# Patient Record
Sex: FEMALE
[De-identification: names, ages, dates, MRNs, and addresses within clinical notes are randomized per-mention and may not be internally consistent; named-entity substitution may affect disease eponyms.]

---

## 2018-02-24 ENCOUNTER — HOSPITAL ENCOUNTER (EMERGENCY)
Dept: HOSPITAL 46 - ED | Age: 73
Discharge: HOME | End: 2018-02-24
Payer: MEDICARE

## 2018-02-24 VITALS — WEIGHT: 170 LBS | BODY MASS INDEX: 31.28 KG/M2 | HEIGHT: 62 IN

## 2018-02-24 DIAGNOSIS — E03.9: ICD-10-CM

## 2018-02-24 DIAGNOSIS — M16.11: Primary | ICD-10-CM

## 2018-02-24 DIAGNOSIS — F32.9: ICD-10-CM

## 2018-02-24 DIAGNOSIS — Z79.899: ICD-10-CM

## 2018-02-24 DIAGNOSIS — M51.36: ICD-10-CM

## 2018-02-24 DIAGNOSIS — Z79.82: ICD-10-CM

## 2018-02-24 DIAGNOSIS — Z88.5: ICD-10-CM

## 2018-02-24 DIAGNOSIS — E78.00: ICD-10-CM

## 2018-02-24 DIAGNOSIS — M19.011: ICD-10-CM

## 2020-07-23 NOTE — XMS
Encounter Summary
  Created on: 2020
 
 Parrish Weeksgabe Montero
 External Reference #: [id number]
 : [birth year]
 Sex: Female
 
 Demographics
 
 
+-----------------------+----------------------+
| Address               | [address]         |
|                       | [place]  [address] |
+-----------------------+----------------------+
| Home Phone            | [phone]      |
+-----------------------+----------------------+
| Preferred Language    | Unknown              |
+-----------------------+----------------------+
| Marital Status        |               |
+-----------------------+----------------------+
| Advent Affiliation | Unknown              |
+-----------------------+----------------------+
| Race                  | Unknown              |
+-----------------------+----------------------+
| Ethnic Group          | Unknown              |
+-----------------------+----------------------+
 
 
 Author
 
 
+--------------+--------------------------------------------+
| Author       | Located within [facility]  |
|              | and [facility]                                |
+--------------+--------------------------------------------+
| Organization | Located within [facility]  |
|              | and [facility]                                |
+--------------+--------------------------------------------+
| Address      | Unknown                                    |
+--------------+--------------------------------------------+
| Phone        | Unavailable                                |
+--------------+--------------------------------------------+
 
 
 
 Support
 
 
+-------------------+--------------+---------+-----------------+
| Name              | Relationship | Address | Phone           |
+-------------------+--------------+---------+-----------------+
| Melissa Lau | ECON         | Unknown | [phone] |
+-------------------+--------------+---------+-----------------+
| Mike Lau   | ECON         | Unknown | [phone] |
+-------------------+--------------+---------+-----------------+
| Damaris Coyle     | ECON         | Unknown | [phone] |
+-------------------+--------------+---------+-----------------+
 
 
 
 
 Care Team Providers
 
 
+-----------------------+------+-----------------+
| Care Team Member Name | Role | Phone           |
+-----------------------+------+-----------------+
| Chandra King MD | PCP  | [phone] |
+-----------------------+------+-----------------+
 
 
 
 Reason for Referral
 Diagnostic/Screening (Routine)
 
+--------+--------+-----------+--------------+--------------+---------------+
| Status | Reason | Specialty | Diagnoses /  | Referred By  | Referred To   |
|        |        |           | Procedures   | Contact      | Contact       |
+--------+--------+-----------+--------------+--------------+---------------+
| Closed |        | Radiology |   Diagnoses  |   S[place],  |   [facility]    |
|        |        |           |  Dilated     | Anastasia CASTILLO MD   | [address]  |
|        |        |           | aortic root  | [address] |  [place], |
|        |        |           | (HCC)  Heart |  [place]  [place]   |  [place]           |
|        |        |           |  [place]      | [place]    | [address]    |
|        |        |           | Procedures   | [address]        | Phone:        |
|        |        |           | ECHO         | Phone:       | [phone]  |
|        |        |           | Complete  [place] | [phone] |  Fax:         |
|        |        |           |  ECHO HEART  |   Fax:       | [phone]  |
|        |        |           | XTHORACIC,CO | [phone] |               |
|        |        |           | MPLETE W     |              |               |
|        |        |           | DOPPLER  [place]  |              |               |
|        |        |           | ECHO HEART   |              |               |
|        |        |           | XTHORACIC,CO |              |               |
|        |        |           | MPLETE, W/O  |              |               |
|        |        |           | DOPPLER      |              |               |
+--------+--------+-----------+--------------+--------------+---------------+
 
 
 
 
 Reason for Visit
 Diagnostic/Screening (Routine)
 
+--------+--------+-----------+--------------+--------------+---------------+
| Status | Reason | Specialty | Diagnoses /  | Referred By  | Referred To   |
|        |        |           | Procedures   | Contact      | Contact       |
+--------+--------+-----------+--------------+--------------+---------------+
| Closed |        | Radiology |   Diagnoses  |   Suki,  |   [facility]    |
|        |        |           |  Dilated     | Anastasia CASTILLO MD   | [address]  |
|        |        |           | aortic root  | [address] |  [place], |
|        |        |           | (HCC)  Heart |  [place]  [place]   |  [place]           |
|        |        |           |  murmur      | [place]    | [address]    |
|        |        |           | Procedures   | [address]        | Phone:        |
|        |        |           | ECHO         | Phone:       | [phone]  |
|        |        |           | Complete  [place] | [phone] |  Fax:         |
|        |        |           |  ECHO HEART  |   Fax:       | [phone]  |
|        |        |           | XTHORACIC,CO | [phone] |               |
|        |        |           | MPLETE W     |              |               |
 
|        |        |           | DOPPLER  [place]  |              |               |
|        |        |           | ECHO HEART   |              |               |
|        |        |           | XTHORACIC,CO |              |               |
|        |        |           | MPLETE, W/O  |              |               |
|        |        |           | DOPPLER      |              |               |
+--------+--------+-----------+--------------+--------------+---------------+
 
 
 
 
 Encounter Details
 
 
+--------+-----------+----------------------+----------------------+----------------------+
| Date   | Type      | Department           | Care Team            | Description          |
+--------+-----------+----------------------+----------------------+----------------------+
| 08/10/ | Hospital  |   [facility] |   Anastasia Cohn,  | Dilated aortic root  |
| 2017   | Encounter |  [facility] ECHO  [address] | MD  [address]  | ([facility]); Heart murmur  |
|        |           |  [address]  [place]       |  [place]     |                      |
|        |           | [place] [address] | [address]  [phone]  |                      |
|        |           |   [phone]       |  [phone] (Fax)  |                      |
|        |           |                      |  Sendy Campos |                      |
|        |           |                      |  ANNA, Technologist     |                      |
|        |           |                      | [place], [place]      |                      |
|        |           |                      | [address]                |                      |
+--------+-----------+----------------------+----------------------+----------------------+
 
 
 
 Social History
 
 
+---------------+-------+-----------+--------+---------------------+
| Tobacco Use   | Types | Packs/Day | Years  | Date                |
|               |       |           | Used   |                     |
+---------------+-------+-----------+--------+---------------------+
| Former Smoker |       |           |        | Started: 1992 |
+---------------+-------+-----------+--------+---------------------+
 
 
 
+-------------+-------------+---------+----------+
| Alcohol Use | Drinks/Week | oz/Week | Comments |
+-------------+-------------+---------+----------+
| No          |             |         |          |
+-------------+-------------+---------+----------+
 
 
 
+------------------+---------------+
| Sex Assigned at  | Date Recorded |
| Birth            |               |
+------------------+---------------+
| Not on file      |               |
+------------------+---------------+
 documented as of this encounter
 
 Medications at Time of Discharge
 
 
 
+----------------------+----------------------+-----------+---------+----------+----------+
| Medication           | Sig                  | Dispensed | Refills | Start    | End Date |
|                      |                      |           |         | Date     |          |
+----------------------+----------------------+-----------+---------+----------+----------+
|   amLODIPine         | Take 5 mg by mouth   |           | 0       |          |          |
| (NORVASC) 5 mg       | Daily.               |           |         |          |          |
| tablet               |                      |           |         |          |          |
+----------------------+----------------------+-----------+---------+----------+----------+
|   aspirin 81 mg EC   | Take 81 mg by mouth  |           | 0       |          |          |
| tablet               | Daily.               |           |         |          |          |
+----------------------+----------------------+-----------+---------+----------+----------+
|   cetirizine         | Take 1 tablet by     |   30      | 2       | 20 |          |
| (ZYRTEC) 10 mg       | mouth Daily. As      | tablet    |         | 17       |          |
| tablet               | needed for rash      |           |         |          |          |
+----------------------+----------------------+-----------+---------+----------+----------+
|   cholecalciferol    | Take 1,000 Units by  |           | 0       |          |          |
| (VITAMIN D-3) 1000   | mouth Daily.         |           |         |          |          |
| UNITS TABS           |                      |           |         |          |          |
+----------------------+----------------------+-----------+---------+----------+----------+
|   FLUoxetine         | Take 20 mg by mouth  |           | 0       |          |          |
| (PROZAC) 20 mg       | Daily.               |           |         |          |          |
| capsule              |                      |           |         |          |          |
+----------------------+----------------------+-----------+---------+----------+----------+
|   levothyroxine      | Take 100 mcg by      |           | 0       |          |          |
| (SYNTHROID,          | mouth every morning  |           |         |          |          |
| LEVOTHROID) 100 mcg  | (before breakfast).  |           |         |          |          |
| tablet               |                      |           |         |          |          |
+----------------------+----------------------+-----------+---------+----------+----------+
|   Multiple           | Take 1 tablet by     |           | 0       |          |          |
| Vitamins-Minerals    | mouth Daily.         |           |         |          |          |
| (ADULT MULTIVITAMIN  |                      |           |         |          |          |
| WITH MINERALS/IRON)  |                      |           |         |          |          |
| TABS                 |                      |           |         |          |          |
+----------------------+----------------------+-----------+---------+----------+----------+
|   nitroglycerin      | Place 1 tablet under |   25      | 1       | 20 |          |
| (NITROSTAT) 0.4 mg   |  the tongue every 5  | tablet    |         | 17       |          |
| SL tablet            | minutes as needed    |           |         |          |          |
|                      | for Chest pain.      |           |         |          |          |
+----------------------+----------------------+-----------+---------+----------+----------+
|   simvastatin        | Take 40 mg by mouth  |           | 0       |          |          |
| (ZOCOR) 40 mg tablet | nightly.             |           |         |          |          |
+----------------------+----------------------+-----------+---------+----------+----------+
|   tolterodine        | Take 4 mg by mouth   |           | 0       |          |          |
| (DETROL LA) 4 MG 24  | Daily.               |           |         |          |          |
| hr capsule           |                      |           |         |          |          |
+----------------------+----------------------+-----------+---------+----------+----------+
|   traMADol (ULTRAM)  | Take 50 mg by mouth  |           | 0       |          |          |
| 50 mg tablet         | every 6 hours as     |           |         |          |          |
|                      | needed for Pain.     |           |         |          |          |
+----------------------+----------------------+-----------+---------+----------+----------+
|   traZODone          | Take 50 mg by mouth  |           | 0       |          |          |
| (DESYREL) 50 mg      | nightly.             |           |         |          |          |
| tablet               |                      |           |         |          |          |
+----------------------+----------------------+-----------+---------+----------+----------+
|   triamcinolone      | Small amount to rash |   45 g    | 1       | 20 |          |
| (KENALOG) 0.1% cream |  twice a day         |           |         | 17       |          |
+----------------------+----------------------+-----------+---------+----------+----------+
 documented as of this encounter
 
 Plan of Treatment
 
 Not on filedocumented as of this encounter
 
 Procedures
 
 
+----------------+--------+-------------+----------------------+----------------------+
| Procedure Name | Priori | Date/Time   | Associated Diagnosis | Comments             |
|                | ty     |             |                      |                      |
+----------------+--------+-------------+----------------------+----------------------+
| ECHO COMPLETE  | Routin | 08/10/2017  |   Dilated aortic     |   Results for this   |
|                | e      | 10:42 AM    | root (HCC)  Heart    | procedure are in the |
|                |        | PDT         | murmur               |  results section.    |
+----------------+--------+-------------+----------------------+----------------------+
 documented in this encounter
 
 Results
 ECHO Complete (08/10/2017 10:42 AM PDT)
 
+-------------+-------+-----------+-------------+--------------+
| Component   | Value | Ref Range | Performed   | Pathologist  |
|             |       |           | At          | Signature    |
+-------------+-------+-----------+-------------+--------------+
| LVEF-TTE    | [facility]    |           | [facility]  |              |
| [facility] |       |           | [facility]    |              |
| [facility]     |       |           | [facility]     |              |
|             |       |           | [facility] -    |              |
|             |       |           | IMAGING     |              |
+-------------+-------+-----------+-------------+--------------+
 
 
 
+----------+
| Specimen |
+----------+
|          |
+----------+
 
 
 
+----------------------------------------------------------------------------------------+--
---------------+
| Narrative                                                                              | P
erformed At    |
+----------------------------------------------------------------------------------------+--
---------------+
|   This result has an attachment that is not available.  Transthoracic                  |  
 [facility]    |
| Echocardiography Report (TTE)  Demographics  Patient Name    YOU [facility]CD[facility]S        |
| [facility]      Room Number                        GISSEL  Patient Number                     | [facility]  |
| [id number]       Date of Study               08/10/2017  Visit Number                 | -
 IMAGING       |
|     [id number]  Accession         [id number]         Referring                        |  
               |
| Physician      ANASTASIA COHN MD Number  Date of Birth   1945                    |  
               |
|       Sonographer                  [place],                                  |  
               |
|                                                                   [place]                   |  
 
               |
| Age                  71 year(s)         Interpreting                                   |  
               |
| ILANA LOPEZ MD                                                                          |  
               |
|   Cardiologist  Gender             Female               Nurse                          |  
               |
|                                          Stress Technician Procedure                   |  
               |
| Type of Study  TTE procedure: ECHO Complete. Procedure dateDate:                       |  
               |
| 08/10/2017Start: 10:10 AM Technical Quality: Adequate                                  |  
               |
| visualizationStudy Location: Echo Lab Patient Status: RoutineHeight:                   |  
               |
| 63 inchesWeight: 171 poundsBSA: 1.81 m^2BMI: 30.29 kg/m^2Rhythm:                       |  
               |
| Normal Sinus Rhythm ConclusionsSummaryNormal left ventricular cavity                   |  
               |
| with overall normal systolic function.Mild left atrial                                 |  
               |
| enlargement.LVEF is estimated at 60 %.Mild aortic valve                                |  
               |
| regurgitation.Since prior echo of 2017, no significant                             |  
               |
| changesRecommendationNo further cardiac work-up or                                     |  
               |
| therapy.Signature-----------------------------------------------------                 |  
               |
| ----------------------- Electronically signed by ILANA LOPEZ                            |  
               |
| MD(Interpreting physician) on 08/10/2017 05:41                                         |  
               |
| PM--------------------------------------------------------------------                 |  
               |
| -------- FindingsMitral ValveMild thickening of the mitral valve                       |  
               |
| leaflets without significantregurgitation or stenosis.Aortic                           |  
               |
| ValveAortic valve appears trileaflet.Mild aortic regurgitation is                      |  
               |
| noted.Diffuse thickening (sclerosis) of the aortic valve cusps without                 |  
               |
|  reducedexcursion.Tricuspid ValveStructurally normal tricuspid valve                   |  
               |
| without significant stenosis orregurgitation.Pulmonic                                  |  
               |
| ValveStructurally normal pulmonic valve without significant stenosis                   |  
               |
| orregurgitation.Left AtriumThe left atrium is mildly dilated.Left                      |  
               |
| VentricleLeft Ventricular size appears to be normal with an ejection                   |  
               |
| fractionestimated at 60 %.Right AtriumNormal right atrial size.Right                   |  
               |
| VentricleNormal right ventricular size and function.Pericardial                        |  
               |
| EffusionNo evidence of any pericardial effusion.Pleural EffusionNo                     |  
               |
| evident pleural effusion identified.MiscellaneousAortic root dimension                 |  
 
               |
|  within normal limits.No significant change in ascending aortic                        |  
               |
| diameter since prior echo of  Valves  Mitral Valve  Peak                        |  
               |
| E-Wave: 0.83 m/s Peak A-Wave: 0.58 m/s  Tissue Doppler  Septal e'                      |  
               |
| Velocity: 0.09 m/s  Aortic Valve        Area (continuity): 1.21 cm^2                   |  
               |
|       Mean Gradient: 3.32 mmHg  LVOT  Peak Velocity: 1.07 m/s LVOT                     |  
               |
| Diameter: 1.35 cm Structures  Left Atrium  LA A/P Dimension: 4.24 cm                   |  
               |
|                                 LA Area: 19.89 cm^2 LA Vol/BSA Index:                  |  
               |
| 26 mL/m^2                              LA Volume: 47.06 ml                             |  
               |
|                                                             EF                         |  
               |
| Sseklazug94%  Left Ventricle  Diastolic Dimension: 5.37 cm                             |  
               |
|  Systolic Dimension: 3.67 cm Septum Diastolic: 0.9 cm PW Diastolic:                    |  
               |
| 0.9 cm EF Calculated: 55%  Miscellaneous  Aorta  Aortic Root: 3.41 cm                  |  
               |
| Ascending Aorta: 3.58 cm                                                               |  
               |
| 08/10/2017 05:41 PM                                                                    |  
               |
|----------------------------------------------------------------------------             | 
                |
|                                                                                        |  
               |
|Findings                                                                               |   
              |
|Mitral Valve                                                                            |  
               |
|Mild thickening of the mitral valve leaflets without significant                        |  
               |
|regurgitation or stenosis.                                                              |  
               |
|Aortic Valve                                                                            |  
               |
|Aortic valve appears trileaflet.                                                        |  
               |
|Mild aortic regurgitation is noted.                                                     |  
               |
|Diffuse thickening (sclerosis) of the aortic valve cusps without reduced                |  
               |
|excursion.                                                                             |   
              |
|Tricuspid Valve                                                                         |  
               |
|Structurally normal tricuspid valve without significant stenosis or                     |  
               |
|regurgitation.                                                                         |   
              |
|Pulmonic Valve                                                                          |  
               |
|Structurally normal pulmonic valve without significant stenosis or                      |  
 
               |
|regurgitation.                                                                         |   
              |
|Left Atrium                                                                             |  
               |
|The left atrium is mildly dilated.                                                      |  
               |
|Left Ventricle                                                                          |  
               |
|Left Ventricular size appears to be normal with an ejection fraction                    |  
               |
|estimated at 60 %.                                                                      |  
               |
|Right Atrium                                                                            |  
               |
|Normal right atrial size.                                                               |  
               |
|Right Ventricle                                                                         |  
               |
|Normal right ventricular size and function.                                             |  
               |
|Pericardial Effusion                                                                    |  
               |
|No evidence of any pericardial effusion.                                                |  
               |
|Pleural Effusion                                                                        |  
               |
|No evident pleural effusion identified.                                                 |  
               |
|Miscellaneous                                                                          |   
              |
|Aortic root dimension within normal limits.                                             |  
               |
|No significant change in ascending aortic diameter since prior echo of               |  
               |
|                                                                                    |  
               |
|                                                                                        |  
               |
|Valves                                                                                 |   
              |
|                                                                                        |  
               |
| Mitral Valve                                                                           |  
               |
|                                                                                        |  
               |
| Peak E-Wave: 0.83 m/s                                                                  |  
               |
| Peak A-Wave: 0.58 m/s                                                                  |  
               |
|                                                                                        |  
               |
| Tissue Doppler                                                                         |  
               |
|                                                                                        |  
               |
| Septal e' Velocity: 0.09 m/s                                                           |  
               |
|                                                                                        |  
 
               |
| Aortic Valve                                                                           |  
               |
|                                                                                        |  
               |
|       Area (continuity): 1.21 cm^2                                                     |  
               |
|       Mean Gradient: 3.32 mmHg                                                         |  
               |
|                                                                                        |  
               |
| LVOT                                                                                   |  
               |
|                                                                                        |  
               |
| Peak Velocity: 1.07 m/s                                                                |  
               |
| LVOT Diameter: 1.35 cm                                                                 |  
               |
|                                                                                        |  
               |
|Structures                                                                             |   
              |
|                                                                                        |  
               |
| Left Atrium                                                                            |  
               |
|                                                                                        |  
               |
| LA A/P Dimension: 4.24 cm                                 LA Area: 19.89 cm^2          |  
               |
| LA Vol/BSA Index: 26 mL/m^2                              LA Volume: 47.06 ml           |  
               |
|                                                                        EF [id number]% |  
               |
|                                                                                        |  
               |
| Left Ventricle                                                                         |  
               |
|                                                                                        |  
               |
| Diastolic Dimension: 5.37 cm             Systolic Dimension: 3.67 cm                   |  
               |
| Septum Diastolic: 0.9 cm                                                               |  
               |
| PW Diastolic: 0.9 cm                                                                   |  
               |
| EF Calculated: 55%                                                                     |  
               |
|                                                                                        |  
               |
| Miscellaneous                                                                          |  
               |
|                                                                                        |  
               |
| Aorta                                                                                  |  
               |
|                                                                                        |  
               |
| Aortic Root: 3.41 cm                                                                   |  
 
               |
| Ascending Aorta: 3.58 cm                                                               |  
               |
|                                                                                        |  
               |
+----------------------------------------------------------------------------------------+--
---------------+
 
 
 
+------------------------------------------------------------------------------------------+
| Procedure Note                                                                           |
+------------------------------------------------------------------------------------------+
|   Ace, Rad Results In - 2017  5:08 PM PDT  Transthoracic Echocardiography Report   |
| (TTE) Demographics Patient Name   YOU LOERA    Room Number                GISSEL Patient |
|  Number [id number]     Date of Study          08/10/2017 Visit Number   [id number]     |
| Accession      [id number]      Referring Physician    ANASTASIA COHN MD Number Date of   |
| Birth  1945      Sonographer            CAROL ANGELO,                         |
|                                US Age            71 year(s)      Interpreting            |
| ILANA LPOEZ MD                                Cardiologist Gender         Female          |
|  Nurse                                Stress TechnicianProcedureType of Study TTE        |
| procedure: ECHO Complete.Procedure dateDate: 08/10/2017Start: 10:10 AMTechnical Quality: |
|  Adequate visualizationStudy Location: Echo LabPatient Status: RoutineHeight: 63         |
| inchesWeight: 171 poundsBSA: 1.81 m^2BMI: 30.29 kg/m^2Rhythm: Normal Sinus               |
| RhythmConclusionsSummaryNormal left ventricular cavity with overall normal systolic      |
| function.Mild left atrial enlargement.LVEF is estimated at 60 %.Mild aortic valve        |
| regurgitation.Since prior echo of 2017, no significant changesRecommendationNo       |
| further cardiac work-up or                                                               |
| therapy.Signature----------------------------------------------------------------------- |
| ----- Electronically signed by ILANA LOPEZ MD(Interpreting physician) on 08/10/2017 05:41 |
|                                                                                          |
| PM----------------------------------------------------------------------------FindingsMi |
| tral ValveMild thickening of the mitral valve leaflets without significantregurgitation  |
| or stenosis.Aortic ValveAortic valve appears trileaflet.Mild aortic regurgitation is     |
| noted.Diffuse thickening (sclerosis) of the aortic valve cusps without                   |
| reducedexcursion.Tricuspid ValveStructurally normal tricuspid valve without significant  |
| stenosis orregurgitation.Pulmonic ValveStructurally normal pulmonic valve without        |
| significant stenosis orregurgitation.Left AtriumThe left atrium is mildly dilated.Left   |
| VentricleLeft Ventricular size appears to be normal with an ejection fractionestimated   |
| at 60 %.Right AtriumNormal right atrial size.Right VentricleNormal right ventricular     |
| size and function.Pericardial EffusionNo evidence of any pericardial effusion.Pleural    |
| EffusionNo evident pleural effusion identified.MiscellaneousAortic root dimension within |
|  normal limits.No significant change in ascending aortic diameter since prior echo of    |
| [id number] Mitral Valve Peak E-Wave: 0.83 m/s Peak A-Wave: 0.58 m/s Tissue Doppler    |
| Septal e' Velocity: 0.09 m/s Aortic Valve     Area (continuity): 1.21 cm^2     Mean      |
| Gradient: 3.32 mmHg LVOT Peak Velocity: 1.07 m/s LVOT Diameter: 1.35 cmStructures Left   |
| Atrium LA A/P Dimension: 4.24 cm                      LA Area: 19.89 cm^2 LA Vol/BSA     |
| Index: 26 mL/m^2                    LA Volume: 47.06 ml                                  |
|                EF Latrntpih43% Left Ventricle Diastolic Dimension: 5.37 cm               |
| Systolic Dimension: 3.67 cm Septum Diastolic: 0.9 cm PW Diastolic: 0.9 cm EF Calculated: |
|  55% Miscellaneous Aorta Aortic Root: 3.41 cm Ascending Aorta: 3.58 cm                   |
|                                                                                          |
|Conclusions                                                                              |
|Summary                                                                                  |
|Normal left ventricular cavity with overall normal systolic function.                     |
|Mild left atrial enlargement.                                                             |
|LVEF is estimated at 60 %.                                                                |
|Mild aortic valve regurgitation.                                                          |
|Since prior echo of 2017, no significant changes                                      |
|Recommendation                                                                           |
 
|No further cardiac work-up or therapy.                                                    |
|Signature                                                                                |
|----------------------------------------------------------------------------               
|
| Electronically signed by ILANA LOPEZ MD(Interpreting physician) on                        |
| 08/10/2017 05:41 PM                                                                      |
|----------------------------------------------------------------------------               
|
|                                                                                          |
|Findings                                                                                 |
|Mitral Valve                                                                              |
|Mild thickening of the mitral valve leaflets without significant                          |
|regurgitation or stenosis.                                                                |
|Aortic Valve                                                                              |
|Aortic valve appears trileaflet.                                                          |
|Mild aortic regurgitation is noted.                                                       |
|Diffuse thickening (sclerosis) of the aortic valve cusps without reduced                  |
|excursion.                                                                               |
|Tricuspid Valve                                                                           |
|Structurally normal tricuspid valve without significant stenosis or                       |
|regurgitation.                                                                           |
|Pulmonic Valve                                                                            |
|Structurally normal pulmonic valve without significant stenosis or                        |
|regurgitation.                                                                           |
|Left Atrium                                                                               |
|The left atrium is mildly dilated.                                                        |
|Left Ventricle                                                                            |
|Left Ventricular size appears to be normal with an ejection fraction                      |
|estimated at 60 %.                                                                        |
|Right Atrium                                                                              |
|Normal right atrial size.                                                                 |
|Right Ventricle                                                                           |
|Normal right ventricular size and function.                                               |
|Pericardial Effusion                                                                      |
|No evidence of any pericardial effusion.                                                  |
|Pleural Effusion                                                                          |
|No evident pleural effusion identified.                                                   |
|Miscellaneous                                                                            |
|Aortic root dimension within normal limits.                                               |
|No significant change in ascending aortic diameter since prior echo of                                                                                       |
|                                                                                          |
|Valves                                                                                   |
|                                                                                          |
| Mitral Valve                                                                             |
|                                                                                          |
| Peak E-Wave: 0.83 m/s                                                                    |
| Peak A-Wave: 0.58 m/s                                                                    |
|                                                                                          |
| Tissue Doppler                                                                           |
|                                                                                          |
| Septal e' Velocity: 0.09 m/s                                                             |
|                                                                                          |
| Aortic Valve                                                                             |
|                                                                                          |
|     Area (continuity): 1.21 cm^2                                                         |
|     Mean Gradient: 3.32 mmHg                                                             |
|                                                                                          |
| LVOT                                                                                     |
|                                                                                          |
 
| Peak Velocity: 1.07 m/s                                                                  |
| LVOT Diameter: 1.35 cm                                                                   |
|                                                                                          |
|Structures                                                                               |
|                                                                                          |
| Left Atrium                                                                              |
|                                                                                          |
| LA A/P Dimension: 4.24 cm                      LA Area: 19.89 cm^2                       |
| LA Vol/BSA Index: 26 mL/m^2                    LA Volume: 47.06 ml                       |
|                                                EF Zllzziptj68%                           |
|                                                                                          |
| Left Ventricle                                                                           |
|                                                                                          |
| Diastolic Dimension: 5.37 cm         Systolic Dimension: 3.67 cm                         |
| Septum Diastolic: 0.9 cm                                                                 |
| PW Diastolic: 0.9 cm                                                                     |
| EF Calculated: 55%                                                                       |
|                                                                                          |
| Miscellaneous                                                                            |
|                                                                                          |
| Aorta                                                                                    |
|                                                                                          |
| Aortic Root: 3.41 cm                                                                     |
| Ascending Aorta: 3.58 cm                                                                 |
+------------------------------------------------------------------------------------------+
 
 
 
+----------------------+---------------------+--------------------+----------------+
| Performing           | Address             | City/State/Zipcode | Phone Number   |
| Organization         |                     |                    |                |
+----------------------+---------------------+--------------------+----------------+
|   [facility]     |   [address] |   [place]  |   [phone] |
| [facility]  |                     | [id number]              |                |
| - IMAGING            |                     |                    |                |
+----------------------+---------------------+--------------------+----------------+
 documented in this encounter
 
 Visit Diagnoses
 
 
+---------------------------------------------------------------+
| Diagnosis                                                     |
+---------------------------------------------------------------+
|   Dilated aortic root (HCC)  Aortic ectasia, unspecified site |
+---------------------------------------------------------------+
|   Heart murmur  Undiagnosed cardiac murmurs                   |
+---------------------------------------------------------------+
 documented in this encounter

## 2020-07-23 NOTE — NUR
2305 PATIENT ARRIVED TO THE UNIT VIA STRETCHER. PATIENT TRANSFERED
INDEPENDENTLY TO THE BATHROOM TO VOID AND THEN INTO BED. PATIENT DENIES
FEELING SOB AND APPEARS STEADY ON HER FEET. REPORTS PRESSURE TYPE PAIN IN HER
CHEST, 5/10. LUNGS ARE CLEAR. VS STABLE. TOLERATING ROOM AIR.
 
2335
PATIENT PROVIDED WITH SCHEDULED MEDS. DISCUSSED PLAN OF CARE. ALL QUESTIONS
ANSWERED. SPOKE TO  ABOUT PATIENT'S PAIN. PRN ORDERS FOR PAIN MEDS
RECEIVED. VERIFIED VIA REPEAT BACK.

## 2020-07-23 NOTE — XMS
PreManage Notification: EVARISTO ORTA MRN:[id number]
 
Security Information
 
Security Events
No recent Security Events currently on file
 
 
 
[facility]IA MET
------------
[facility]
 
 
CARE PROVIDERS
There are no care providers on record at this time.
 
Juan C has no Care Guidelines for this patient.
 
BREANA VISIT COUNT (12 MO.)
-------------------------------------------------------------------------------------
1 ABDIFATAH Yo
-------------------------------------------------------------------------------------
TOTAL 1
-------------------------------------------------------------------------------------
NOTE: Visits indicate total known visits.
 
ED/C VISIT TRACKING (12 MO.)
-------------------------------------------------------------------------------------
07/23/2020 20:26
[facility] [place]
 
TYPE: Emergency
 
COMPLAINT:
- CHEST PAIN
-------------------------------------------------------------------------------------
 
 
INPATIENT VISIT TRACKING (12 MO.)
No inpatient visits to display in this time frame
 
[email or web address]

## 2020-07-23 NOTE — XMS
Clinical Summary
  Created on: 2020
 
 Sera Weeks Kylah
 External Reference #: [id number]
 : [birth year]
 Sex: Female
 
 Demographics
 
 
+-----------------------+----------------------+
| Address               | [address]         |
|                       | [place]  [address] |
+-----------------------+----------------------+
| Home Phone            | [phone]      |
+-----------------------+----------------------+
| Preferred Language    | Unknown              |
+-----------------------+----------------------+
| Marital Status        |               |
+-----------------------+----------------------+
| Pentecostalism Affiliation | Unknown              |
+-----------------------+----------------------+
| Race                  | Unknown              |
+-----------------------+----------------------+
| Ethnic Group          | Unknown              |
+-----------------------+----------------------+
 
 
 Author
 
 
+--------------+--------------------------------------------+
| Author       | [facility]  |
|              | and [facility]                                |
+--------------+--------------------------------------------+
| Organization | [facility]  |
|              | and [facility]                                |
+--------------+--------------------------------------------+
| Address      | Unknown                                    |
+--------------+--------------------------------------------+
| Phone        | Unavailable                                |
+--------------+--------------------------------------------+
 
 
 
 Support
 
 
+-------------------+--------------+---------+-----------------+
| Name              | Relationship | Address | Phone           |
+-------------------+--------------+---------+-----------------+
| Melissa Lau | ECON         | Unknown | [phone] |
+-------------------+--------------+---------+-----------------+
| Mike Lau   | ECON         | Unknown | [phone] |
+-------------------+--------------+---------+-----------------+
| Damaris Jonna     | ECON         | Unknown | [phone] |
+-------------------+--------------+---------+-----------------+
 
 
 
 
 Care Team Providers
 
 
+-----------------------+------+-----------------+
| Care Team Member Name | Role | Phone           |
+-----------------------+------+-----------------+
| Chandra King MD | PCP  | [phone] |
+-----------------------+------+-----------------+
 
 
 
 Allergies
 
 
+----------------+-----------+----------+----------+----------+
| Active Allergy | Reactions | Severity | Noted    | Comments |
|                |           |          | Date     |          |
+----------------+-----------+----------+----------+----------+
| Codeine        | Itching   | Medium   | 20 |          |
|                |           |          | 17       |          |
+----------------+-----------+----------+----------+----------+
 
 
 
 Medications
 
 
+----------------------+----------------------+-----------+---------+------+------+-------+
| Medication           | Sig                  | Dispensed | Refills | Star | End  | Statu |
|                      |                      |           |         | t    | Date | s     |
|                      |                      |           |         | Date |      |       |
+----------------------+----------------------+-----------+---------+------+------+-------+
|   aspirin 81 mg EC   | Take 81 mg by mouth  |           | 0       |      |      | Activ |
| tablet               | Daily.               |           |         |      |      | e     |
+----------------------+----------------------+-----------+---------+------+------+-------+
|   Multiple           | Take 1 tablet by     |           | 0       |      |      | Activ |
| Vitamins-Minerals    | mouth Daily.         |           |         |      |      | e     |
| (ADULT MULTIVITAMIN  |                      |           |         |      |      |       |
| WITH MINERALS/IRON)  |                      |           |         |      |      |       |
| TABS                 |                      |           |         |      |      |       |
+----------------------+----------------------+-----------+---------+------+------+-------+
|   levothyroxine      | Take 100 mcg by      |           | 0       |      |      | Activ |
| (SYNTHROID,          | mouth every morning  |           |         |      |      | e     |
| LEVOTHROID) 100 mcg  | (before breakfast).  |           |         |      |      |       |
| tablet               |                      |           |         |      |      |       |
+----------------------+----------------------+-----------+---------+------+------+-------+
|   simvastatin        | Take 40 mg by mouth  |           | 0       |      |      | Activ |
| (ZOCOR) 40 mg tablet | nightly.             |           |         |      |      | e     |
+----------------------+----------------------+-----------+---------+------+------+-------+
|   FLUoxetine         | Take 20 mg by mouth  |           | 0       |      |      | Activ |
| (PROZAC) 20 mg       | Daily.               |           |         |      |      | e     |
| capsule              |                      |           |         |      |      |       |
+----------------------+----------------------+-----------+---------+------+------+-------+
|   tolterodine        | Take 4 mg by mouth   |           | 0       |      |      | Activ |
| (DETROL LA) 4 MG 24  | Daily.               |           |         |      |      | e     |
| hr capsule           |                      |           |         |      |      |       |
+----------------------+----------------------+-----------+---------+------+------+-------+
 
|   cholecalciferol    | Take 1,000 Units by  |           | 0       |      |      | Activ |
| (VITAMIN D-3) 1000   | mouth Daily.         |           |         |      |      | e     |
| UNITS TABS           |                      |           |         |      |      |       |
+----------------------+----------------------+-----------+---------+------+------+-------+
|   nitroglycerin      | Place 1 tablet under |   25      | 1       | 01/0 |      | Activ |
| (NITROSTAT) 0.4 mg   |  the tongue every 5  | tablet    |         | 3/20 |      | e     |
| SL tablet            | minutes as needed    |           |         | 17   |      |       |
|                      | for Chest pain.      |           |         |      |      |       |
+----------------------+----------------------+-----------+---------+------+------+-------+
|   amLODIPine         | Take 5 mg by mouth   |           | 0       |      |      | Activ |
| (NORVASC) 5 mg       | Daily.               |           |         |      |      | e     |
| tablet               |                      |           |         |      |      |       |
+----------------------+----------------------+-----------+---------+------+------+-------+
|   traMADol (ULTRAM)  | Take 50 mg by mouth  |           | 0       |      |      | Activ |
| 50 mg tablet         | every 6 hours as     |           |         |      |      | e     |
|                      | needed for Pain.     |           |         |      |      |       |
+----------------------+----------------------+-----------+---------+------+------+-------+
|   traZODone          | Take 50 mg by mouth  |           | 0       |      |      | Activ |
| (DESYREL) 50 mg      | nightly.             |           |         |      |      | e     |
| tablet               |                      |           |         |      |      |       |
+----------------------+----------------------+-----------+---------+------+------+-------+
|   triamcinolone      | Small amount to rash |   45 g    | 1       | /2 |      | Activ |
| (KENALOG) 0.1% cream |  twice a day         |           |         |  |      | e     |
|                      |                      |           |         | 17   |      |       |
+----------------------+----------------------+-----------+---------+------+------+-------+
|   cetirizine         | Take 1 tablet by     |   30      | 2       |  |      | Activ |
| (ZYRTEC) 10 mg       | mouth Daily. As      | tablet    |         |  |      | e     |
| tablet               | needed for rash      |           |         | 17   |      |       |
+----------------------+----------------------+-----------+---------+------+------+-------+
 
 
 
 Active Problems
 
 
+---------------------------------+------------+
| Problem                         | Noted Date |
+---------------------------------+------------+
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
+---------------------------------+------------+
 
 
 
+-------------------------------------------------------------------+
|   Overview:   Cath on 17Enlarged No AI No obvious dissection  |
| Possible small aortic ulceration CONCLUSIONS:1. Dilated Aortic    |
| Root2. No AI3. Normal Left Main4. Normal LAD5. Dominant LCX.      |
|   10% luminal irregularity in AV groove.   Gives off the PDA6.    |
| Non dominant RCA.   Mild plaque.   Arises anomalously from the    |
| left cusp                                                         |
|1. Dilated Aortic Root                                             |
|2. No AI                                                           |
|3. Normal Left Main                                                |
|4. Normal LAD                                                      |
|5. Dominant LCX.   10% luminal irregularity in AV groove.   Gives  |
|off the PDA                                                        |
|6. Non dominant RCA.   Mild plaque.   Arises anomalously from the  |
|left cusp                                                          |
+-------------------------------------------------------------------+
 
 
 
 
+------------------------------------------------+------------+
| Pure hypercholesterolemia                      | 2017 |
+------------------------------------------------+------------+
| NSTEMI (non-ST elevated myocardial infarction) | 2017 |
+------------------------------------------------+------------+
| Acquired hypothyroidism                        | 2017 |
+------------------------------------------------+------------+
| Depression                                     | 2017 |
+------------------------------------------------+------------+
 
 
 
 Family History
 
 
+------------------+----------+------+----------+
| Medical History  | Relation | Name | Comments |
+------------------+----------+------+----------+
| Coronary artery  | Brother  |      |          |
| disease          |          |      |          |
+------------------+----------+------+----------+
| Coronary artery  | Father   |      |          |
| disease          |          |      |          |
+------------------+----------+------+----------+
| Coronary artery  | Mother   |      |          |
| disease          |          |      |          |
+------------------+----------+------+----------+
 
 
 
+----------+------+--------+----------+
| Relation | Name | Status | Comments |
+----------+------+--------+----------+
| Brother  |      |        |          |
+----------+------+--------+----------+
| Father   |      |        |          |
+----------+------+--------+----------+
| Mother   |      |        |          |
+----------+------+--------+----------+
 
 
 
 Social History
 
 
+---------------+-------+-----------+--------+---------------------+
| Tobacco Use   | Types | Packs/Day | Years  | Date                |
|               |       |           | Used   |                     |
+---------------+-------+-----------+--------+---------------------+
| Former Smoker |       |           |        | Started: 1992 |
+---------------+-------+-----------+--------+---------------------+
 
 
 
+-------------+-------------+---------+----------+
| Alcohol Use | Drinks/Week | oz/Week | Comments |
+-------------+-------------+---------+----------+
| No          |             |         |          |
 
+-------------+-------------+---------+----------+
 
 
 
+------------------+---------------+
| Sex Assigned at  | Date Recorded |
| Birth            |               |
+------------------+---------------+
| Not on file      |               |
+------------------+---------------+
 
 
 
 Last Filed Vital Signs
 
 
+-------------------+-------------------+----------------------+----------+
| Vital Sign        | Reading           | Time Taken           | Comments |
+-------------------+-------------------+----------------------+----------+
| Blood Pressure    | 122/72            | 08/10/2017 11:29 AM  |          |
|                   |                   | PDT                  |          |
+-------------------+-------------------+----------------------+----------+
| Pulse             | 68                | 08/10/2017 11:29 AM  |          |
|                   |                   | PDT                  |          |
+-------------------+-------------------+----------------------+----------+
| Temperature       | 37   C (98.6   F) | 2017 11:37 AM  |          |
|                   |                   | PST                  |          |
+-------------------+-------------------+----------------------+----------+
| Respiratory Rate  | 14                | 08/10/2017 11:29 AM  |          |
|                   |                   | PDT                  |          |
+-------------------+-------------------+----------------------+----------+
| Oxygen Saturation | 94%               | 2017 11:37 AM  |          |
|                   |                   | PST                  |          |
+-------------------+-------------------+----------------------+----------+
| Inhaled Oxygen    | -                 | -                    |          |
| Concentration     |                   |                      |          |
+-------------------+-------------------+----------------------+----------+
| Weight            | 77.6 kg (171 lb)  | 08/10/2017 11:29 AM  |          |
|                   |                   | PDT                  |          |
+-------------------+-------------------+----------------------+----------+
| Height            | 160 cm (5' 3")    | 08/10/2017 11:29 AM  |          |
|                   |                   | PDT                  |          |
+-------------------+-------------------+----------------------+----------+
| Body Mass Index   | 30.29             | 08/10/2017 11:29 AM  |          |
|                   |                   | PDT                  |          |
+-------------------+-------------------+----------------------+----------+
 
 
 
 Plan of Treatment
 
 
+----------------------+-----------+-------+----------+
| Health Maintenance   | Due Date  | Last  | Comments |
|                      |           | Done  |          |
+----------------------+-----------+-------+----------+
| Vaccine:             | 10/25/196 |       |          |
| Dtap/Tdap/Td (1 -    | 6         |       |          |
| Tdap)                |           |       |          |
+----------------------+-----------+-------+----------+
 
| Vaccine: Zoster (1   | 10/25/199 |       |          |
| of 2)                | 7         |       |          |
+----------------------+-----------+-------+----------+
| Breast Cancer        | 10/25/200 |       |          |
| Screening            | 2         |       |          |
+----------------------+-----------+-------+----------+
| Vaccine:             | 10/25/201 |       |          |
| Pneumococcal 65+ (1  | 2         |       |          |
| of 1 - PPSV23)       |           |       |          |
+----------------------+-----------+-------+----------+
| Vaccine: Influenza   |  |       |          |
| (#1)                 | 0         |       |          |
+----------------------+-----------+-------+----------+
 
 
 
 Results
 Not on filefrom Last 3 Months
 
 Insurance
 
 
+----------+--------+-------------+--------+-------------+---------+--------+
| Payer    | Benefi | Subscriber  | Effect | Phone       | Address | Type   |
|          | t Plan | ID          | sukh    |             |         |        |
|          |  /     |             | Dates  |             |         |        |
|          | Group  |             |        |             |         |        |
+----------+--------+-------------+--------+-------------+---------+--------+
| MEDICARE | MEDICA | [id number]  | 10/1/2 | [phone] |         | Medica |
|          | RE     |             | 012-Pr | 5           |         | re     |
|          | PART A |             | esent  |             |         |        |
|          |  AND B |             |        |             |         |        |
+----------+--------+-------------+--------+-------------+---------+--------+
|   | CHAMPV | [id number]   | / | [phone] |         | Indemn |
|          | A      |             | -P | 7           |         | ity    |
|          |        |             | resent |             |         |        |
+----------+--------+-------------+--------+-------------+---------+--------+
 
 
 
+------------------+--------+-------------+--------+-------------+---------------------+
| Guarantor Name   | Accoun | Relation to | Date   | Phone       | Billing Address     |
|                  | t Type |  Patient    | of     |             |                     |
|                  |        |             | Birth  |             |                     |
+------------------+--------+-------------+--------+-------------+---------------------+
| Sera Weeks | Person | Self        | [birth year]             |   [address]      |
|                  | al/Fam |             | 1945   | [phone] | [place] [address] |
|                  | magalys    |             |        | 2 (Home)    |                     |
+------------------+--------+-------------+--------+-------------+---------------------+
 
 
 
 Advance Directives
 
 
+-----------+-----------------+----------------+-------------+
| Type      | Date Recorded   | Patient        | Explanation |
|           |                 | Representative |             |
+-----------+-----------------+----------------+-------------+
| Power of  |                 |                |             |
 
|   |                 |                |             |
+-----------+-----------------+----------------+-------------+
| Advance   | 2017  6:11  |                |             |
| Directive | PM              |                |             |
+-----------+-----------------+----------------+-------------+
 
 
 
+-------------+------------+-------------+----------+
| Code Status | Date       | Date        | Comments |
|             | Activated  | Inactivated |          |
+-------------+------------+-------------+----------+
| Full Code   | 2017   | 1/3/2017    |          |
|             | 7:59 AM    | 4:12 PM     |          |
+-------------+------------+-------------+----------+

## 2020-07-23 NOTE — XMS
Encounter Summary
  Created on: 2020
 
 Parrish Weeksgabe Montero
 External Reference #: [id number]
 : [birth year]
 Sex: Female
 
 Demographics
 
 
+-----------------------+----------------------+
| Address               | [address]         |
|                       | [place]  [address] |
+-----------------------+----------------------+
| Home Phone            | [phone]      |
+-----------------------+----------------------+
| Preferred Language    | Unknown              |
+-----------------------+----------------------+
| Marital Status        |               |
+-----------------------+----------------------+
| Mandaeism Affiliation | Unknown              |
+-----------------------+----------------------+
| Race                  | Unknown              |
+-----------------------+----------------------+
| Ethnic Group          | Unknown              |
+-----------------------+----------------------+
 
 
 Author
 
 
+--------------+--------------------------------------------+
| Author       | [facility] [place]  |
|              | and [facility]                                |
+--------------+--------------------------------------------+
| Organization | [facility] [place]  |
|              | and [facility]                                |
+--------------+--------------------------------------------+
| Address      | Unknown                                    |
+--------------+--------------------------------------------+
| Phone        | Unavailable                                |
+--------------+--------------------------------------------+
 
 
 
 Support
 
 
+-------------------+--------------+---------+-----------------+
| Name              | Relationship | Address | Phone           |
+-------------------+--------------+---------+-----------------+
| Melissa Lau | ECON         | Unknown | [phone] |
+-------------------+--------------+---------+-----------------+
| Mike Lau   | ECON         | Unknown | [phone] |
+-------------------+--------------+---------+-----------------+
| Damaris Coyle     | ECON         | Unknown | [phone] |
+-------------------+--------------+---------+-----------------+
 
 
 
 
 Care Team Providers
 
 
+-----------------------+------+-----------------+
| Care Team Member Name | Role | Phone           |
+-----------------------+------+-----------------+
| Chandra King MD | PCP  | [phone] |
+-----------------------+------+-----------------+
 
 
 
 Encounter Details
 
 
+--------+----------+----------------------+----------------------+-------------+
| Date   | Type     | Department           | Care Team            | Description |
+--------+----------+----------------------+----------------------+-------------+
| / | Abstract |   [facility]          |   Anastasia Cohn,  |             |
|    |          | [address]  [address]    | MD  [address]  |             |
|        |          | [place]  [place], |  [place], [place]     |             |
|        |          |  [place] [address]       | [address]  [phone]  |             |
|        |          | [phone]         |  [phone] (Fax)  |             |
+--------+----------+----------------------+----------------------+-------------+
 
 
 
 Social History
 
 
+---------------+-------+-----------+--------+---------------------+
| Tobacco Use   | Types | Packs/Day | Years  | Date                |
|               |       |           | Used   |                     |
+---------------+-------+-----------+--------+---------------------+
| Former Smoker |       |           |        | Started: 1992 |
+---------------+-------+-----------+--------+---------------------+
 
 
 
+-------------+-------------+---------+----------+
| Alcohol Use | Drinks/Week | oz/Week | Comments |
+-------------+-------------+---------+----------+
| No          |             |         |          |
+-------------+-------------+---------+----------+
 
 
 
+------------------+---------------+
| Sex Assigned at  | Date Recorded |
| Birth            |               |
+------------------+---------------+
| Not on file      |               |
+------------------+---------------+
 documented as of this encounter
 
 Plan of Treatment
 Not on filedocumented as of this encounter
 
 
 Visit Diagnoses
 Not on filedocumented in this encounter

## 2020-07-23 NOTE — XMS
Encounter Summary
  Created on: 2020
 
 Parrish Weeksgabe Montero
 External Reference #: [id number]
 : [birth year]
 Sex: Female
 
 Demographics
 
 
+-----------------------+----------------------+
| Address               | [address]         |
|                       | [place]  [address] |
+-----------------------+----------------------+
| Home Phone            | [phone]      |
+-----------------------+----------------------+
| Preferred Language    | Unknown              |
+-----------------------+----------------------+
| Marital Status        |               |
+-----------------------+----------------------+
| Scientologist Affiliation | Unknown              |
+-----------------------+----------------------+
| Race                  | Unknown              |
+-----------------------+----------------------+
| Ethnic Group          | Unknown              |
+-----------------------+----------------------+
 
 
 Author
 
 
+--------------+--------------------------------------------+
| Author       | [facility]  |
|              | and [facility]                                |
+--------------+--------------------------------------------+
| Organization | [facility]  |
|              | and [facility]                                |
+--------------+--------------------------------------------+
| Address      | Unknown                                    |
+--------------+--------------------------------------------+
| Phone        | Unavailable                                |
+--------------+--------------------------------------------+
 
 
 
 Support
 
 
+-------------------+--------------+---------+-----------------+
| Name              | Relationship | Address | Phone           |
+-------------------+--------------+---------+-----------------+
| Melissa Lau | ECON         | Unknown | [phone] |
+-------------------+--------------+---------+-----------------+
| Mike Lau   | ECON         | Unknown | [phone] |
+-------------------+--------------+---------+-----------------+
| Damaris Jonna     | ECON         | Unknown | [phone] |
+-------------------+--------------+---------+-----------------+
 
 
 
 
 Care Team Providers
 
 
+-----------------------+------+-----------------+
| Care Team Member Name | Role | Phone           |
+-----------------------+------+-----------------+
| Chandra King MD | PCP  | [phone] |
+-----------------------+------+-----------------+
 
 
 
 Reason for Visit
 
 
+-----------+----------+
| Reason    | Comments |
+-----------+----------+
| Follow-up |          |
+-----------+----------+
 
 
 
 Encounter Details
 
 
+--------+---------+----------------------+----------------------+-------------------+
| Date   | Type    | Department           | Care Team            | Description       |
+--------+---------+----------------------+----------------------+-------------------+
| 08/10/ | Office  |   [facility]          |   Toño Wilcox MD | Thoracic aortic   |
| 2017   | Visit   | CARDIOLOGY  [address]    |   [address]    | aneurysm without  |
|        |         | [place]  [place], | [place], [place]      | rupture (HCC)     |
|        |         |  [place] [address]       | [address]  [phone]  | (Primary Dx)      |
|        |         | [phone]         |  [phone] (Fax)  |                   |
+--------+---------+----------------------+----------------------+-------------------+
 
 
 
 Social History
 
 
+---------------+-------+-----------+--------+---------------------+
| Tobacco Use   | Types | Packs/Day | Years  | Date                |
|               |       |           | Used   |                     |
+---------------+-------+-----------+--------+---------------------+
| Former Smoker |       |           |        | Started: 1992 |
+---------------+-------+-----------+--------+---------------------+
 
 
 
+-------------+-------------+---------+----------+
| Alcohol Use | Drinks/Week | oz/Week | Comments |
+-------------+-------------+---------+----------+
| No          |             |         |          |
+-------------+-------------+---------+----------+
 
 
 
 
+------------------+---------------+
| Sex Assigned at  | Date Recorded |
| Birth            |               |
+------------------+---------------+
| Not on file      |               |
+------------------+---------------+
 documented as of this encounter
 
 Last Filed Vital Signs
 
 
+-------------------+------------------+----------------------+----------+
| Vital Sign        | Reading          | Time Taken           | Comments |
+-------------------+------------------+----------------------+----------+
| Blood Pressure    | 122/72           | 08/10/2017 11:29 AM  |          |
|                   |                  | PDT                  |          |
+-------------------+------------------+----------------------+----------+
| Pulse             | 68               | 08/10/2017 11:29 AM  |          |
|                   |                  | PDT                  |          |
+-------------------+------------------+----------------------+----------+
| Temperature       | -                | -                    |          |
+-------------------+------------------+----------------------+----------+
| Respiratory Rate  | 14               | 08/10/2017 11:29 AM  |          |
|                   |                  | PDT                  |          |
+-------------------+------------------+----------------------+----------+
| Oxygen Saturation | -                | -                    |          |
+-------------------+------------------+----------------------+----------+
| Inhaled Oxygen    | -                | -                    |          |
| Concentration     |                  |                      |          |
+-------------------+------------------+----------------------+----------+
| Weight            | 77.6 kg (171 lb) | 08/10/2017 11:29 AM  |          |
|                   |                  | PDT                  |          |
+-------------------+------------------+----------------------+----------+
| Height            | 160 cm (5' 3")   | 08/10/2017 11:29 AM  |          |
|                   |                  | PDT                  |          |
+-------------------+------------------+----------------------+----------+
| Body Mass Index   | 30.29            | 08/10/2017 11:29 AM  |          |
|                   |                  | PDT                  |          |
+-------------------+------------------+----------------------+----------+
 documented in this encounter
 
 Progress Notes
 Toño Wilcox MD - 08/10/2017 11:45 AM MickiLuis Carlos Zamora Micky presents for outpatient foll
ow-up and repeat echocardiography.  The patient was last seen in our clinic by Dr. Anastasia Russo ms in 2017 at which time she underwent echocardiography to evaluate a mildly enlarge
D or upper normal ascending aortic.  The patient had developed a skin rash after initiation 
of amlodipine in January however her rash resolved and she continues on amlodipine without i
ntolerance.  She has no complaints of chest pain nor dyspnea.  She reports her blood pressur
e control has been good.  She has no complaints of palpitation.
 
 Repeat transthoracic echocardiography today shows normal left ventricular chamber size and 
systolic function.  The maximal transverse diameter of the ascending aorta is 3.96 which is 
upper normal.  There is no evidence of dissection.  There is no interval change in her aorti
c diameter measurement since her prior echocardiogram in 2017.
 
 PHYSICAL EXAMINATION
 Neck: Supple, no bruit
 Chest: Clear to auscultation
 Cardiac: Regular rhythm, no S3, jugular venous pressure normal
 Extremities: Trace edema
 
 
 ASSESSMENT/PLAN
 
 1.  Ascending aortic dilatation: No interval change in thoracic aortic diameter from prior 
echocardiogram in January.
 2.  Coronary artery disease: Patient with nonobstructive coronary artery disease on previou
s angiogram
 
 RECOMMENDATIONS
 Continue home blood pressure monitoring
 Recommend whole food plant-based diet, avoid processed foods and dietary sodium
 No further cardiac testing at this time
 Follow-up as neededElectronically signed by Toño CHE MD at 08/15/2017  5:48 PM PDTd
ocumented in this encounter
 
 Plan of Treatment
 Not on filedocumented as of this encounter
 
 Visit Diagnoses
 
 
+----------------------------------------------------------------------------------------+
| Diagnosis                                                                              |
+----------------------------------------------------------------------------------------+
|   Thoracic aortic aneurysm without rupture (HCC) - Primary  Thoracic aneurysm without  |
| mention of rupture                                                                     |
+----------------------------------------------------------------------------------------+
 documented in this encounter

## 2020-07-23 NOTE — XMS
Encounter Summary
  Created on: 2020
 
 Parrish Weeksgabe Montero
 External Reference #: [id number]
 : [birth year]
 Sex: Female
 
 Demographics
 
 
+-----------------------+----------------------+
| Address               | [address]         |
|                       | [place]  [address] |
+-----------------------+----------------------+
| Home Phone            | [phone]      |
+-----------------------+----------------------+
| Preferred Language    | Unknown              |
+-----------------------+----------------------+
| Marital Status        |               |
+-----------------------+----------------------+
| Moravian Affiliation | Unknown              |
+-----------------------+----------------------+
| Race                  | Unknown              |
+-----------------------+----------------------+
| Ethnic Group          | Unknown              |
+-----------------------+----------------------+
 
 
 Author
 
 
+--------------+--------------------------------------------+
| Author       | [facility]  |
|              | and [place]                                |
+--------------+--------------------------------------------+
| Organization | [facility]  |
|              | and [facility]                                |
+--------------+--------------------------------------------+
| Address      | Unknown                                    |
+--------------+--------------------------------------------+
| Phone        | Unavailable                                |
+--------------+--------------------------------------------+
 
 
 
 Support
 
 
+-------------------+--------------+---------+-----------------+
| Name              | Relationship | Address | Phone           |
+-------------------+--------------+---------+-----------------+
| Melissa Lau | ECON         | Unknown | [phone] |
+-------------------+--------------+---------+-----------------+
| Mike Lau   | ECON         | Unknown | [phone] |
+-------------------+--------------+---------+-----------------+
| Damaris Coyle     | ECON         | Unknown | [phone] |
+-------------------+--------------+---------+-----------------+
 
 
 
 
 Care Team Providers
 
 
+-----------------------+------+-----------------+
| Care Team Member Name | Role | Phone           |
+-----------------------+------+-----------------+
| Chandra King MD | PCP  | [phone] |
+-----------------------+------+-----------------+
 
 
 
 Encounter Details
 
 
+--------+----------+----------------------+----------------------+-------------+
| Date   | Type     | Department           | Care Team            | Description |
+--------+----------+----------------------+----------------------+-------------+
| / | Abstract |   [facility] [place]          |   Anastasia Cohn,  |             |
|    |          | [address]  [address]    | MD  [address]  |             |
|        |          | [place]  [place], |  [place], [place]     |             |
|        |          |  [place] [address]       | [address]  [phone]  |             |
|        |          | [phone]         |  [phone] (Fax)  |             |
+--------+----------+----------------------+----------------------+-------------+
 
 
 
 Social History
 
 
+---------------+-------+-----------+--------+---------------------+
| Tobacco Use   | Types | Packs/Day | Years  | Date                |
|               |       |           | Used   |                     |
+---------------+-------+-----------+--------+---------------------+
| Former Smoker |       |           |        | Started: 1992 |
+---------------+-------+-----------+--------+---------------------+
 
 
 
+-------------+-------------+---------+----------+
| Alcohol Use | Drinks/Week | oz/Week | Comments |
+-------------+-------------+---------+----------+
| No          |             |         |          |
+-------------+-------------+---------+----------+
 
 
 
+------------------+---------------+
| Sex Assigned at  | Date Recorded |
| Birth            |               |
+------------------+---------------+
| Not on file      |               |
+------------------+---------------+
 documented as of this encounter
 
 Plan of Treatment
 Not on filedocumented as of this encounter
 
 
 Visit Diagnoses
 Not on filedocumented in this encounter

## 2020-07-23 NOTE — XMS
Encounter Summary
  Created on: 2020
 
 Parrish Weeksgabe Montero
 External Reference #: [id number]
 : [birth year]
 Sex: Female
 
 Demographics
 
 
+-----------------------+----------------------+
| Address               | [address]         |
|                       | [place]  [address] |
+-----------------------+----------------------+
| Home Phone            | [phone]      |
+-----------------------+----------------------+
| Preferred Language    | Unknown              |
+-----------------------+----------------------+
| Marital Status        |               |
+-----------------------+----------------------+
| Anabaptism Affiliation | Unknown              |
+-----------------------+----------------------+
| Race                  | Unknown              |
+-----------------------+----------------------+
| Ethnic Group          | Unknown              |
+-----------------------+----------------------+
 
 
 Author
 
 
+--------------+--------------------------------------------+
| Author       | [facility]  |
|              | and [place]                                |
+--------------+--------------------------------------------+
| Organization | [facility]  |
|              | and [place]                                |
+--------------+--------------------------------------------+
| Address      | Unknown                                    |
+--------------+--------------------------------------------+
| Phone        | Unavailable                                |
+--------------+--------------------------------------------+
 
 
 
 Support
 
 
+-------------------+--------------+---------+-----------------+
| Name              | Relationship | Address | Phone           |
+-------------------+--------------+---------+-----------------+
| Melissa Lau | ECON         | Unknown | [phone] |
+-------------------+--------------+---------+-----------------+
| Mike Lau   | ECON         | Unknown | [phone] |
+-------------------+--------------+---------+-----------------+
| Damarisarmando Coyle     | ECON         | Unknown | [phone] |
+-------------------+--------------+---------+-----------------+
 
 
 
 
 Care Team Providers
 
 
+-----------------------+------+-----------------+
| Care Team Member Name | Role | Phone           |
+-----------------------+------+-----------------+
| Chandra King MD | PCP  | [phone] |
+-----------------------+------+-----------------+
 
 
 
 Reason for Visit
 
 
+--------------------+--------+----------+
| Reason             | Onset  | Comments |
|                    | Date   |          |
+--------------------+--------+----------+
| Hospital Follow-up | / |          |
|                    |    |          |
+--------------------+--------+----------+
 
 
 
 Encounter Details
 
 
+--------+-----------+----------------------+---------------------+--------------------+
| Date   | Type      | Department           | Care Team           | Description        |
+--------+-----------+----------------------+---------------------+--------------------+
| / | Telephone |   [facility] |   Karla Hinojosa,  | Hospital Follow-up |
|    |           |  [facility]    | PharmD  [address]      |                    |
|        |           | [address]  | [facility]   |                    |
|        |           | [place] [address] | [place] [address]     |                    |
|        |           |   [phone]       | [phone]  |                    |
+--------+-----------+----------------------+---------------------+--------------------+
 
 
 
 Social History
 
 
+---------------+-------+-----------+--------+---------------------+
| Tobacco Use   | Types | Packs/Day | Years  | Date                |
|               |       |           | Used   |                     |
+---------------+-------+-----------+--------+---------------------+
| Former Smoker |       |           |        | Started: 1992 |
+---------------+-------+-----------+--------+---------------------+
 
 
 
+-------------+-------------+---------+----------+
| Alcohol Use | Drinks/Week | oz/Week | Comments |
+-------------+-------------+---------+----------+
| No          |             |         |          |
+-------------+-------------+---------+----------+
 
 
 
 
+------------------+---------------+
| Sex Assigned at  | Date Recorded |
| Birth            |               |
+------------------+---------------+
| Not on file      |               |
+------------------+---------------+
 documented as of this encounter
 
 Miscellaneous Notes
 Telephone Encounter - Karla Hinojosa PharmD - 2017  2:24 PM PSTHospital Follow-Up P
janey Call
 
 Date discharged: 1/3/17
 Primary Diagnosis: NSTEMI
 
 No answer--third attempt at Transitions of Care follow-up phone call.  No further attempts 
will be made.
 
 Date of follow-up appointment with cardiologist:  @1315 with Dr. Cohn
 Follow-up with PCP: Dr. King in 1 week
 
 Electronically signed by: Karla Hinojosa PHARMD 2017 14:26
 Electronically signed by Theodore GrahamD at 2017  2:26 PM [facility] - Karla Hinojosa PharmD - 2017  3:31 PM PSTHospital Follow-Up Phone Call
 
 Date discharged: 1/3/17
 Primary Diagnosis: NSTEMI
 
 No answer; left message.  Will try again on 17.
 
 Date of follow-up appointment with cardiologist:  @ 1315 with Dr. Cohn 
 Follow-up with PCP: Dr. King in 1 week
 
 Electronically signed by: Karla Hinojosa PHARMD 2017 15:33
 Electronically signed by Karla Hinojosa PharmD at 2017  3:33 PM PSTTelephone [facility]
er - Karla Hinojosa PharmD - 2017  3:58 PM PSTHospital Follow-Up Phone Call
 
 Date discharged: 1/3/17
 Primary Diagnosis: NSTEMI
 
 No answer; left message.  Will try again tomorrow (17).
 
 Date of follow-up appointment with cardiologist:  @ 1315 with Dr. Cohn
 Follow-up with PCP: Dr. King in 1 week
 
 Electronically signed by Karla Hinojosa PharmROGERIO at 2017  4:05 PM PSTdocumented in thi
s encounter
 
 Plan of Treatment
 Not on filedocumented as of this encounter
 
 Visit Diagnoses
 Not on filedocumented in this encounter

## 2020-07-23 NOTE — XMS
Encounter Summary
  Created on: 2020
 
 Parrish Weeksgabe Montero
 External Reference #: [id number]
 : [birth year]
 Sex: Female
 
 Demographics
 
 
+-----------------------+----------------------+
| Address               | [address]         |
|                       | [place]  [address] |
+-----------------------+----------------------+
| Home Phone            | [phone]      |
+-----------------------+----------------------+
| Preferred Language    | Unknown              |
+-----------------------+----------------------+
| Marital Status        |               |
+-----------------------+----------------------+
| Rastafarian Affiliation | Unknown              |
+-----------------------+----------------------+
| Race                  | Unknown              |
+-----------------------+----------------------+
| Ethnic Group          | Unknown              |
+-----------------------+----------------------+
 
 
 Author
 
 
+--------------+--------------------------------------------+
| Author       | [facility]  |
|              | and [facility]                                |
+--------------+--------------------------------------------+
| Organization | [facility]  |
|              | and [facility]                                |
+--------------+--------------------------------------------+
| Address      | Unknown                                    |
+--------------+--------------------------------------------+
| Phone        | Unavailable                                |
+--------------+--------------------------------------------+
 
 
 
 Support
 
 
+-------------------+--------------+---------+-----------------+
| Name              | Relationship | Address | Phone           |
+-------------------+--------------+---------+-----------------+
| Melissa Lau | ECON         | Unknown | [phone] |
+-------------------+--------------+---------+-----------------+
| Mike Lau   | ECON         | Unknown | [phone] |
+-------------------+--------------+---------+-----------------+
| Damaris Coyle     | ECON         | Unknown | [phone] |
+-------------------+--------------+---------+-----------------+
 
 
 
 
 Care Team Providers
 
 
+-----------------------+------+-----------------+
| Care Team Member Name | Role | Phone           |
+-----------------------+------+-----------------+
| Chandra King MD | PCP  | [phone] |
+-----------------------+------+-----------------+
 
 
 
 Reason for Visit
 Auth/Cert
 
+--------+--------+-----------+--------------+--------------+--------------+
| Status | Reason | Specialty | Diagnoses /  | Referred By  | Referred To  |
|        |        |           | Procedures   | Contact      | Contact      |
+--------+--------+-----------+--------------+--------------+--------------+
|        |        |           |   Diagnoses  |              |              |
|        |        |           |  Chest pain  |              |              |
|        |        |           |  chest pain  |              |              |
|        |        |           |  NSTEMI      |              |              |
|        |        |           | Procedures   |              |              |
|        |        |           | CV COR ANGIO |              |              |
+--------+--------+-----------+--------------+--------------+--------------+
 
 
 
 
 Encounter Details
 
 
+--------+-----------+----------------------+----------------------+----------------------+
| Date   | Type      | Department           | Care Team            | Description          |
+--------+-----------+----------------------+----------------------+----------------------+
| / | [facility]  |   [facility] |   Anastasia Brasher,  | Acquired             |
| 2017 - | Encounter |  MED CTR ICU  [address]  | MD  [address]  | hypothyroidism       |
|        |           | [place]  [place], |  [place]     | (Primary Dx); NSTEMI |
| / |           |  [place] [address]       | [address]  [phone]  |  (non-ST elevated    |
|    |           | [phone]         |  [phone] (Fax)  | myocardial           |
|        |           |                      |                      | infarction) (HCC);   |
|        |           |                      |                      | Pure                 |
|        |           |                      |                      | hypercholesterolemia |
|        |           |                      |                      | ; Anomalous right    |
|        |           |                      |                      | coronary artery      |
+--------+-----------+----------------------+----------------------+----------------------+
 
 
 
 Social History
 
 
+---------------+-------+-----------+--------+---------------------+
| Tobacco Use   | Types | Packs/Day | Years  | Date                |
|               |       |           | Used   |                     |
+---------------+-------+-----------+--------+---------------------+
| Former Smoker |       |           |        | Started: 1992 |
 
+---------------+-------+-----------+--------+---------------------+
 
 
 
+-------------+-------------+---------+----------+
| Alcohol Use | Drinks/Week | oz/Week | Comments |
+-------------+-------------+---------+----------+
| No          |             |         |          |
+-------------+-------------+---------+----------+
 
 
 
+------------------+---------------+
| Sex Assigned at  | Date Recorded |
| Birth            |               |
+------------------+---------------+
| Not on file      |               |
+------------------+---------------+
 documented as of this encounter
 
 Last Filed Vital Signs
 
 
+-------------------+----------------------+----------------------+----------+
| Vital Sign        | Reading              | Time Taken           | Comments |
+-------------------+----------------------+----------------------+----------+
| Blood Pressure    | 92/52                | 2017 11:42 AM  |          |
|                   |                      | PST                  |          |
+-------------------+----------------------+----------------------+----------+
| Pulse             | 59                   | 2017 11:37 AM  |          |
|                   |                      | PST                  |          |
+-------------------+----------------------+----------------------+----------+
| Temperature       | 37   C (98.6   F)    | 2017 11:37 AM  |          |
|                   |                      | PST                  |          |
+-------------------+----------------------+----------------------+----------+
| Respiratory Rate  | 18                   | 2017 11:37 AM  |          |
|                   |                      | PST                  |          |
+-------------------+----------------------+----------------------+----------+
| Oxygen Saturation | 94%                  | 2017 11:37 AM  |          |
|                   |                      | PST                  |          |
+-------------------+----------------------+----------------------+----------+
| Inhaled Oxygen    | -                    | -                    |          |
| Concentration     |                      |                      |          |
+-------------------+----------------------+----------------------+----------+
| Weight            | 74.6 kg (164 lb 7.4  | 2017  3:58 AM  |          |
|                   | oz)                  | PST                  |          |
+-------------------+----------------------+----------------------+----------+
| Height            | 160 cm (5' 3")       | 2017 12:25 PM  |          |
|                   |                      | PST                  |          |
+-------------------+----------------------+----------------------+----------+
| Body Mass Index   | 29.13                | 2017 12:25 PM  |          |
|                   |                      | PST                  |          |
+-------------------+----------------------+----------------------+----------+
 documented in this encounter
 
 Discharge Summaries
 Anastasia Brasher MD - 2017  8:54 AM PSTFormatting of this note might be different fr
om the original.
 
 
 
 DISCHARGE SUMMARY
 
 PATIENT NAME/:  Sera Weeks, (1945)
 MEDICAL RECORD NUMBER: [id number]
 DATE OF ADMISSION:  2017
 DATE OF DISCHARGE:  1/3/2017
 DISCHARGING PHYSICIAN: Anastasia Brasher MD
   
 
 ADMITTING DIAGNOSIS:  NSTEMI (non-ST elevated myocardial infarction) 
 PRIMARY CARE PROVIDER: Chandra King MD
 
 DISPOSITION:   Discharge to home
 
 BRIEF HOSPITAL COURSE:
    Please see the history and physical at the time of admission.  Briefly, Ms. Weeks is a [age] y.o. female who was admitted on 2017 with NSTEMI (non-ST elevated myocardial infarctio
n) .
 She was asymptomatic after admission.  
 She had recently experienced a headache behind her right eye that was unusual for her.
 We first obtained a Head CT.  That was unremarkable.
 
 She was then taken to cath:
 FINDINGS:
 Hemodynamics:
 Left ventricular end-diastolic pressure (LVEDP) was 14 mm Hg.  
 There was no gradient across the aortic valve.
 
 Left Ventriculogram: Not performed 
 Left main coronary artery: Normal 
 Left anterior descending coronary artery: Large vessel. No 
 significant disease.
 Circumflex coronary artery: Dominant. Gives off 2 marginal and 
 the PDA. No significant disease. 10% in the AV groove.
 Right coronary artery: Small non dominant vessel anomalous 
 origin from the left coronary cusp. Minimal luminal 
 irregularity.
 
 Aortic Root: 
 Enlarged 
 No AI 
 No obvious dissection 
 Possible small aortic ulceration 
 CONCLUSIONS:
 1. Dilated Aortic Root
 2. No AI
 3. Normal Left Main
 4. Normal LAD
 5. Dominant LCX. 10% luminal irregularity in AV groove. Gives 
 off the PDA
 6. Non dominant RCA. Mild plaque. Arises anomalously from the 
 left cusp
 
 A CT angio of the chest was done and there was no evidence of dissection.
 Her aorta was minimally enlarged.
 An echo was done and her LV function was normal.  Her aortic root was at the upper limit of
 normal.
 
 In the first 24 hours she had some PVC and run of VT 3 beats.  She then had no arrhythmia.
 
 
 She felt well and denied pain.
 
 She was ambulated without problem.  Her blood pressure was normal and she could not tolerat
e a lot of medication.  
 Condition at discharge:
 Ambulating normally
 Pain free
 No arrhythmia
 
 HOSPITAL PROBLEMS: 
 Principal Problem:
   NSTEMI (non-ST elevated myocardial infarction) 
 Active Problems:
   Pure hypercholesterolemia
   Acquired hypothyroidism
   Depression
   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
 
 MASTER PROBLEM LIST: 
 Patient Active Problem List 
  Diagnosis Date Noted POA 
   Pure hypercholesterolemia 2017 Unknown 
   NSTEMI (non-ST elevated myocardial infarction)  2017 Unknown 
   Acquired hypothyroidism 2017 Unknown 
   Depression 2017 Unknown 
 
 VITALS:
 Temp: 36.2 C (97.2 F)   BP: 104/58 mmHg       Pulse: 56      Resp: 16     SpO2: 95 %
 
 WEIGHT:
 Today's Weight: 74.6 kg (164 lb 7.4 oz)
   Admit  Weight: 74.3 kg (163 lb 12.8 oz)
 
 BRIEF EXAM TODAY:
  General Appearance - alert, in no distress
  Heart -  regular rate and rhythm, S1 and S2 normal, no murmur, rub, or gallop.
  Lungs - clear to auscultation bilaterally
  Musc/Skel -  moves all extremities
  Extremities - no cyanosis.  no significant edema
  Neurologic - no focal deficits
  no hematoma
 
 CONSULTATION:
 Dietitian
 
 DIAGNOSTIC STUDIES: 
 Imaging: 
 Cath:
 Informed consent was obtained from the patient, and a time-out was performed to verify the 
patient's identification and planned procedure. Please refer to the computer log entry for
m for precise details. The patient's right radial artery was sterilely prepped. Arterial
 access was achieved with micropuncture kit. Patient was then given intravenous heparin 
as well as intra-arterial nitroglycerin and Nicardipine through the sheath. A 5 French JR4
 and a 3 DRC did not fit the coronary. A pullback across the valve was used to assess if t
here was a gradient across the aortic valve A JL 3.5 did not fit the left system nor did a
 LBU 3 did not fit. A JL3 sat close to the anomalous RCA and also opacified the Left. A 
pig tail was used to perform an aortic root. . 
 The TR band occluder device was utilized to achieve successful hemostasis of the radial art
mik. There were no immediate complications. 
 
 
  Estimated blood loss was: 10 cc.
  Fluro Time: 8.9 Min 
  Total Reference Air Kerma: 443.55 mGy
  Contrast: 105 mls of Omnipaque 350
 
 FINDINGS:
 Hemodynamics:
 Left ventricular end-diastolic pressure (LVEDP) was 14 mm Hg. There was no gradient a
cross the aortic valve.
 
 Left Ventriculogram: Not performed 
 Left main coronary artery: Normal 
 Left anterior descending coronary artery: Large vessel. No significant disease.
 Circumflex coronary artery: Dominant. Gives off 2 marginal and the PDA. No significan
t disease. 10% in the AV groove.
 Right coronary artery: Small non dominant vessel anomalous origin from the left coronary 
cusp. Minimal luminal irregularity.
 
 Aortic Root: 
 Enlarged 
 No AI 
 No obvious dissection 
 Possible small aortic ulceration 
 CONCLUSIONS:
 1. Dilated Aortic Root
 2. No AI
 3. Normal Left Main
 4. Normal LAD
 5. Dominant LCX. 10% luminal irregularity in AV groove. Gives off the PDA
 6. Non dominant RCA. Mild plaque. Arises anomalously from the left cusp
 Echo 17
 Procedure date
 Date: 2017Start: 10:22 AM
 
 Technical Quality: Adequate visualizationStudy Location: ICU
 
 Patient Status: Routine
 Height: 62.99 inchesWeight: 163 poundsBSA: 1.77 m^2BMI: 28.88 kg/m^2
 HR: 54 bpm
 
 Conclusions
 Summary
 Normal Left Ventricular contractility was noted.
 Left ventricle is normal in size and function. Ejection fraction is
 estimated at 62%.
 Trace AI
 Dilated left atrium.
 aortic root at the upper limits of normal in size
 normal IVC
 
 ECG: 
 
 Head CT:
  UNENHANCED AND ENHANCED HEAD CT 2017 2:23 PM
 
 CLINICAL HISTORY: pain behind right eye  
 
 COMPARISON: None available
 
 TECHNIQUE: Axial images are performed through the head both before and after the
 
 uneventful intravenous administration of 80 cc Omnipaque 350 contrast. Coronal
 and sagittal reformations are also performed. 
 
 FINDINGS: There is mild cerebral atrophy and mild, patchy hypoattenuation
 involving the deep and periventricular cerebral white matter. Gray-white
 differentiation is maintained. The ventricles, brainstem and cerebellum are
 unremarkable. There is no mass effect, abnormal enhancement, evidence of
 intracranial hemorrhage or extra-axial fluid collection/mass. Bilateral
 prosthetic ocular lenses are present. The orbital contents are otherwise
 symmetric and unremarkable, without visible mass lesion or proptosis. There is
 mild mucous membrane thickening within bilateral ethmoid air cells. Imaged
 paranasal sinuses are otherwise well aerated, along with the middle ear cavities
 and imaged after air cells. Asymmetric degeneration of the left greater than
 right temporomandibular joints is noted.
 
 There is calcified plaque within the cavernous segments of the internal carotid
 arteries. Major intracranial vessels and dural sinuses otherwise appear patent
 and grossly unremarkable. No conclusive aneurysm is visible.
 
 IMPRESSION - 
 1. NO VISIBLE MASS, ANEURYSM OR OTHER POTENTIAL ETIOLOGY FOR RETRO-ORBITAL
 PAIN.
 
 2. MILD CEREBRAL ATROPHY AND PROBABLE CHRONIC, MICROVASCULAR ISCHEMIC CHANGE OF
 THE CEREBRAL WHITE MATTER.
 
 3. MILD ETHMOID SINUS DISEASE.
 
 4. ASYMMETRIC DEGENERATION OF THE TEMPOROMANDIBULAR JOINTS.
 
 Dictated and Signed by: Андрей Payton MD 
 Electronically signed: 2017 2:36 PM
  
  
 
 CT angio of chest and abdomen:
 HISTORY: Rule out dissection.
 
 COMPARISON: None.
 
 PROTOCOL: Axial images of the chest and abdomen were obtained before and after
 uneventful administration of 85 mL Omnipaque 350. Coronal and sagittal
 reformations were acquired.
 
 FINDINGS:
 There is moderate atherosclerosis of the aorta extending into the branches. The
 ascending thoracic aorta is mildly ectatic and measures 3.8 cm. There is no
 evidence for dissection. The celiac artery, SMA, and TWYLA are patent. The
 bilateral renal arteries are normal. There is mild to moderate atherosclerosis
 of the iliac arteries.
 
 Neck base is normal. The heart is of normal size. The pulmonary arteries are
 unremarkable. SVC is normal. No enlarged lymph nodes are seen in the
 mediastinum, kourtney, or axilla. Trachea and esophagus are normal. There is mild
 dependent atelectasis of the bilateral lungs.
 
 The liver demonstrates normal parenchyma. The gallbladder is normal. Biliary
 ducts are unremarkable. The spleen is unremarkable. The pancreas demonstrates
 normal parenchyma and a normal pancreatic duct. Adrenal glands are normal. The
 right kidney and visualized ureter are normal. The left kidney and visualized
 
 ureter are normal. Stomach is normal. Imaged small bowel and colon show no acute
 findings. There is no significant abnormality in the portal veins, mesenteric
 veins, or systemic veins. No enlarged lymph nodes are visualized within the
 omentum or retroperitoneum. There is no evidence for ascites or free air.
 
 Body wall soft tissue structures are normal. There is mild S-shaped curvature of
 the thoracolumbar spine. Mild to moderate spondylosis is present.
 
 IMPRESSION -
 No evidence for aortic dissection.
 
 A preliminary report was sent by [facility] with no significant
 discrepancy.
 
 Selected Labs: 
 
 Recent Labs
 Lab 17
 0349 17
 0447 17
 1244 
 WBC 4.4  --  4.2 
 HGB 12.5  --  13.9 
 HCT 36.4  --  41.2 
 *  --  129* 
  144 141 
 K 3.8 3.4* 3.7 
 BUN 10 8 13 
 CREA 0.72 0.62 0.54* 
 GLU 95 82 85 
 CALCIUM 8.8 8.4 9.1 
 
 Recent Labs
 Lab 17
 0447 
 HDL 44 
 LDL 81 
 TRIG 122 
 
 Recent Results (from the past 24 hour(s)) 
 ECHO Complete 
  Collection Time: 17 11:00 
 Result Value Ref Range 
  LVEF-TTE TRANSTHORACIC ECHO 62  
 D-Dimer 
  Collection Time: 17 12:33 
 Result Value Ref Range 
  D-DIMER, QUANTITATIVE 0.61 (H) <=0.50 ug/ml 
 CBC no Differential 
  Collection Time: 17  3:49 
 Result Value Ref Range 
  WBC 4.4 4.0-11.0 K/uL 
  RBC 3.85 3.70-5.20 M/uL 
  Hgb 12.5 11.5-16.0 g/dL 
  Hct 36.4 34.0-47.0 % 
  MCV 94.6 83.0-101.0 fL 
  MCH 32.5 28.0-35.0 pg 
  MCHC 34.4 32.0-36.0 g/dL 
  RDW-CV 13.1 <15.0 % 
  Platelet Count 125 (L) 140-440 K/uL 
 
  MPV 9.6 fL 
 Basic Metabolic Panel 
  Collection Time: 17  3:49 
 Result Value Ref Range 
   136-149 mmol/L 
  K 3.8 3.5-5.1 mmol/L 
    mmol/L 
  CO2 29 24-31 mmol/L 
  ANION GAP 7 3-16 mmol/L 
  GLUCOSE 95  mg/dL 
  BUN 10 7-18 mg/dL 
  Creatinine, Serum/Plasma 0.72 0.60-1.30 mg/dL 
  eGFR if not AFRICAN AMERICAN >60 >=60 mL/min/1.73m2 
  CALCIUM 8.8 8.3-10.5 mg/dL 
  BUN/CREA 13.9  
 D-Dimer 
  Collection Time: 17  3:49 
 Result Value Ref Range 
  D-DIMER, QUANTITATIVE 0.49 <=0.50 ug/ml 
 Troponin I 
  Collection Time: 17  3:50 
 Result Value Ref Range 
  Troponin I 1.28 (HH) <0.06 ng/mL 
 
 MEDS:
  
 Discharge Medications 
  
 New Medications  
    Details 
  metoprolol tartrate 25 mg tablet 
  Take 1 tablet by mouth 2 times daily. 
 aka:  LOPRESSOR 
  
  nitroglycerin 0.4 mg SL tablet 
  Place 1 tablet under the tongue every 5 minutes as needed for Chest pain. 
 aka:  NITROSTAT 
  
  
 Unchanged Medications  
    Details 
  adult multivitamin with minerals/iron Tabs 
  Take 1 tablet by mouth Daily. 
  
  aspirin 81 mg EC tablet 
  Take 81 mg by mouth Daily. 
  
  cholecalciferol 1000 UNITS Tabs 
  Take 1,000 Units by mouth Daily. 
 aka:  VITAMIN D-3 
  
  FLUoxetine 20 mg capsule 
  Take 20 mg by mouth Daily. 
 aka:  PROzac 
  
  levothyroxine 100 mcg tablet 
  Take 100 mcg by mouth every morning (before breakfast). 
 aka:  SYNTHROID, LEVOTHROID 
  
  simvastatin 40 mg tablet 
 
  Take 40 mg by mouth nightly. 
 aka:  ZOCOR 
  
  tolterodine 4 MG 24 hr capsule 
  Take 4 mg by mouth Daily. 
 aka:  DETROL LA 
  
  
 Discontinued Medications  
    
  tocopherol 400 units capsule 
 aka:  VITAMIN E 
  
  
 
 PATIENT INSTRUCTIONS:
 
 ACTIVITY:
 For 24 Hours:  Do NOT drive
 
 For 2 days:  Do NOT lift more than 1 pound with the affected arm.
           Keep the wrist dry, clean.  No dishwashing, hot tub.
                      Avoid wrist movement such as bike riding, golf.
 
 For 5 days:  Avoid vigorous exercise that uses the affected arm.
 Do not lift greater than 10 pounds until cleared 
 
 DIET:
 cardiac diet
 
 FOLLOW-UP:
 Follow up with Dr Brasher week  follow up with Dr King in a week.
 
 Time spent on discharge planning: less than 30 minutes
 
 Electronically signed by: 
 Anastasia Brasher MD on 1/3/2017 at 8:54Electronically signed by Anastasia Brasher MD at 01/0
3/2017  9:49 AM PSTdocumented in this encounter
 
 Discharge Instructions
 Instructions Anastasia Brasher MD - 2017For 24 Hours:  Do NOT drive
 
 For 2 days:  Do NOT lift more than 1 pound with the affected arm.
           Keep the wrist dry, clean.  No dishwashing, hot tub.
                      Avoid wrist movement such as bike riding, golf.
 
 For 5 days:  Avoid vigorous exercise that uses the affected arm.
 Do not lift greater than 10 pounds until cleared 
 
 Pain
  It is common to be sore for 1 to 2 days at the catheter insertion site.  You may take ac
etaminophen (Tylenol) for pain relief.  Follow the dosing instructions on the package.
 
  Take your regular aspirin as prescribed.
 
  Do NOT take other products that contain aspirin.  Do NOT take other anti-inflammatory pr
oducts such as ibuprofen (Advil, Motrin) or naproxen (Aleve, Naprosyn).  They may cause incr
eased bleeding.
 
  If you have severe pain at the catheter site, call your provider.
 
 
 Site Care
  You may remove the dressing or bandage the day after your procedure.
 
  Keep site clean and dry.  Clean the site gently with mild soap and water.  Re-apply a cl
[place] Band-Aid, if needed.
 
  It is normal to have a small bruise or lump at the insertion site.
 
  You may shower the day after your procedure, but avoid tub baths, hot tubs or swimming f
or the next 2 days.
 
  Inspect the site everyday for infection.  (see " When to Call for Help" on the next page
)
 
 Fluids
  Drink an extra 2 quarts of water, in addition to your normal fluid intake in the next 24
 hours.
  This helps the body eliminate the contrast dye given to you during your procedure.
  If you are on a fluid restriction follow orders from your physician.
 
 When to Call for Help
 
 If you have bleeding at the site, apply pressure to the site with clean fingers for 10 chioma
bryson.  
 Phone [phone]
 
  If the bleeding does not stop in 10 minutes, or there is a large amount of bleeding, heri
l 9-1-1.  Continue to apply pressure over the site until help arrives.
 
  If the bleeding stops, sit quietly for 2 hours while you keep the affected wrist straigh
t.  Call the cardiologist who did your procedure as soon as possible.
 
 Other Concerns
 Call the cardiologist who did your procedure if you have:
 
 Any of these Signs of Infection:
  Redness
  Fever higher than 101.5 degrees F or 38.6 degrees C
 
  Change in the bruise or lump.
  Numbness in your arm or wrist.
  Severe pain that is not relieved by Tylenol.
 
 Follow-up Care
  Continue your prescribed medications unless instructed otherwise.
 
  Follow-up with your primary health care provider and cardiologist after your procedure, 
as instructed.
 
  If you have questions or concerns about your cardiac catheterization procedure, call the
 number below.  
 
 
 documented in this encounter
 
 Medications at Time of Discharge
 
 
+----------------------+----------------------+-----------+---------+----------+-----------+
 
| Medication           | Sig                  | Dispensed | Refills | Start    | End Date  |
|                      |                      |           |         | Date     |           |
+----------------------+----------------------+-----------+---------+----------+-----------+
|   aspirin 81 mg EC   | Take 81 mg by mouth  |           | 0       |          |           |
| tablet               | Daily.               |           |         |          |           |
+----------------------+----------------------+-----------+---------+----------+-----------+
|   cholecalciferol    | Take 1,000 Units by  |           | 0       |          |           |
| (VITAMIN D-3) 1000   | mouth Daily.         |           |         |          |           |
| UNITS TABS           |                      |           |         |          |           |
+----------------------+----------------------+-----------+---------+----------+-----------+
|   FLUoxetine         | Take 20 mg by mouth  |           | 0       |          |           |
| (PROZAC) 20 mg       | Daily.               |           |         |          |           |
| capsule              |                      |           |         |          |           |
+----------------------+----------------------+-----------+---------+----------+-----------+
|   levothyroxine      | Take 100 mcg by      |           | 0       |          |           |
| (SYNTHROID,          | mouth every morning  |           |         |          |           |
| LEVOTHROID) 100 mcg  | (before breakfast).  |           |         |          |           |
| tablet               |                      |           |         |          |           |
+----------------------+----------------------+-----------+---------+----------+-----------+
|   Multiple           | Take 1 tablet by     |           | 0       |          |           |
| Vitamins-Minerals    | mouth Daily.         |           |         |          |           |
| (ADULT MULTIVITAMIN  |                      |           |         |          |           |
| WITH MINERALS/IRON)  |                      |           |         |          |           |
| TABS                 |                      |           |         |          |           |
+----------------------+----------------------+-----------+---------+----------+-----------+
|   nitroglycerin      | Place 1 tablet under |   25      | 1       | 20 |           |
| (NITROSTAT) 0.4 mg   |  the tongue every 5  | tablet    |         | 17       |           |
| SL tablet            | minutes as needed    |           |         |          |           |
|                      | for Chest pain.      |           |         |          |           |
+----------------------+----------------------+-----------+---------+----------+-----------+
|   simvastatin        | Take 40 mg by mouth  |           | 0       |          |           |
| (ZOCOR) 40 mg tablet | nightly.             |           |         |          |           |
+----------------------+----------------------+-----------+---------+----------+-----------+
|   tolterodine        | Take 4 mg by mouth   |           | 0       |          |           |
| (DETROL LA) 4 MG 24  | Daily.               |           |         |          |           |
| hr capsule           |                      |           |         |          |           |
+----------------------+----------------------+-----------+---------+----------+-----------+
|   metoprolol         | Take 1 tablet by     |   180     | 3       | 20 |  |
| tartrate (LOPRESSOR) | mouth 2 times daily. | tablet    |         | 17       | 7         |
|  25 mg tablet        |                      |           |         |          |           |
+----------------------+----------------------+-----------+---------+----------+-----------+
 documented as of this encounter
 
 Progress Notes
 Karla Hinojosa PharmD - 2017 10:31 AM Lorna Kylah Weeks was admitted for NSTEMI a
nd discharged home today (1/3/2017)
 
 Taught AVS education to patient. Education was focused on new medications and/or changed me
dications. I explained indication, how to take, possible side effects, when to contact physi
yehuda, and monitor parameters.
 
 The patient was encouraged to make a follow-up appointment with PCP and cardiologist. 
 
 The patient verbalized understanding of the above and all questions were answered. Pharmaci
st will follow-up with patient in one to two business days. Patient was provided with a izzy
nciled discharge medication list as part of their AVS instructions. Encouraged patient to sh
are medication list with healthcare providers and keep list current.
 
 Karla Hinojosa PHARMD 1/3/2017 10:31
 Electronically signed by Karla Hinojosa PharmD at 2017 10:31 AM Sharifa Martell P harmD - 2017  5:25 PM PSTFormatting of this note might be different from the original.
 PHARMACY SERVICES: ADMISSION MEDICATION REVIEW
 
 Sera Weeks is a 71 y.o. female admitted on 17.
 
 Patient is a reliable historian. 
 
 Location of Patient when reviewed:
 [] ED   [x] Medical Floor
 
 Patient  s prior to admit medication and over the counter (OTC) medications/herbal supplem
ents list obtained from:
 
 [x] Verbal interview (patient is able to recall drug name, strength, frequency, etc.) 
 [] Patient/family member provided a complete current medication list or bottles
 [] MAR from [facility] facility: 
 [] Doctor's office:
 [] Pharmacy    list names: 
 [x] [employer] insurance reported information
 [] Care Everywhere 
 [] Other sources: 
 
 Vaccines up to date?  
  Yes No Unsure 
 Influenza [x] [] [] 
 Pneumococcal [x] [] [] 
 Tdap [x] [] [] 
 Shingles [x] [] [] 
 
 Noted medications discrepancies or medication-related issues: 
 
  Medication added: 
 Medication: Prior to Admission Sig: 
 Cholecalciferol 1000 u 1 tab daily 
 Tocopherol 400 u 1 cap daily 
 
 Medication review performed and electronically signed by Joe Gong Pharm Tech 
017 17:15
 Sharifa Gibbons 2017 17:24
 Electronically signed by Sharifa Posada PharmD at 2017  5:25 PM Anastasia Jameson MD - 2017  9:40 AM PSTFormatting of this note might be different from the original.
   
 
 PATIENT NAME:  Sera Weeks  
 : 1945:         AGE: 71 y.o.
  PRIMARY CARE:
 Chandra King MD 
 
 INPATIENT FOLLOW UP VISIT
 
 Date of Service: 17
 
 HISTORY OF PRESENT ILLNESS:
 Sera Weeks is a 71 y.o. female with a history of NSTEMI yesterday.  She is being seen tod
 for follow up.
 
 She feels well.  No chest pain or shortness of breath.  She had some asymptomatic NSVT over
 night.
 
 MEDICAL, SURGICAL, AND PERSONAL HISTORY
 
 Past Medical, Surgical, Family, and Social History are reviewed in EPIC.
 
 CURRENT PROBLEMS
 Patient Active Problem List 
 Diagnosis 
   Pure hypercholesterolemia 
   NSTEMI (non-ST elevated myocardial infarction)  
   Acquired hypothyroidism 
   Depression 
 
 CURRENT MEDICATIONS
 Current Facility-Administered Medications 
 Medication Dose Route Frequency Provider Last Rate Last Dose 
   acetaminophen (TYLENOL) tablet 650 mg  650 mg Oral Q6H PRN Anastasia Brasher MD   650 mg
 at 17 2357 
   aluminum & magnesium hydroxide-simethicone (MAALOX PLUS REGULAR STRENGTH) 200-200-20 mg
/5 mL suspension 30 mL  30 mL Oral Q4H PRN Anastasia Brasher MD     
   aspirin EC tablet 81 mg  81 mg Oral Daily Anastasia Brasher MD   81 mg at 17 0842 
   atorvaSTATin (LIPITOR) tablet 40 mg  40 mg Oral Nightly Anastasia Brasher MD   40 mg at 
17 205 
   FLUoxetine (PROzac) capsule 20 mg  20 mg Oral Daily Anastasia Brasher MD   20 mg at  1727 
   levothyroxine (SYNTHROID, LEVOTHROID) tablet 100 mcg  100 mcg Oral QAM AC Anastasia Anguiano ms, MD   100 mcg at 17 0632 
   lidocaine 1%-EPINEPHrine 1:100,000 injection 5 mL  5 mL Infiltration Once PRN Anastasia clay MD     
   magnesium sulfate 2 g/50 mL IVPB 2 g  2 g Intravenous Once Anastasia Brasher MD 25 mL/hr
 at 17 0842 2 g at 17 0842 
   metoprolol tartrate (LOPRESSOR) tablet 50 mg  50 mg Oral BID Anastasia Brasher MD   50 m
g at 17 0842 
   nitroglycerin (NITROSTAT) SL tablet 0.4 mg  0.4 mg Sublingual Q5 Min PRN Anastasia santos MD     
   ondansetron (ZOFRAN) injection 4-8 mg  4-8 mg Intravenous Q6H PRN Anastasia Brasher MD  
   
  
 
 ALLERGIES
 Allergies 
 Allergen Reactions 
   Codeine Itching 
 
 ROS
 Otherwise negative
 OBJECTIVE:  
 
 PHYSICAL EXAM
 BP 87/54 mmHg | Pulse 60 | Temp(Src) 36.4 C (97.5 F) (Oral) | Resp 18 | Ht 1.6 m (5' 3"
) | Wt 75.3 kg (166 lb 0.1 oz) | BMI 29.41 kg/m2 | SpO2 93%
   General appearance: no acute distress.
   Eyes: no icterus. Lids normal
   Mouth: no cyanosis.
   Neck: No lymphadenopathy in the neck or supraclavicular area. Good carotid upstroke.   No
 bruits. No JVD
   Lungs: CTA    
   Heart: normal sounds  no murmur
   Abdomen: soft.
   Ext: No CCE in upper or lower extremities
   Neuro: Awake and oriented x3
 
 ECG:  
 
 
 LAB RESULTS: 
 LIPID
 Will add on 
 
 CHEMISTRY
 Lab Results 
 Component Value Date 
  GLU 82 2017 
   2017 
  K 3.4* 2017 
  * 2017 
  CO2 25 2017 
  CALCIUM 8.4 2017 
  ALKPHOS 41 2017 
  AST 30 2017 
  ALT 16 2017 
  BILITOT 0.6 2017 
  CREA 0.62 2017 
  BUN 8 2017 
 
 HEMATOLOGY
 Lab Results 
 Component Value Date 
  WBC 4.2 2017 
  HGB 13.9 2017 
  HCT 41.2 2017 
  * 2017 
 
 ASSESSMENT:  
 NSTEMI
 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
 Small ascending aortic aneurysm without dissection
 Non sustained VT in the first 24 hours of MI
 
 PLAN:  
 Echo for LV function and to assess infarct location
 Replace K
 Add magnesium
 Beta blocker
 
 Electronically signed by: Anastasia Brasher MD [facility] 2017 
 
 Portions of this chart may have been created with Dragon voice recognition software. Occasi
onal wrong-word or   sound-alike  substitutions may have occurred due to the inherent martell
itations of voice recognition software. Please read the chart carefully and recognize, using
 context, where these substitutions have occurred.
 Electronically signed by Anastasia Brasher MD at 2017  9:49 AM [facility]
 - 2017  2:47 PM PSTHead CT is negative.
 The risk and benefits of appropriate heart  catheterization with possible balloons, stents,
 or other appropriate techniques as indicated were discussed.  It was explained that the pos
sible complications include but are not limited to death, stroke, heart arrest,heart attack,
 emergency surgery, blood vessel injury possibly requiring surgical repair, site infection, 
acute kidney failure, and/or reactions to iodine contrast agent or sedatives. Long term risk
s include re-blockage (restenosis) and stent thrombosis (clotting).    The patient's questio
ns were answered, and the patient wishes to proceed. Patient is appropriate for conscious se
dation.
 Types of stents were discussed and DAPHNEY would be most appropriate for this patient.Jong bravo signed by Anastasia Brasher MD at 2017  2:48 PM PSTdocumented in this encounter
 
 
 H&P Notes
 Anastasia Brasher MD - 2017 12:59 PM PSTFormatting of this note might be different fr
om the original.
                           Admission H&P
 
 Referring Provider: ER [facility] 
 Primary Care: Dr King
 
 Reason for Admission: Unstable Angina 
 
 2017
 Sera Weeks is a 71 y.o. female
 
 History of Present Illness:
 Very pleasant 70 y/o female who awoke with jaw pain at 3:30 in the morning.  She has never 
had anything like this before.  It started on the right and then spread to both sides of her
 jaw and her tongue.
 She was seen in the ER at [facility].  She was given ASA and NTG  First set of enzymes were
 negative and second set Trop was 0.11.  She was going to get Heparin.  This was not started
.
 She is asymptomatic.  It was present for about 10 hours.
 She was a little dizzy this am.  She did not have dyspnea or diaphoresis.
 
 Past Medical History:
 Dyslipidemia
 Hypothyroidism
 Depression
 Urinary incontinence
 
 Current Medications:
 Home Medications 
 Medication Sig 
   aspirin 81 mg EC tablet Take 81 mg by mouth Daily. 
   FLUoxetine (PROZAC) 20 mg capsule Take 20 mg by mouth Daily. 
   levothyroxine (SYNTHROID, LEVOTHROID) 100 mcg tablet Take 100 mcg by mouth every mornin
g (before breakfast). 
   Multiple Vitamins-Minerals (ADULT MULTIVITAMIN WITH MINERALS/IRON) TABS Take 1 tablet b
y mouth Daily. 
   simvastatin (ZOCOR) 40 mg tablet Take 40 mg by mouth nightly. 
   tolterodine (DETROL LA) 4 MG 24 hr capsule Take 4 mg by mouth Daily. 
 
 Allergies:
 
 Allergies 
 Allergen Reactions 
   Codeine Itching 
 
 Family History:
 
 No family history on file.
 
 Social History:
 
 Social History 
 
 Social History 
   Marital Status: N/A 
   Spouse Name: N/A 
   Number of Children: N/A 
   Years of Education: N/A 
 
 
 Occupational History 
   Not on file. 
 
 Social History Main Topics 
   Smoking status: Former Smoker 
   Start date: 1992 
   Smokeless tobacco: Not on file 
   Alcohol Use: No 
   Drug Use: Not on file 
   Sexual Activity: Not on file 
 
 Other Topics Concern 
   Not on file 
 
 Social History Narrative 
   No narrative on file 
 
 Review of Systems:
 For the last weeks she has had pain behind her right eye and in her right eye brow.
 Last episode was today
 Some tingling in right hand
 Hands are cool - no changes of red, white or blue
 Review of Systems 
 Constitutional: Negative for fever, chills, weight loss, malaise/fatigue and diaphoresis. 
 HENT: Negative for congestion, ear discharge, ear pain, hearing loss, nosebleeds and tinnit
us.  
 Eyes: Positive for pain. Negative for blurred vision, double vision, photophobia, discharge
 and redness. 
 Respiratory: Negative for cough, hemoptysis, sputum production, shortness of breath and whe
ezing.  
 Cardiovascular: Positive for palpitations and leg swelling. Negative for orthopnea, claudic
ation and PND. 
 Gastrointestinal: Negative for heartburn, nausea, vomiting, blood in stool and melena. 
 Genitourinary: Negative for dysuria, urgency, frequency, hematuria and flank pain. 
 Musculoskeletal: Negative for myalgias, back pain and neck pain. 
 Skin: Negative for itching and rash. 
 Neurological: Positive for dizziness and headaches. Negative for tingling, tremors, sensory
 change, speech change, focal weakness, seizures, loss of consciousness and weakness. 
 Endo/Heme/Allergies: Negative for environmental allergies and polydipsia. Does not bruise/b
leed easily. 
 Psychiatric/Behavioral: Positive for depression. Negative for suicidal ideas, hallucination
s, memory loss and substance abuse. The patient is not nervous/anxious and does not have ins
omnia.  
 
 Reviewed 10 systems, all were negative except as listed in HPI
 
 Physical Exam:
 /64 mmHg | Pulse 67 | Temp(Src) 36.2 C (97.2 F) | Resp 15 | Ht 1.6 m (5' 3") | Wt
 74.3 kg (163 lb 12.8 oz) | BMI 29.02 kg/m2 | SpO2 96%
 Physical Exam 
 Constitutional: She is oriented to person, place, and time. She appears well-developed and 
well-nourished. No distress. 
 HENT: 
 Head: Normocephalic and atraumatic. 
 Eyes: Conjunctivae and EOM are normal. Pupils are equal, round, and reactive to light. No s
cleral icterus. 
 Neck: Normal range of motion. No JVD present. No tracheal deviation present. No thyromegaly
 present. 
 Cardiovascular: Normal rate, regular rhythm, normal heart sounds and intact distal pulses. 
 
 Exam reveals no gallop and no friction rub.  
 No murmur heard.
 Pulmonary/Chest: Effort normal and breath sounds normal. No stridor. No respiratory distres
s. She has no wheezes. She has no rales. She exhibits no tenderness. 
 Abdominal: Soft. Bowel sounds are normal. She exhibits no distension and no mass. There is 
no tenderness. There is no rebound and no guarding. 
 Musculoskeletal: She exhibits no edema or tenderness. 
 Lymphadenopathy: 
   She has no cervical adenopathy. 
 Neurological: She is alert and oriented to person, place, and time. No cranial nerve defici
t. 
 Skin: Skin is warm and dry. She is not diaphoretic. 
 Psychiatric: She has a normal mood and affect. Her behavior is normal. 
 
 Test Results:
 ECG reviewed by me from ER and here Incomplete RBBB otherwise normal
 
 Recent Results (from the past 24 hour(s)) 
 Troponin I 
 Result Value Ref Range 
  Troponin I 2.01 (HH) <0.06 ng/mL 
 CBC no Differential 
 Result Value Ref Range 
  WBC 4.2 4.0-11.0 K/uL 
  RBC 4.33 3.70-5.20 M/uL 
  Hgb 13.9 11.5-16.0 g/dL 
  Hct 41.2 34.0-47.0 % 
  MCV 95.0 83.0-101.0 fL 
  MCH 32.2 28.0-35.0 pg 
  MCHC 33.9 32.0-36.0 g/dL 
  RDW-CV 12.8 <15.0 % 
  Platelet Count 129 (L) 140-440 K/uL 
  MPV 10.1 fL 
 Basic Metabolic Panel 
 Result Value Ref Range 
   136-149 mmol/L 
  K 3.7 3.5-5.1 mmol/L 
    mmol/L 
  CO2 30 24-31 mmol/L 
  ANION GAP 6 3-16 mmol/L 
  GLUCOSE 85  mg/dL 
  BUN 13 7-18 mg/dL 
  Creatinine, Serum/Plasma 0.54 (L) 0.60-1.30 mg/dL 
  eGFR if not AFR[place] AMERICAN >60 >=60 mL/min/1.73m2 
  CALCIUM 9.1 8.3-10.5 mg/dL 
  BUN/CREA 24.1  
 PTT 
 Result Value Ref Range 
  PTT 32 22-36 seconds 
 ECG 12 lead 
 Result Value Ref Range 
  VENTRICULAR RATE EKG 57 BPM 
  ATRIAL RATE 57 BPM 
  P-R INTERVAL 146 ms 
  QRS DURATION 78 ms 
  Q-T INTERVAL 450 ms 
  Q-T INTERVAL (CORRECTED) 438 ms 
  P WAVE AXIS 34 degrees 
  QRS AXIS -2 degrees 
  T AXIS 56 degrees 
 
  INTERPRETATION TEXT   
   Sinus bradycardia
 Low voltage QRS
 Nonspecific ST abnormality
 Borderline ECG
 No previous ECGs available
 Confirmed by ANASTASIA BRASHER MD ([id number]) on 2017 1:32:01 PM
  
  
 Impression/Plan:
 
 1. NSTEMI
 2. Pain behind right eye - etiology unknown
 3. Dyslipidemia
 4. Strong + family history of CAD
 Plan:
 CT of head with contrast r/o intracranial pathology
 Then proceed with cath
 As heparin was never started at [facility] I will hold off pending the head CT
 
 Electronically signed by Anastasia Brasher MD at 2017  1:40 PM PSTdocumented in this e
ncounter
 
 Procedure Notes
 Anastasia Brasher MD - 2017  5:10 PM PSTAssociated Order(s): CV CARDIAC PROCEDUREForm
atting of this note might be different from the original.
 Sera Weeks is a 71 y.o. female patient.
 1. Acquired hypothyroidism  
 2. NSTEMI (non-ST elevated myocardial infarction) (HCC)  
 3. Pure hypercholesterolemia  
 
 Past Medical History 
 Diagnosis Date 
   Hyperlipidemia  
   Hypothyroidism  
   Incontinence  
   Depression  
 
 Blood pressure 124/78, pulse 63, temperature 36.2 C (97.2 F), resp. rate 19, height 1.6
 m (5' 3"), weight 74.3 kg (163 lb 12.8 oz), SpO2 90 %.
 
 CV Cardiac Procedure
 Date/Time: 2017 17:10
 Performed by: ANASTASIA BRASHER
 Authorized by: ANASTASIA BRASHER
 Consent: Verbal consent obtained. Written consent obtained.
 Risks and benefits: risks, benefits and alternatives were discussed
 Consent given by: patient
 Patient understanding: patient states understanding of the procedure being performed
 Patient consent: the patient's understanding of the procedure matches consent given
 Procedure consent: procedure consent matches procedure scheduled
 Relevant documents: relevant documents present and verified
 Test results: test results available and properly labeled
 Site marked: the operative site was marked
 Imaging studies: imaging studies available
 Required items: required blood products, implants, devices, and special equipment available
 Patient identity confirmed: verbally with patient and arm band
 Time out: Immediately prior to procedure a "time out" was called to verify the correct maki
ent, procedure, equipment, support staff and site/side marked as required.
 Preparation: Patient was prepped and draped in the usual sterile fashion.
 
 Local anesthesia used: yes
 Local anesthetic: lidocaine 1% without epinephrine
 Patient sedated: yes
 Sedation type: moderate (conscious) sedation
 Sedatives: fentanyl and midazolam
 Vitals: Vital signs were monitored during sedation.
 Patient tolerance: Patient tolerated the procedure well with no immediate complications
 
 [facility]
 
 LEFT CARDIAC CATHETERIZATION REPORT
 
 PATIENT NAME:  Sera Weeks
 YOB: 1945
 MEDICAL RECORD NUMBER:  [id number]
 DATE OF PROCEDURE:   2017
                                                                             
 PRIMARY CARE PROVIDER:  Chandra King MD
 :   Anastasia Brasher MD, [facility], [facility]
 
 PRE-PROCEDURE DIAGNOSIS:   NSTEMI
 POST-PROCEDURE DIAGNOSIS:  same
 
 PROCEDURES PERFORMED:      
 1.  Left Heart Catheterization for pressures
 2.  Coronary Angiography
 3.  Aortic Root 
 
 DESCRIPTION OF PROCEDURE:
 
 Informed consent was obtained from the patient, and a time-out was performed to verify the 
patient's identification and planned procedure.  Please refer to the computer log entry form
 for precise details.  The patient's right radial artery was sterilely prepped.  Arterial ac
cess was achieved with  micropuncture kit.  Patient was then given intravenous heparin as we
ll as intra-arterial nitroglycerin and Nicardipine through the sheath.  A 5 French JR4 and a
 3 DRC did not fit the coronary.  A pullback across the valve was used to assess if there wa
s a gradient across the aortic valve A  JL 3.5 did not fit the left system nor did a LBU 3 d
id not fit.  A JL3 sat close to the anomalous RCA and also opacified the Left.  A pig tail w
as used to perform an aortic root. .  
 The TR band occluder device was utilized to achieve successful hemostasis of the radial art
mik.  There were no immediate complications.  
 
   Estimated blood loss was: 10 cc.
   Fluro Time: 8.9 Min 
   Total Reference Air Kerma: 443.55 mGy
   Contrast: 105 mls of Omnipaque 350
 
 FINDINGS:
 Hemodynamics:
 Left ventricular end-diastolic pressure (LVEDP) was 14 mm Hg.    There was no gradient acro
ss the aortic valve.
 
 Left Ventriculogram:  Not performed 
 Left main coronary artery:  Normal 
 Left anterior descending coronary artery:  Large vessel.  No significant disease.
 Circumflex coronary artery:  Dominant.  Gives off 2 marginal and the PDA.  No significant d
isease.  10% in the AV groove.
 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
usp.  Minimal luminal irregularity.
 
 
 Aortic Root:  
 Enlarged 
 No AI 
 No obvious dissection 
 Possible small aortic ulceration 
 CONCLUSIONS:
 1. Dilated Aortic Root
 2. No AI
 3. Normal Left Main
 4. Normal LAD
 5. Dominant LCX.  10% luminal irregularity in AV groove.  Gives off the PDA
 6. Non dominant RCA.  Mild plaque.  Arises anomalously from the left cusp
 
 CLINICAL IMPRESSION AND RECOMMENDATIONS 
 Will need CT of aorta to rule out dissection
 Echo for LV function
 
 Anastasia Brasher MD, FACC, [facility]
 DATE/TIME:  2017 17:11
 2017 17:11
 
 Portions of this chart were created with Dragon voice recognition software. Occasional wron
g-word or   sound-alike  substitutions may have occurred due to the inherent limitations 
of voice recognition software. Please read the chart carefully and recognize, using context,
 where these substitutions have occurred
 
 Anastasia Brasher
 2017
 Electronically signed by Anastasia Brasher MD at 2017  7:10 PM PSTdocumented in this e
ncounter
 
 Miscellaneous Notes
 Plan of Care - Dayan Daniel RN - 2017  1:59 PM PSTProblem: Patient Care Ove
rview (Adult)
 Goal: Care Team Goals & Evaluation
 PROBLEM-RELATED GOALS:
 **
 
 STRATEGY TO ACHIEVE GOALS:
 **
 
 RESTRAINT-RELATED GOALS:
 
 STRATEGIES TO ACHIEVE RESTRAINT GOALS: 
 Outcome: Improving
 Goal Evaluation:
 Pt denied any chest pain. Ambulated in the hallway independently x 2. No complaint while wa
lking. 
 
 All questions answered. No further questions or concerns raised. Pt ambulated out of the ro
om in a stable condition. Electronically signed by Dayan Daniel RN at 2017  2
:11 PM PSTPlan of Care - Sivan Shin RN - 2017  6:12 AM PSTProblem: Patient 
Care Overview (Adult)
 Goal: Personalization
 Needs & Preferences 
 Outcome: Improving
 Slept well.  Denies chest pain or sob.  VSS.  Independent in room.
 
   Electronically signed by Sivan Shin RN at 2017  6:12 AM PSTPlan of [facility] -
 
 Carmelita Cox RN - 2017  4:10 PM PSTProblem: Discharge Planning
 Goal: Patient will be discharged in a safe manner
 Discharge planning:
 This CM met with patient, Sera Weeks. 
 She lives alone in her home in [place]. 
 She is very independent. She does have a cane, but rately needs to use it. She does not use
 any hoe oxygen, nor CPAP. 
 She does still drives, does her own cooking, Cleaning and is able to care for herself. 
 Her son lives across the street from her and would help with any of her needs if she had an
y. 
 Her PCP is Dr King and she use [facility] in [place]. 
 Her son will be her transportation home when she is medically stable for discharge. 
 Electronically signed by: Carmelita Cox RN 2017 16:10
   
 
   Electronically signed by Carmelita Cox RN at 2017  4:10 PM PSTPlan of [facility] - 2017  8:23 AM PSTProblem: Patient Care Overview (Adult)
 Goal: Care Team Goals & Evaluation
 PROBLEM-RELATED GOALS:
 **
 
 STRATEGY TO ACHIEVE GOALS:
 **
 
 RESTRAINT-RELATED GOALS:
 
 STRATEGIES TO ACHIEVE RESTRAINT GOALS: 
 Spiritual Care
 
 Sera Weeks is a 71 y.o. female who is admitted for Chest pain [R07.9] 
 
 This is my first visit with the patient.   Sera Weeks  was asleep at the time of the visi
t. 
 Her granddaughter was present supporting at her bedside and had been there all night.  
 
 Spiritual Evaluation:
 Patient was asleep so no assessment made.   
 Granddaughter is from [place], showed very positive compassion toward her grandmother.
 
 Spiritual Interventions:
 Engaged in social conversation; active listening and pastoral support was provided.  
 Granddaughter stated no needs, offered Chaplaincy support if any needs arose. 
 
 Spiritual Outcomes:
 Expressed appreciation for the visit. 
 
 Spiritual Goals/Followup:
 Will see the patient as requested. If there are any other spiritual care issues that arise,
 please contact .
 
   
 
   Electronically signed by Chaplain Sharan at 2017  8:23 AM PSTPlan of Sivan David RN - 2017  7:33 AM PSTProblem: Patient Care Overview (Adul
t)
 Goal: Personalization
 Needs & Preferences 
 Outcome: Improving
 Patient slept well overnight.  No c/o chest pain or SOB reported.  R. Radial dressing c/d/i
.  Good pulses.
 
 
   Electronically signed by Sivan Shin RN at 2017  7:33 AM PSTdocumented in 
this encounter
 
 Plan of Treatment
 Not on filedocumented as of this encounter
 
 Procedures
 
 
+---------------------+--------+-------------+----------------------+----------------------+
| Procedure Name      | Priori | Date/Time   | Associated Diagnosis | Comments             |
|                     | ty     |             |                      |                      |
+---------------------+--------+-------------+----------------------+----------------------+
| TROPONIN I          | Routin | 2017  |                      |   Results for this   |
|                     | e      |  3:50 AM    |                      | procedure are in the |
|                     |        | PST         |                      |  results section.    |
+---------------------+--------+-------------+----------------------+----------------------+
| D-DIMER             | Routin | 2017  |                      |   Results for this   |
|                     | e      |  3:49 AM    |                      | procedure are in the |
|                     |        | PST         |                      |  results section.    |
+---------------------+--------+-------------+----------------------+----------------------+
| CBC NO DIFFERENTIAL | Routin | 2017  |                      |   Results for this   |
|                     | e      |  3:49 AM    |                      | procedure are in the |
|                     |        | PST         |                      |  results section.    |
+---------------------+--------+-------------+----------------------+----------------------+
| BASIC METABOLIC     | Routin | 2017  |                      |   Results for this   |
| PANEL               | e      |  3:49 AM    |                      | procedure are in the |
|                     |        | PST         |                      |  results section.    |
+---------------------+--------+-------------+----------------------+----------------------+
| D-DIMER             | Routin | 2017  |                      |   Results for this   |
|                     | e      | 12:33 PM    |                      | procedure are in the |
|                     |        | PST         |                      |  results section.    |
+---------------------+--------+-------------+----------------------+----------------------+
| ECHO COMPLETE       | Routin | 2017  |                      |   Results for this   |
|                     | e      | 11:00 AM    |                      | procedure are in the |
|                     |        | PST         |                      |  results section.    |
+---------------------+--------+-------------+----------------------+----------------------+
| ECG 12 LEAD         | Routin | 2017  |                      |   Results for this   |
|                     | e      |  6:03 AM    |                      | procedure are in the |
|                     |        | PST         |                      |  results section.    |
+---------------------+--------+-------------+----------------------+----------------------+
| LIPID PANEL         | Add-On | 2017  |                      |   Results for this   |
|                     |        |  4:47 AM    |                      | procedure are in the |
|                     |        | PST         |                      |  results section.    |
+---------------------+--------+-------------+----------------------+----------------------+
| EXTRA LAVENDER TOP  | Routin | 2017  |                      |   Results for this   |
| TUBE                | e      |  4:47 AM    |                      | procedure are in the |
|                     |        | PST         |                      |  results section.    |
+---------------------+--------+-------------+----------------------+----------------------+
| COMPREHENSIVE       | Routin | 2017  |                      |   Results for this   |
| METABOLIC PANEL     | e      |  4:47 AM    |                      | procedure are in the |
|                     |        | PST         |                      |  results section.    |
+---------------------+--------+-------------+----------------------+----------------------+
| TROPONIN I          | Routin | 2017  |                      |   Results for this   |
|                     | e      |  8:33 PM    |                      | procedure are in the |
|                     |        | PST         |                      |  results section.    |
+---------------------+--------+-------------+----------------------+----------------------+
| CT ANGIOGRAM CHEST  | Routin | 2017  |                      |   Results for this   |
| ABDOMEN W CONTRAST  | e      |  5:05 PM    |                      | procedure are in the |
 
|                     |        | PST         |                      |  results section.    |
+---------------------+--------+-------------+----------------------+----------------------+
| CV COR ANGIO        | Routin | 2017  |                      |   Results for this   |
|                     | e      |  4:26 PM    |                      | procedure are in the |
|                     |        | PST         |                      |  results section.    |
+---------------------+--------+-------------+----------------------+----------------------+
| CT HEAD W WO        | STAT   | 2017  |                      |   Results for this   |
| CONTRAST            |        |  2:23 PM    |                      | procedure are in the |
|                     |        | PST         |                      |  results section.    |
+---------------------+--------+-------------+----------------------+----------------------+
| ECG 12 LEAD         | STAT   | 2017  |                      |   Results for this   |
|                     |        | 12:50 PM    |                      | procedure are in the |
|                     |        | PST         |                      |  results section.    |
+---------------------+--------+-------------+----------------------+----------------------+
| TROPONIN I          | Routin | 2017  |                      |   Results for this   |
|                     | e      | 12:44 PM    |                      | procedure are in the |
|                     |        | PST         |                      |  results section.    |
+---------------------+--------+-------------+----------------------+----------------------+
| PTT                 | Routin | 2017  |                      |   Results for this   |
|                     | e      | 12:44 PM    |                      | procedure are in the |
|                     |        | PST         |                      |  results section.    |
+---------------------+--------+-------------+----------------------+----------------------+
| CBC NO DIFFERENTIAL | Routin | 2017  |                      |   Results for this   |
|                     | e      | 12:44 PM    |                      | procedure are in the |
|                     |        | PST         |                      |  results section.    |
+---------------------+--------+-------------+----------------------+----------------------+
| BASIC METABOLIC     | STAT   | 2017  |                      |   Results for this   |
| PANEL               |        | 12:44 PM    |                      | procedure are in the |
|                     |        | PST         |                      |  results section.    |
+---------------------+--------+-------------+----------------------+----------------------+
| XR CHEST PA OR AP   | Routin | 2017  |                      |   Results for this   |
|                     | e      |  4:55 AM    |                      | procedure are in the |
|                     |        | PST         |                      |  results section.    |
+---------------------+--------+-------------+----------------------+----------------------+
| ECG - EXTERNAL SCAN |        | 2017  |                      |   Results for this   |
|                     |        | 12:00 AM    |                      | procedure are in the |
|                     |        | PST         |                      |  results section.    |
+---------------------+--------+-------------+----------------------+----------------------+
 documented in this encounter
 
 Results
 Troponin I (2017  3:50 AM PST)
 
+------------+--------------------------+-------------+-------------+--------------+
| Component  | Value                    | Ref Range   | Performed   | Pathologist  |
|            |                          |             | At          | Signature    |
+------------+--------------------------+-------------+-------------+--------------+
| Troponin I | 1.28 ()Comment:        | <0.06 ng/mL | [facility]  |              |
|            | Consistent with previous |             | [facility]    |              |
|            |  results. Reference      |             | MEDICAL     |              |
|            | Ranges:0.00-0.06 =       |             | CENTER -    |              |
|            | NORMAL>0.06       =      |             | LABORATORY  |              |
|            | SUSPICIOUS FOR           |             |             |              |
|            | MYOCARDIAL DAMAGE NOTE:  |             |             |              |
|            | Values greater than 0.50 |             |             |              |
|            |  ng/mL have been shown   |             |             |              |
|            | to be strongly           |             |             |              |
|            | associated with acute    |             |             |              |
|            | myocardial infarction.   |             |             |              |
|            | The American College of  |             |             |              |
 
|            | Cardiology (ACC)         |             |             |              |
|            | recommends a decision    |             |             |              |
|            | limit of 0.06 ng/mL for  |             |             |              |
|            | this assay.   Results    |             |             |              |
|            | greater than 0.06 can    |             |             |              |
|            | reflect a pre-infarct    |             |             |              |
|            | acute coronary syndrome, |             |             |              |
|            |  but can also reflect    |             |             |              |
|            | myocardial necrosis or   |             |             |              |
|            | injury that is not due   |             |             |              |
|            | to coronary artery       |             |             |              |
|            | disease.   Some of these |             |             |              |
|            |  causes are sepsis,      |             |             |              |
|            | hypocolemia, atrial      |             |             |              |
|            | fibrillation, heart      |             |             |              |
|            | failure, pulmonary       |             |             |              |
|            | embolism, myocarditis,   |             |             |              |
|            | myocardial contusion,    |             |             |              |
|            | and renal failure.   The |             |             |              |
|            |  diagnosis of myocardial |             |             |              |
|            |  infarction should be    |             |             |              |
|            | based on a combination   |             |             |              |
|            | of the patient's         |             |             |              |
|            | clinical presentation    |             |             |              |
|            | and the clinical         |             |             |              |
|            | laboratory test results  |             |             |              |
|            | (especially serial       |             |             |              |
|            | troponin levels).        |             |             |              |
+------------+--------------------------+-------------+-------------+--------------+
 
 
 
+----------+
| Specimen |
+----------+
| Blood    |
+----------+
 
 
 
+----------------------+--------------------+--------------------+----------------+
| Performing           | Address            | City/State/Zipcode | Phone Number   |
| Organization         |                    |                    |                |
+----------------------+--------------------+--------------------+----------------+
|   [facility]     |   [address] |   [place]  |   [phone] |
| [facility]  |                    | [id number]              |                |
| - LABORATORY         |                    |                    |                |
+----------------------+--------------------+--------------------+----------------+
 CBC no Differential (2017  3:49 AM PST)
 
+-------------+---------+-----------------+-------------+--------------+
| Component   | Value   | Ref Range       | Performed   | Pathologist  |
|             |         |                 | At          | Signature    |
+-------------+---------+-----------------+-------------+--------------+
| White Blood | 4.4     | 4.0 - 11.0 K/uL | [facility]  |              |
|  Cells      |         |                 | [facility]    |              |
|             |         |                 | MEDICAL     |              |
|             |         |                 | CENTER -    |              |
|             |         |                 | LABORATORY  |              |
+-------------+---------+-----------------+-------------+--------------+
 
| Red Blood   | 3.85    | 3.70 - 5.20     | [facility]  |              |
| Cells       |         | M/uL            | [facility]    |              |
|             |         |                 | MEDICAL     |              |
|             |         |                 | CENTER -    |              |
|             |         |                 | LABORATORY  |              |
+-------------+---------+-----------------+-------------+--------------+
| Hemoglobin  | 12.5    | 11.5 - 16.0     | [facility]  |              |
|             |         | g/dL            | [facility]    |              |
|             |         |                 | MEDICAL     |              |
|             |         |                 | CENTER -    |              |
|             |         |                 | LABORATORY  |              |
+-------------+---------+-----------------+-------------+--------------+
| Hematocrit  | 36.4    | 34.0 - 47.0 %   | [facility]  |              |
|             |         |                 | [facility]    |              |
|             |         |                 | MEDICAL     |              |
|             |         |                 | CENTER -    |              |
|             |         |                 | LABORATORY  |              |
+-------------+---------+-----------------+-------------+--------------+
| MCV         | 94.6    | 83.0 - 101.0 fL | [facility]  |              |
|             |         |                 | [facility]    |              |
|             |         |                 | MEDICAL     |              |
|             |         |                 | CENTER -    |              |
|             |         |                 | LABORATORY  |              |
+-------------+---------+-----------------+-------------+--------------+
| MCH         | 32.5    | 28.0 - 35.0 pg  | [facility]  |              |
|             |         |                 | [facility]    |              |
|             |         |                 | MEDICAL     |              |
|             |         |                 | CENTER -    |              |
|             |         |                 | LABORATORY  |              |
+-------------+---------+-----------------+-------------+--------------+
| MCHC        | 34.4    | 32.0 - 36.0     | [facility]  |              |
|             |         | g/dL            | [facility]    |              |
|             |         |                 | MEDICAL     |              |
|             |         |                 | CENTER -    |              |
|             |         |                 | LABORATORY  |              |
+-------------+---------+-----------------+-------------+--------------+
| RDW-CV      | 13.1    | <15.0 %         | [facility]  |              |
|             |         |                 | [facility]    |              |
|             |         |                 | MEDICAL     |              |
|             |         |                 | CENTER -    |              |
|             |         |                 | LABORATORY  |              |
+-------------+---------+-----------------+-------------+--------------+
| Platelet    | 125 (L) | 140 - 440 K/uL  | [facility]  |              |
| Count       |         |                 | [facility]    |              |
|             |         |                 | MEDICAL     |              |
|             |         |                 | CENTER -    |              |
|             |         |                 | LABORATORY  |              |
+-------------+---------+-----------------+-------------+--------------+
| MPV         | 9.6     | fL              | [facility]  |              |
|             |         |                 | [facility]    |              |
|             |         |                 | MEDICAL     |              |
|             |         |                 | CENTER -    |              |
|             |         |                 | LABORATORY  |              |
+-------------+---------+-----------------+-------------+--------------+
 
 
 
+----------+
| Specimen |
+----------+
 
| Blood    |
+----------+
 
 
 
+----------------------+--------------------+--------------------+----------------+
| Performing           | Address            | City/State/Zipcode | Phone Number   |
| Organization         |                    |                    |                |
+----------------------+--------------------+--------------------+----------------+
|   [facility]     |   [address] |   [place]  |   [phone] |
| [facility]  |                    | [address]              |                |
| - LABORATORY         |                    |                    |                |
+----------------------+--------------------+--------------------+----------------+
 D-Dimer (2017  3:49 AM PST)
 
+-------------+--------------------------+--------------+-------------+--------------+
| Component   | Value                    | Ref Range    | Performed   | Pathologist  |
|             |                          |              | At          | Signature    |
+-------------+--------------------------+--------------+-------------+--------------+
| D-Dimer     | 0.49Comment: This        | <=0.50 ug/ml | [facility]  |              |
| Quantitativ | quantitative D-Dimer     |              | [facility]    |              |
| e           | assay has been evaluated |              | MEDICAL     |              |
|             |  for screening for       |              | CENTER -    |              |
|             | venous thrombotic        |              | LABORATORY  |              |
|             | disease, and may be      |              |             |              |
|             | useful in ruling out,    |              |             |              |
|             | but not ruling in        |              |             |              |
|             | disease. Values less     |              |             |              |
|             | than 0.50 ug/mL FEU      |              |             |              |
|             | (Fibrinogen Equivalent   |              |             |              |
|             | Units) have a negative   |              |             |              |
|             | predictive value of      |              |             |              |
|             | approximately 95% for    |              |             |              |
|             | ruling out large         |              |             |              |
|             | pulmonary emboli or      |              |             |              |
|             | proximal deep vein       |              |             |              |
|             | thrombosis. Distal DVT   |              |             |              |
|             | are not excluded. An     |              |             |              |
|             | elevated D-dimer can be  |              |             |              |
|             | present in patients with |              |             |              |
|             |  liver disease,          |              |             |              |
|             | pregnancy, eclampsia,    |              |             |              |
|             | heart disease and some   |              |             |              |
|             | cancers among other      |              |             |              |
|             | conditions. The presence |              |             |              |
|             |  of rheumatoid factor at |              |             |              |
|             |  a level >50 IU/mL may   |              |             |              |
|             | falsely elevate the      |              |             |              |
|             | determined D-dimer       |              |             |              |
|             | levels.                  |              |             |              |
+-------------+--------------------------+--------------+-------------+--------------+
 
 
 
+----------+
| Specimen |
+----------+
| Blood    |
+----------+
 
 
 
 
+----------------------+--------------------+--------------------+----------------+
| Performing           | Address            | City/State/Zipcode | Phone Number   |
| Organization         |                    |                    |                |
+----------------------+--------------------+--------------------+----------------+
|   [facility]     |   [address] |   [place]  |   [phone] |
| [facility]  |                    | [address]              |                |
| - LABORATORY         |                    |                    |                |
+----------------------+--------------------+--------------------+----------------+
 Basic Metabolic Panel (2017  3:49 AM PST)
 
+-------------+--------------------------+-----------------+-------------+--------------+
| Component   | Value                    | Ref Range       | Performed   | Pathologist  |
|             |                          |                 | At          | Signature    |
+-------------+--------------------------+-----------------+-------------+--------------+
| Na          | 141                      | 136 - 149       | [facility]  |              |
|             |                          | mmol/L          | [facility]    |              |
|             |                          |                 | [facility]     |              |
|             |                          |                 | CENTER -    |              |
|             |                          |                 | LABORATORY  |              |
+-------------+--------------------------+-----------------+-------------+--------------+
| K           | 3.8                      | 3.5 - 5.1       | [facility]  |              |
|             |                          | mmol/L          | [facility]    |              |
|             |                          |                 | MEDICAL     |              |
|             |                          |                 | CENTER -    |              |
|             |                          |                 | LABORATORY  |              |
+-------------+--------------------------+-----------------+-------------+--------------+
| Cl          | 105                      | 98 - 109 mmol/L | [facility]  |              |
|             |                          |                 | [facility]    |              |
|             |                          |                 | MEDICAL     |              |
|             |                          |                 | CENTER -    |              |
|             |                          |                 | LABORATORY  |              |
+-------------+--------------------------+-----------------+-------------+--------------+
| CO2         | 29                       | 24 - 31 mmol/L  | [facility]  |              |
|             |                          |                 | [facility]    |              |
|             |                          |                 | MEDICAL     |              |
|             |                          |                 | CENTER -    |              |
|             |                          |                 | LABORATORY  |              |
+-------------+--------------------------+-----------------+-------------+--------------+
| Anion Gap   | 7                        | 3 - 16 mmol/L   | [facility]  |              |
|             |                          |                 | [facility]    |              |
|             |                          |                 | MED[facility]     |              |
|             |                          |                 | CENTER -    |              |
|             |                          |                 | LABORATORY  |              |
+-------------+--------------------------+-----------------+-------------+--------------+
| Glucose     | 95                       | 70 - 109 mg/dL  | [facility]  |              |
|             |                          |                 | [facility]    |              |
|             |                          |                 | [facility]     |              |
|             |                          |                 | CENTER -    |              |
|             |                          |                 | LABORATORY  |              |
+-------------+--------------------------+-----------------+-------------+--------------+
| BUN         | 10                       | 7 - 18 mg/dL    | [facility]  |              |
|             |                          |                 | [facility]    |              |
|             |                          |                 | [facility]     |              |
|             |                          |                 | CENTER -    |              |
|             |                          |                 | LABORATORY  |              |
+-------------+--------------------------+-----------------+-------------+--------------+
| Creatinine  | 0.72                     | 0.60 - 1.30     | [facility]  |              |
|             |                          | mg/dL           | [facility]    |              |
 
|             |                          |                 | MEDICAL     |              |
|             |                          |                 | CENTER -    |              |
|             |                          |                 | LABORATORY  |              |
+-------------+--------------------------+-----------------+-------------+--------------+
| eGFR if not | >60Comment: GLOMERULAR   | >=60            | [facility]  |              |
|      | FILTRATION               | mL/min/1.73m2   | [facility]    |              |
| [employer]    | RATE,ESTIMATED           |                 | MEDICAL     |              |
|             |   mL/min/1.51g2Sbpn than |                 | CENTER -    |              |
|             |  60    Chronic kidney    |                 | LABORATORY  |              |
|             | disease,if found over a  |                 |             |              |
|             | 3-month period.Less than |                 |             |              |
|             |  15    Kidney failureFor |                 |             |              |
|             |                   |                 |             |              |
|             | Americans,multiply the   |                 |             |              |
|             | calculated GFR by 1.21.  |                 |             |              |
|             |                          |                 |             |              |
+-------------+--------------------------+-----------------+-------------+--------------+
| Calcium     | 8.8                      | 8.3 - 10.5      | [facility]  |              |
|             |                          | mg/dL           | [facility]    |              |
|             |                          |                 | MEDICAL     |              |
|             |                          |                 | CENTER -    |              |
|             |                          |                 | LABORATORY  |              |
+-------------+--------------------------+-----------------+-------------+--------------+
| BUN/Creatin | 13.9                     |                 | [facility]  |              |
| ine Ratio   |                          |                 | [facility]    |              |
|             |                          |                 | MEDICAL     |              |
|             |                          |                 | CENTER -    |              |
|             |                          |                 | LABORATORY  |              |
+-------------+--------------------------+-----------------+-------------+--------------+
 
 
 
+----------+
| Specimen |
+----------+
| Blood    |
+----------+
 
 
 
+----------------------+--------------------+--------------------+----------------+
| Performing           | Address            | City/State/Zipcode | Phone Number   |
| Organization         |                    |                    |                |
+----------------------+--------------------+--------------------+----------------+
|   [facility]     |   [address] |   [place]  |   [phone] |
| [facility]  |                    | [address]              |                |
| - LABORATORY         |                    |                    |                |
+----------------------+--------------------+--------------------+----------------+
 D-Dimer (2017 12:33 PM PST)
 
+-------------+--------------------------+--------------+-------------+--------------+
| Component   | Value                    | Ref Range    | Performed   | Pathologist  |
|             |                          |              | At          | Signature    |
+-------------+--------------------------+--------------+-------------+--------------+
| D-Dimer     | 0.61 (H)Comment: This    | <=0.50 ug/ml | [facility]  |              |
| Quantitativ | quantitative D-Dimer     |              | [facility]    |              |
| e           | assay has been evaluated |              | MEDICAL     |              |
|             |  for screening for       |              | CENTER -    |              |
|             | venous thrombotic        |              | LABORATORY  |              |
|             | disease, and may be      |              |             |              |
 
|             | useful in ruling out,    |              |             |              |
|             | but not ruling in        |              |             |              |
|             | disease. Values less     |              |             |              |
|             | than 0.50 ug/mL FEU      |              |             |              |
|             | (Fibrinogen Equivalent   |              |             |              |
|             | Units) have a negative   |              |             |              |
|             | predictive value of      |              |             |              |
|             | approximately 95% for    |              |             |              |
|             | ruling out large         |              |             |              |
|             | pulmonary emboli or      |              |             |              |
|             | proximal deep vein       |              |             |              |
|             | thrombosis. Distal DVT   |              |             |              |
|             | are not excluded. An     |              |             |              |
|             | elevated D-dimer can be  |              |             |              |
|             | present in patients with |              |             |              |
|             |  liver disease,          |              |             |              |
|             | pregnancy, eclampsia,    |              |             |              |
|             | heart disease and some   |              |             |              |
|             | cancers among other      |              |             |              |
|             | conditions. The presence |              |             |              |
|             |  of rheumatoid factor at |              |             |              |
|             |  a level >50 IU/mL may   |              |             |              |
|             | falsely elevate the      |              |             |              |
|             | determined D-dimer       |              |             |              |
|             | levels.                  |              |             |              |
+-------------+--------------------------+--------------+-------------+--------------+
 
 
 
+----------+
| Specimen |
+----------+
| Blood    |
+----------+
 
 
 
+----------------------+--------------------+--------------------+----------------+
| Performing           | Address            | City/State/Zipcode | Phone Number   |
| Organization         |                    |                    |                |
+----------------------+--------------------+--------------------+----------------+
|   [facility]     |   [address] |   [place]  |   [phone] |
| [facility]  |                    | [address]              |                |
| - LABORATORY         |                    |                    |                |
+----------------------+--------------------+--------------------+----------------+
 ECHO Complete (2017 11:00 AM PST)
 
+-------------+-------+-----------+------------+--------------+
| Component   | Value | Ref Range | Performed  | Pathologist  |
|             |       |           | At         | Signature    |
+-------------+-------+-----------+------------+--------------+
| LVEF-TTE    | 62    |           |            |              |
| [facility] |       |           |            |              |
| IC ECHO     |       |           |            |              |
+-------------+-------+-----------+------------+--------------+
 
 
 
+----------+
| Specimen |
 
+----------+
|          |
+----------+
 
 
 
+----------------------------------------------------------------------------------------+--
------------+
| Narrative                                                                              | P
erformed At |
+----------------------------------------------------------------------------------------+--
------------+
|   This result has an attachment that is not available.  Transthoracic                  |  
            |
| Echocardiography Report (TTE)  Demographics  Patient Name      YOU                   |  
            |
| SERA Room Number                   453  Patient Number   [id number]                 |  
            |
|    Date of Study                2017  Visit Number                               |  
            |
|   [id number]  Accession          [id number]    Referring Physician                    |  
            |
|     ANASTASIA BRASHER MD Number  Date of Birth    1945                              |  
            |
| Sonographer                   [facility]  Age                               |  
            |
|       71 year(s)    Interpreting                  ANASTASIA BRASHER MD                    |  
            |
|                                           Cardiologist  Gender                         |  
            |
|         Female          Nurse Procedure Type of Study  TTE procedure:                  |  
            |
| ECHO Complete. Procedure dateDate: 2017Start: 10:22 AM Technical                 |  
            |
|  Quality: Adequate visualizationStudy Location: ICU Patient Status:                    |  
            |
| RoutineHeight: 62.99 inchesWeight: 163 poundsBSA: 1.77 m^2BMI: 28.88                   |  
            |
| kg/m^2HR: 54 bpm ConclusionsSummaryNormal Left Ventricular                             |  
            |
| contractility was noted.Left ventricle is normal in size and function.                 |  
            |
|  Ejection fraction isestimated at 62%.Trace AIDilated left                             |  
            |
| atrium.aortic root at the upper limits of normal in sizenormal IVC                     |  
            |
| Signature-------------------------------------------------------------                 |  
            |
| --------------- Electronically signed by ANASTASIA BRASHER                                |  
            |
| MD(Interpreting physician) on 2017 01:08                                         |  
            |
| PM--------------------------------------------------------------------                 |  
            |
| -------- FindingsMitral ValveStructurally normal mitral valve without                  |  
            |
| significant stenosis orregurgitation.Aortic ValveTrace AITricuspid                     |  
            |
| ValveTrace TRnot adequate to calculate PA pressurePulmonic ValveThe                    |  
            |
 
| pulmonic valve was not well visualized.Left AtriumDilated left                         |  
            |
| atrium.Left VentricleLeft ventricle is normal in size and function.                    |  
            |
| Ejection fraction isestimated at 62%.Right AtriumNormal right                          |  
            |
| atrium.Right VentricleNormal right ventricular structure and                           |  
            |
| function.Pericardial EffusionNo evidence of pericardial effusion.                      |  
            |
| Miscellaneousaortic root at the upper limits of normal in sizenormal                   |  
            |
| IVC Valves  Mitral Valve  Peak E-Wave: 0.84 m/s Peak A-Wave: 0.45 m/s                  |  
            |
|  Tissue Doppler  Septal e' Velocity: 0.08 m/s Septal E/e' Ratio:10.49                  |  
            |
|  Aortic Valve        Area (continuity): 0.98 cm^2       Mean Gradient:                 |  
            |
|  3.31 mmHg  LVOT  Peak Velocity: 0.81 m/s LVOT Diameter: 1.25 cm                       |  
            |
| Structures  Left Atrium  LA A/P Dimension: 4.87 cm                                     |  
            |
|               LA Area: 19.01 cm^2 LA Vol/BSA Index: 30 mL/m^2                          |  
            |
|                      LA Volume: 53.51 ml                                               |  
            |
|                                           EF Horbwwath83%  Left                        |  
            |
| Ventricle  Diastolic Dimension: 4.81 cm             Systolic                           |  
            |
| Dimension: 2.84 cm Septum Diastolic: 0.91 cm PW Diastolic: 0.91 cm EF                  |  
            |
| Calculated: 62%  Miscellaneous  Aorta  Aortic Root: 3.56 cm Ascending                  |  
            |
| Aorta: 2.3 cm                                                                          |  
            |
|aortic root at the upper limits of normal in size                                       |  
            |
|normal IVC                                                                              |  
            |
|                                                                                        |  
            |
|Signature                                                                              |   
           |
|----------------------------------------------------------------------------             | 
             |
| Electronically signed by VINCE HERMOSILLOInterpreting physician) on                  |  
            |
| 2017 01:08 PM                                                                    |  
            |
|----------------------------------------------------------------------------             | 
             |
|                                                                                        |  
            |
|Findings                                                                               |   
           |
|Mitral Valve                                                                            |  
            |
|Structurally normal mitral valve without significant stenosis or                        |  
            |
 
|regurgitation.                                                                         |   
           |
|Aortic Valve                                                                            |  
            |
|Trace AI                                                                                |  
            |
|Tricuspid Valve                                                                         |  
            |
|Trace TR                                                                                |  
            |
|not adequate to calculate PA pressure                                                   |  
            |
|Pulmonic Valve                                                                          |  
            |
|The pulmonic valve was not well visualized.                                             |  
            |
|Left Atrium                                                                             |  
            |
|Dilated left atrium.                                                                    |  
            |
|Left Ventricle                                                                          |  
            |
|Left ventricle is normal in size and function. Ejection fraction is                     |  
            |
|estimated at 62%.                                                                       |  
            |
|Right Atrium                                                                            |  
            |
|Normal right atrium.                                                                    |  
            |
|Right Ventricle                                                                         |  
            |
|Normal right ventricular structure and function.                                        |  
            |
|Pericardial Effusion                                                                    |  
            |
|No evidence of pericardial effusion.                                                    |  
            |
|                                                                                        |  
            |
|Miscellaneous                                                                          |   
           |
|aortic root at the upper limits of normal in size                                       |  
            |
|normal IVC                                                                              |  
            |
|                                                                                        |  
            |
|Valves                                                                                 |   
           |
|                                                                                        |  
            |
| Mitral Valve                                                                           |  
            |
|                                                                                        |  
            |
| Peak E-Wave: 0.84 m/s                                                                  |  
            |
| Peak A-Wave: 0.45 m/s                                                                  |  
            |
 
|                                                                                        |  
            |
| Tissue Doppler                                                                         |  
            |
|                                                                                        |  
            |
| Septal e' Velocity: 0.08 m/s                                                           |  
            |
| Septal E/e' Ratio:10.49                                                                |  
            |
|                                                                                        |  
            |
| Aortic Valve                                                                           |  
            |
|                                                                                        |  
            |
|       Area (continuity): 0.98 cm^2                                                     |  
            |
|       Mean Gradient: 3.31 mmHg                                                         |  
            |
|                                                                                        |  
            |
| LVOT                                                                                   |  
            |
|                                                                                        |  
            |
| Peak Velocity: 0.81 m/s                                                                |  
            |
| LVOT Diameter: 1.25 cm                                                                 |  
            |
|                                                                                        |  
            |
|Structures                                                                             |   
           |
|                                                                                        |  
            |
| Left Atrium                                                                            |  
            |
|                                                                                        |  
            |
| LA A/P Dimension: 4.87 cm                                 LA Area: 19.01 cm^2          |  
            |
| LA Vol/BSA Index: 30 mL/m^2                              LA Volume: 53.51 ml           |  
            |
|                                                                        EF Sessdvdmm54% |  
            |
|                                                                                        |  
            |
| Left Ventricle                                                                         |  
            |
|                                                                                        |  
            |
| Diastolic Dimension: 4.81 cm             Systolic Dimension: 2.84 cm                   |  
            |
| Septum Diastolic: 0.91 cm                                                              |  
            |
| PW Diastolic: 0.91 cm                                                                  |  
            |
| EF Calculated: 62%                                                                     |  
            |
 
|                                                                                        |  
            |
| Miscellaneous                                                                          |  
            |
|                                                                                        |  
            |
| Aorta                                                                                  |  
            |
|                                                                                        |  
            |
| Aortic Root: 3.56 cm                                                                   |  
            |
| Ascending Aorta: 2.3 cm                                                                |  
            |
|                                                                                        |  
            |
+----------------------------------------------------------------------------------------+--
------------+
 
 
 
+------------------------------------------------------------------------------------------+
| Procedure Note                                                                           |
+------------------------------------------------------------------------------------------+
|   Ace, Rad Results In - 2017  1:08 PM [facility]  Transthoracic Echocardiography Report   |
| (TTE) Demographics Patient Name    YOU LOERA Room Number             453 Patient      |
| Number  [id number]  Date of Study           2017 Visit Number    [id number]      |
| Accession       [id number]   Referring Physician     ANASTASIA BRASHER MD Number Date of    |
| Birth   1945   Sonographer             CAROL ANGELO,  Age             71    |
| year(s)   Interpreting            ANASTASIA BRASHER MD                                      |
| Cardiologist Gender          Female       NurseProcedureType of Study TTE procedure:     |
| ECHO Complete.Procedure dateDate: 2017Start: 10:22 AMTechnical Quality: Adequate   |
| visualizationStudy Location: ICUPatient Status: RoutineHeight: 62.99 inchesWeight: 163   |
| poundsBSA: 1.77 m^2BMI: 28.88 kg/m^2HR: 54 bpmConclusionsSummaryNormal Left Ventricular  |
| contractility was noted.Left ventricle is normal in size and function. Ejection fraction |
|  isestimated at 62%.Trace AIDilated left atrium.aortic root at the upper limits of       |
| normal in sizenormal                                                                     |
| IVCSignature---------------------------------------------------------------------------- |
|  Electronically signed by ANASTASIA BRASHER MD(Interpreting physician) on 2017 01:08  |
| PM----------------------------------------------------------------------------FindingsMi |
| tral ValveStructurally normal mitral valve without significant stenosis                  |
| orregurgitation.Aortic ValveTrace AITricuspid ValveTrace TRnot adequate to calculate PA  |
| pressurePulmonic ValveThe pulmonic valve was not well visualized.Left AtriumDilated left |
|  atrium.Left VentricleLeft ventricle is normal in size and function. Ejection fraction   |
| isestimated at 62%.Right AtriumNormal right atrium.Right VentricleNormal right           |
| ventricular structure and function.Pericardial EffusionNo evidence of pericardial        |
| effusion.Miscellaneousaortic root at the upper limits of normal in sizenormal IVCValves  |
| Mitral Valve Peak E-Wave: 0.84 m/s Peak A-Wave: 0.45 m/s Tissue Doppler Septal e'        |
| Velocity: 0.08 m/s Septal E/e' Ratio:10.49 Aortic Valve     Area (continuity): 0.98 cm^2 |
|      Mean Gradient: 3.31 mmHg LVOT Peak Velocity: 0.81 m/s LVOT Diameter: 1.25           |
| cmStructures Left Atrium LA A/P Dimension: 4.87 cm                      LA Area: 19.01   |
| cm^2 LA Vol/BSA Index: 30 mL/m^2                    LA Volume: 53.51 ml                  |
|                                EF Zrxleenbz44% Left Ventricle Diastolic Dimension: 4.81  |
| cm         Systolic Dimension: 2.84 cm Septum Diastolic: 0.91 cm PW Diastolic: 0.91 cm   |
| EF Calculated: 62% Miscellaneous Aorta Aortic Root: 3.56 cm Ascending Aorta: 2.3 cm      |
|Patient Status: Routine                                                                   |
|Height: 62.99 inchesWeight: 163 poundsBSA: 1.77 m^2BMI: 28.88 kg/m^2                      |
|HR: 54 bpm                                                                                |
|                                                                                          |
|Conclusions                                                                              |
 
|Summary                                                                                  |
|Normal Left Ventricular contractility was noted.                                          |
|Left ventricle is normal in size and function. Ejection fraction is                       |
|estimated at 62%.                                                                         |
|Trace AI                                                                                  |
|Dilated left atrium.                                                                      |
|aortic root at the upper limits of normal in size                                         |
|normal IVC                                                                                |
|                                                                                          |
|Signature                                                                                |
|----------------------------------------------------------------------------               
|
| Electronically signed by VINCE HERMOSILLOInterpreting physician) on                    |
| 2017 01:08 PM                                                                      |
|----------------------------------------------------------------------------               
|
|                                                                                          |
|Findings                                                                                 |
|Mitral Valve                                                                              |
|Structurally normal mitral valve without significant stenosis or                          |
|regurgitation.                                                                           |
|Aortic Valve                                                                              |
|Trace AI                                                                                  |
|Tricuspid Valve                                                                           |
|Trace TR                                                                                  |
|not adequate to calculate PA pressure                                                     |
|Pulmonic Valve                                                                            |
|The pulmonic valve was not well visualized.                                               |
|Left Atrium                                                                               |
|Dilated left atrium.                                                                      |
|Left Ventricle                                                                            |
|Left ventricle is normal in size and function. Ejection fraction is                       |
|estimated at 62%.                                                                         |
|Right Atrium                                                                              |
|Normal right atrium.                                                                      |
|Right Ventricle                                                                           |
|Normal right ventricular structure and function.                                          |
|Pericardial Effusion                                                                      |
|No evidence of pericardial effusion.                                                      |
|                                                                                          |
|Miscellaneous                                                                            |
|aortic root at the upper limits of normal in size                                         |
|normal IVC                                                                                |
|                                                                                          |
|Valves                                                                                   |
|                                                                                          |
| Mitral Valve                                                                             |
|                                                                                          |
| Peak E-Wave: 0.84 m/s                                                                    |
| Peak A-Wave: 0.45 m/s                                                                    |
|                                                                                          |
| Tissue Doppler                                                                           |
|                                                                                          |
| Septal e' Velocity: 0.08 m/s                                                             |
| Septal E/e' Ratio:10.49                                                                  |
|                                                                                          |
| Aortic Valve                                                                             |
|                                                                                          |
|     Area (continuity): 0.98 cm^2                                                         |
|     Mean Gradient: 3.31 mmHg                                                             |
 
|                                                                                          |
| LVOT                                                                                     |
|                                                                                          |
| Peak Velocity: 0.81 m/s                                                                  |
| LVOT Diameter: 1.25 cm                                                                   |
|                                                                                          |
|Structures                                                                               |
|                                                                                          |
| Left Atrium                                                                              |
|                                                                                          |
| LA A/P Dimension: 4.87 cm                      LA Area: 19.01 cm^2                       |
| LA Vol/BSA Index: 30 mL/m^2                    LA Volume: 53.51 ml                       |
|                                                EF Yjwmstlsr83%                           |
|                                                                                          |
| Left Ventricle                                                                           |
|                                                                                          |
| Diastolic Dimension: 4.81 cm         Systolic Dimension: 2.84 cm                         |
| Septum Diastolic: 0.91 cm                                                                |
| PW Diastolic: 0.91 cm                                                                    |
| EF Calculated: 62%                                                                       |
|                                                                                          |
| Miscellaneous                                                                            |
|                                                                                          |
| Aorta                                                                                    |
|                                                                                          |
| Aortic Root: 3.56 cm                                                                     |
| Ascending Aorta: 2.3 cm                                                                  |
+------------------------------------------------------------------------------------------+
 ECG 12 lead (2017  6:03 AM PST)
 
+-------------+--------------------------+-----------+------------+--------------+
| Component   | Value                    | Ref Range | Performed  | Pathologist  |
|             |                          |           | At         | Signature    |
+-------------+--------------------------+-----------+------------+--------------+
| VENTRICULAR | 56                       | BPM       | [facility]  |              |
|  RATE EKG   |                          |           |            |              |
+-------------+--------------------------+-----------+------------+--------------+
| ATRIAL RATE | 56                       | BPM       | [facility]  |              |
+-------------+--------------------------+-----------+------------+--------------+
| P-R         | 148                      | ms        | [facility]  |              |
| INTERVAL    |                          |           |            |              |
+-------------+--------------------------+-----------+------------+--------------+
| QRS         | 80                       | ms        | [facility]  |              |
| DURATION    |                          |           |            |              |
+-------------+--------------------------+-----------+------------+--------------+
| Q-T         | 470                      | ms        | [facility]  |              |
| INTERVAL    |                          |           |            |              |
+-------------+--------------------------+-----------+------------+--------------+
| Q-T         | 453                      | ms        | [facility]  |              |
| INTERVAL    |                          |           |            |              |
| (CORRECTED) |                          |           |            |              |
+-------------+--------------------------+-----------+------------+--------------+
| P WAVE AXIS | 19                       | degrees   | [facility]  |              |
+-------------+--------------------------+-----------+------------+--------------+
| QRS AXIS    | 4                        | degrees   | [facility]  |              |
+-------------+--------------------------+-----------+------------+--------------+
| T AXIS      | 49                       | degrees   | [facility]  |              |
+-------------+--------------------------+-----------+------------+--------------+
| INTERPRETAT | Sinus bradycardiaLow     |           | [facility]  |              |
| ION TEXT    | voltage QRSBorderline    |           |            |              |
 
|             | ECGWhen compared with    |           |            |              |
|             | ECG of 2017       |           |            |              |
|             | 12:50,No significant     |           |            |              |
|             | change was               |           |            |              |
|             | foundConfirmed by        |           |            |              |
|             | ANASTASIA BRASHER MD        |           |            |              |
|             | ([id number]) on 2017      |           |            |              |
|             | 9:45:24 AMAlso confirmed |           |            |              |
|             |  by MANJINDER FINE MD      |           |            |              |
|             | ([id number])   on 2017    |           |            |              |
|             | 9:52:00 AM               |           |            |              |
+-------------+--------------------------+-----------+------------+--------------+
 
 
 
+----------+
| Specimen |
+----------+
|          |
+----------+
 
 
 
+----------------------------------------------------------+--------------+
| Narrative                                                | Performed At |
+----------------------------------------------------------+--------------+
|   This result has an attachment that is not available.   |              |
+----------------------------------------------------------+--------------+
 
 
 
+--------------+---------+--------------------+--------------+
| Performing   | Address | City/State/Zipcode | Phone Number |
| Organization |         |                    |              |
+--------------+---------+--------------------+--------------+
|   [facility]  |         |                    |              |
+--------------+---------+--------------------+--------------+
 Lipid Panel (2017  4:47 AM PST)
 
+-------------+-------------------------+-----------------+-------------+--------------+
| Component   | Value                   | Ref Range       | Performed   | Pathologist  |
|             |                         |                 | At          | Signature    |
+-------------+-------------------------+-----------------+-------------+--------------+
| Triglycerid | 122                     | 35 - 160 mg/dL  | [facility]  |              |
| es          |                         |                 | [facility]    |              |
|             |                         |                 | [facility]     |              |
|             |                         |                 | [facility] -    |              |
|             |                         |                 | LABORATORY  |              |
+-------------+-------------------------+-----------------+-------------+--------------+
| Cholesterol | 149 (L)                 | 150 - 200 mg/dL | [facility]  |              |
|             |                         |                 | [facility]    |              |
|             |                         |                 | [facility]     |              |
|             |                         |                 | [facility] -    |              |
|             |                         |                 | LABORATORY  |              |
+-------------+-------------------------+-----------------+-------------+--------------+
| HDL         | 44Comment:     New HDL  | 28 - 83 mg/dL   | [facility]  |              |
|             | Reference Range as of   |                 | [facility]    |              |
|             | September 10, 2015      |                 | MEDICAL     |              |
|             | Values may be 10-20%    |                 | CENTER -    |              |
|             | lower with new,         |                 | LABORATORY  |              |
 
|             | standardized method.    |                 |             |              |
+-------------+-------------------------+-----------------+-------------+--------------+
| Chol/HDL    | 3.4                     |                 | [facility]  |              |
| Ratio       |                         |                 | [facility]    |              |
|             |                         |                 | [facility]     |              |
|             |                         |                 | [facility] -    |              |
|             |                         |                 | LABORATORY  |              |
+-------------+-------------------------+-----------------+-------------+--------------+
| LDL,        | 81                      | <=130 mg/dL     | [facility]  |              |
| Calculated  |                         |                 | [facility]    |              |
|             |                         |                 | [facility]     |              |
|             |                         |                 | C[facility] -    |              |
|             |                         |                 | LABORATORY  |              |
+-------------+-------------------------+-----------------+-------------+--------------+
 
 
 
+----------+
| Specimen |
+----------+
| Blood    |
+----------+
 
 
 
+----------------------+--------------------+--------------------+----------------+
| Performing           | Address            | City/State/Zipcode | Phone Number   |
| Organization         |                    |                    |                |
+----------------------+--------------------+--------------------+----------------+
|   [facility]     |   [address] |   [place], [place]  |   [phone] |
| [facility]  |                    | [id number]              |                |
| - LABORATORY         |                    |                    |                |
+----------------------+--------------------+--------------------+----------------+
 Extra Lavender Top Tube (2017  4:47 AM PST)
 
+-----------+-------+-----------+-------------+--------------+
| Component | Value | Ref Range | Performed   | Pathologist  |
|           |       |           | At          | Signature    |
+-----------+-------+-----------+-------------+--------------+
| Extra     | Done  |           | [facility]  |              |
| Lavender  |       |           | [facility]    |              |
| Top Tube  |       |           | MEDICAL     |              |
|           |       |           | CENTER -    |              |
|           |       |           | LABORATORY  |              |
+-----------+-------+-----------+-------------+--------------+
 
 
 
+----------+
| Specimen |
+----------+
| Blood    |
+----------+
 
 
 
+----------------------+--------------------+--------------------+----------------+
| Performing           | Address            | City/State/Zipcode | Phone Number   |
| Organization         |                    |                    |                |
+----------------------+--------------------+--------------------+----------------+
 
|   [facility]     |   [address] |   [place]  |   [phone] |
| [facility]  |                    | [id number]              |                |
| - LABORATORY         |                    |                    |                |
+----------------------+--------------------+--------------------+----------------+
 Comprehensive Metabolic Panel (2017  4:47 AM PST)
 
+-------------+--------------------------+-----------------+-------------+--------------+
| Component   | Value                    | Ref Range       | Performed   | Pathologist  |
|             |                          |                 | At          | Signature    |
+-------------+--------------------------+-----------------+-------------+--------------+
| Na          | 144                      | 136 - 149       | [facility]  |              |
|             |                          | mmol/L          | [facility]    |              |
|             |                          |                 | MEDICAL     |              |
|             |                          |                 | CENTER -    |              |
|             |                          |                 | LABORATORY  |              |
+-------------+--------------------------+-----------------+-------------+--------------+
| K           | 3.4 (L)                  | 3.5 - 5.1       | [facility]  |              |
|             |                          | mmol/L          | [facility]    |              |
|             |                          |                 | [facility]     |              |
|             |                          |                 | CENTER -    |              |
|             |                          |                 | LABORATORY  |              |
+-------------+--------------------------+-----------------+-------------+--------------+
| Cl          | 111 (H)                  | 98 - 109 mmol/L | [facility]  |              |
|             |                          |                 | [facility]    |              |
|             |                          |                 | MED[facility]     |              |
|             |                          |                 | CENTER -    |              |
|             |                          |                 | LABORATORY  |              |
+-------------+--------------------------+-----------------+-------------+--------------+
| CO2         | 25                       | 24 - 31 mmol/L  | [facility]  |              |
|             |                          |                 | [facility]    |              |
|             |                          |                 | MEDICAL     |              |
|             |                          |                 | CENTER -    |              |
|             |                          |                 | LABORATORY  |              |
+-------------+--------------------------+-----------------+-------------+--------------+
| Anion Gap   | 8                        | 3 - 16 mmol/L   | [facility]  |              |
|             |                          |                 | [facility]    |              |
|             |                          |                 | MEDICAL     |              |
|             |                          |                 | CENTER -    |              |
|             |                          |                 | LABORATORY  |              |
+-------------+--------------------------+-----------------+-------------+--------------+
| Glucose     | 82                       | 70 - 109 mg/dL  | [facility]  |              |
|             |                          |                 | [facility]    |              |
|             |                          |                 | MEDICAL     |              |
|             |                          |                 | CENTER -    |              |
|             |                          |                 | LABORATORY  |              |
+-------------+--------------------------+-----------------+-------------+--------------+
| BUN         | 8                        | 7 - 18 mg/dL    | [facility]  |              |
|             |                          |                 | [facility]    |              |
|             |                          |                 | MEDICAL     |              |
|             |                          |                 | CENTER -    |              |
|             |                          |                 | LABORATORY  |              |
+-------------+--------------------------+-----------------+-------------+--------------+
| Creatinine  | 0.62                     | 0.60 - 1.30     | [facility]  |              |
|             |                          | mg/dL           | [facility]    |              |
|             |                          |                 | MEDICAL     |              |
|             |                          |                 | CENTER -    |              |
|             |                          |                 | LABORATORY  |              |
+-------------+--------------------------+-----------------+-------------+--------------+
| eGFR if not | >60Comment: GLOMERULAR   | >=60            | [facility]  |              |
|      | FILTRATION               | mL/min/1.73m2   | [facility]    |              |
 
| [employer]    | RATE,ESTIMATED           |                 | MEDICAL     |              |
|             |   mL/min/1.52j4Ladv than |                 | CENTER -    |              |
|             |  60    Chronic kidney    |                 | LABORATORY  |              |
|             | disease,if found over a  |                 |             |              |
|             | 3-month period.Less than |                 |             |              |
|             |  15    Kidney failureFor |                 |             |              |
|             |                   |                 |             |              |
|             | Americans,multiply the   |                 |             |              |
|             | calculated GFR by 1.21.  |                 |             |              |
|             |                          |                 |             |              |
+-------------+--------------------------+-----------------+-------------+--------------+
| Calcium     | 8.4                      | 8.3 - 10.5      | [facility]  |              |
|             |                          | mg/dL           | [facility]    |              |
|             |                          |                 | MEDICAL     |              |
|             |                          |                 | CENTER -    |              |
|             |                          |                 | LABORATORY  |              |
+-------------+--------------------------+-----------------+-------------+--------------+
| Albumin     | 3.4                      | 3.2 - 5.0 g/dL  | [facility]  |              |
|             |                          |                 | [facility]    |              |
|             |                          |                 | MEDICAL     |              |
|             |                          |                 | CENTER -    |              |
|             |                          |                 | LABORATORY  |              |
+-------------+--------------------------+-----------------+-------------+--------------+
| Bilirubin   | 0.6                      | 0.1 - 1.5 mg/dL | [facility]  |              |
| Total       |                          |                 | [facility]    |              |
|             |                          |                 | MEDICAL     |              |
|             |                          |                 | CENTER -    |              |
|             |                          |                 | LABORATORY  |              |
+-------------+--------------------------+-----------------+-------------+--------------+
| Total       | 5.6 (L)                  | 6.0 - 7.8 g/dL  | [facility]  |              |
| Protein     |                          |                 | [facility]    |              |
|             |                          |                 | MEDICAL     |              |
|             |                          |                 | CENTER -    |              |
|             |                          |                 | LABORATORY  |              |
+-------------+--------------------------+-----------------+-------------+--------------+
| AST         | 30                       | 10 - 42 U/L     | [facility]  |              |
|             |                          |                 | [facility]    |              |
|             |                          |                 | MEDICAL     |              |
|             |                          |                 | CENTER -    |              |
|             |                          |                 | LABORATORY  |              |
+-------------+--------------------------+-----------------+-------------+--------------+
| ALT         | 16                       | 6 - 45 U/L      | [facility]  |              |
|             |                          |                 | [facility]    |              |
|             |                          |                 | MEDICAL     |              |
|             |                          |                 | CENTER -    |              |
|             |                          |                 | LABORATORY  |              |
+-------------+--------------------------+-----------------+-------------+--------------+
| Alkaline    | 41                       | 40 - 110 U/L    | [facility]  |              |
| Phosphatase |                          |                 | [facility]    |              |
|             |                          |                 | MEDICAL     |              |
|             |                          |                 | CENTER -    |              |
|             |                          |                 | LABORATORY  |              |
+-------------+--------------------------+-----------------+-------------+--------------+
| Globulin    | 2.2                      | 2.1 - 3.8 g/dL  | [facility]  |              |
|             |                          |                 | [facility]    |              |
|             |                          |                 | MEDICAL     |              |
|             |                          |                 | CENTER -    |              |
|             |                          |                 | LABORATORY  |              |
+-------------+--------------------------+-----------------+-------------+--------------+
| Albumin/Carmela | 1.5                      | 0.8 - 2.0       | [facility]  |              |
 
| bulin Ratio |                          |                 | [facility]    |              |
|             |                          |                 | MEDICAL     |              |
|             |                          |                 | CENTER -    |              |
|             |                          |                 | LABORATORY  |              |
+-------------+--------------------------+-----------------+-------------+--------------+
| BUN/Creatin | 12.9                     |                 | [facility]  |              |
| ine Ratio   |                          |                 | [facility]    |              |
|             |                          |                 | MEDICAL     |              |
|             |                          |                 | CENTER -    |              |
|             |                          |                 | LABORATORY  |              |
+-------------+--------------------------+-----------------+-------------+--------------+
 
 
 
+----------+
| Specimen |
+----------+
| Blood    |
+----------+
 
 
 
+----------------------+--------------------+--------------------+----------------+
| Performing           | Address            | City/State/Zipcode | Phone Number   |
| Organization         |                    |                    |                |
+----------------------+--------------------+--------------------+----------------+
|   [facility]     |   [address] |   [place]  |   [phone] |
| [facility]  |                    | [address]              |                |
| - LABORATORY         |                    |                    |                |
+----------------------+--------------------+--------------------+----------------+
 Troponin I (2017  8:33 PM PST)
 
+------------+--------------------------+-------------+-------------+--------------+
| Component  | Value                    | Ref Range   | Performed   | Pathologist  |
|            |                          |             | At          | Signature    |
+------------+--------------------------+-------------+-------------+--------------+
| Troponin I | 2.14 ()Comment:        | <0.06 ng/mL | [facility]  |              |
|            | Critical Result called   |             | [facility]    |              |
|            | to and read back by      |             | MEDICAL     |              |
|            | Sivan Shin RN on  |             | CENTER -    |              |
|            | 2017 at 21:15 by     |             | LABORATORY  |              |
|            | Britney PEREZ Mace.          |             |             |              |
|            | Reference                |             |             |              |
|            | Ranges:0.00-0.06 =       |             |             |              |
|            | NORMAL>0.06       =      |             |             |              |
|            | SUSPICIOUS FOR           |             |             |              |
|            | MYOCARDIAL DAMAGE NOTE:  |             |             |              |
|            | Values greater than 0.50 |             |             |              |
|            |  ng/mL have been shown   |             |             |              |
|            | to be strongly           |             |             |              |
|            | associated with acute    |             |             |              |
|            | myocardial infarction.   |             |             |              |
|            | The [employer] College of  |             |             |              |
|            | Cardiology (ACC)         |             |             |              |
|            | recommends a decision    |             |             |              |
|            | limit of 0.06 ng/mL for  |             |             |              |
|            | this assay.   Results    |             |             |              |
|            | greater than 0.06 can    |             |             |              |
|            | reflect a pre-infarct    |             |             |              |
|            | acute coronary syndrome, |             |             |              |
 
|            |  but can also reflect    |             |             |              |
|            | myocardial necrosis or   |             |             |              |
|            | injury that is not due   |             |             |              |
|            | to coronary artery       |             |             |              |
|            | disease.   Some of these |             |             |              |
|            |  causes are sepsis,      |             |             |              |
|            | hypocolemia, atrial      |             |             |              |
|            | fibrillation, heart      |             |             |              |
|            | failure, pulmonary       |             |             |              |
|            | embolism, myocarditis,   |             |             |              |
|            | myocardial contusion,    |             |             |              |
|            | and renal failure.   The |             |             |              |
|            |  diagnosis of myocardial |             |             |              |
|            |  infarction should be    |             |             |              |
|            | based on a combination   |             |             |              |
|            | of the patient's         |             |             |              |
|            | clinical presentation    |             |             |              |
|            | and the clinical         |             |             |              |
|            | laboratory test results  |             |             |              |
|            | (especially serial       |             |             |              |
|            | troponin levels).        |             |             |              |
+------------+--------------------------+-------------+-------------+--------------+
 
 
 
+----------+
| Specimen |
+----------+
| Blood    |
+----------+
 
 
 
+----------------------+--------------------+--------------------+----------------+
| Performing           | Address            | City/State/Zipcode | Phone Number   |
| Organization         |                    |                    |                |
+----------------------+--------------------+--------------------+----------------+
|   [facility]     |   [address] |   [place]  |   [phone] |
| [facility]  |                    | [address]              |                |
| - LABORATORY         |                    |                    |                |
+----------------------+--------------------+--------------------+----------------+
 CT Angiogram Chest Abdomen (2017  5:05 PM PST)
 
+----------+
| Specimen |
+----------+
|          |
+----------+
 
 
 
+------------------------------------------------------------------------+---------------+
| Narrative                                                              | Performed At  |
+------------------------------------------------------------------------+---------------+
|   CT ANGIOGRAM CHEST ABDOMEN W CONTRAST 2017 5:04 PM     HISTORY:  |   [facility] |
| Rule out dissection.     COMPARISON: None.     PROTOCOL: Axial images  |               |
| of the chest and abdomen were obtained before and after  uneventful    |               |
| administration of 85 mL Omnipaque 350. Coronal and sagittal            |               |
| reformations were acquired.     FINDINGS:  There is moderate           |               |
| atherosclerosis of the aorta extending into the branches. The          |               |
 
| ascending thoracic aorta is mildly ectatic and measures 3.8 cm. There  |               |
| is no  evidence for dissection. The celiac artery, SMA, and TWYLA are    |               |
| patent. The  bilateral renal arteries are normal. There is mild to     |               |
| moderate atherosclerosis  of the iliac arteries.     Neck base is      |               |
| normal. The heart is of normal size. The pulmonary arteries are        |               |
| unremarkable. SVC is normal. No enlarged lymph nodes are seen in the   |               |
| mediastinum, kourtney, or axilla. Trachea and esophagus are normal. There  |               |
| is mild  dependent atelectasis of the bilateral lungs.     The liver   |               |
| demonstrates normal parenchyma. The gallbladder is normal. Biliary     |               |
| ducts are unremarkable. The spleen is unremarkable. The pancreas       |               |
| demonstrates  normal parenchyma and a normal pancreatic duct. Adrenal  |               |
| glands are normal. The  right kidney and visualized ureter are normal. |               |
|  The left kidney and visualized  ureter are normal. Stomach is normal. |               |
|  Imaged small bowel and colon show no acute  findings. There is no     |               |
| significant abnormality in the portal veins, mesenteric  veins, or     |               |
| systemic veins. No enlarged lymph nodes are visualized within the      |               |
| omentum or retroperitoneum. There is no evidence for ascites or free   |               |
| air.     Body wall soft tissue structures are normal. There is mild    |               |
| S-shaped curvature of  the thoracolumbar spine. Mild to moderate       |               |
| spondylosis is present.     IMPRESSION -  No evidence for aortic       |               |
| dissection.     A preliminary report was sent by [email or web address] with  |               |
| no significant  discrepancy.     Dictated and Signed by: Kiana Joseph               |
|  MD    Electronically signed: 2017 8:43 AM                         |               |
+------------------------------------------------------------------------+---------------+
 
 
 
+------------------------------------------------------------------------------------------+
| Procedure Note                                                                           |
+------------------------------------------------------------------------------------------+
|   Ace, Rad Results In - 2017  8:46 AM PST  CT ANGIOGRAM CHEST ABDOMEN W CONTRAST   |
| 2017 5:04 PMHISTORY: Rule out dissection.COMPARISON: None.PROTOCOL: Axial images of  |
| the chest and abdomen were obtained before and afteruneventful administration of 85 mL   |
| Omnipaque 350. Coronal and sagittalreformations were acquired.FINDINGS:There is moderate |
|  atherosclerosis of the aorta extending into the branches. Theascending thoracic aorta   |
| is mildly ectatic and measures 3.8 cm. There is noevidence for dissection. The celiac    |
| artery, SMA, and TWYLA are patent. Thebilateral renal arteries are normal. There is mild   |
| to moderate atherosclerosisof the iliac arteries.Neck base is normal. The heart is of    |
| normal size. The pulmonary arteries areunremarkable. SVC is normal. No enlarged lymph    |
| nodes are seen in themediastinum, kourtney, or axilla. Trachea and esophagus are normal.     |
| There is milddependent atelectasis of the bilateral lungs.The liver demonstrates normal  |
| parenchyma. The gallbladder is normal. Biliaryducts are unremarkable. The spleen is      |
| unremarkable. The pancreas demonstratesnormal parenchyma and a normal pancreatic duct.   |
| Adrenal glands are normal. Theright kidney and visualized ureter are normal. The left    |
| kidney and visualizedureter are normal. Stomach is normal. Imaged small bowel and colon  |
| show no acutefindings. There is no significant abnormality in the portal veins,          |
| mesentericveins, or systemic veins. No enlarged lymph nodes are visualized within        |
| theomentum or retroperitoneum. There is no evidence for ascites or free air.Body wall    |
| soft tissue structures are normal. There is mild S-shaped curvature ofthe thoracolumbar  |
| spine. Mild to moderate spondylosis is present.IMPRESSION -No evidence for aortic        |
| dissection.A preliminary report was sent by [email or web address] with no                      |
| significantdiscrepancy.Dictated and Signed by: Tony Jacobs MD  Electronically signed:   |
| 2017 8:43 AM                                                                         |
|ducts are unremarkable. The spleen is unremarkable. The pancreas demonstrates             |
|normal parenchyma and a normal pancreatic duct. Adrenal glands are normal. The            |
|right kidney and visualized ureter are normal. The left kidney and visualized             |
|ureter are normal. Stomach is normal. Imaged small bowel and colon show no acute          |
|findings. There is no significant abnormality in the portal veins, mesenteric            |
|veins, or systemic veins. No enlarged lymph nodes are visualized within the               |
|omentum or retroperitoneum. There is no evidence for ascites or free air.                 |
 
|                                                                                          |
|Body wall soft tissue structures are normal. There is mild S-shaped curvature of          |
|the thoracolumbar spine. Mild to moderate spondylosis is present.                         |
|                                                                                          |
|IMPRESSION -                                                                              |
|No evidence for aortic dissection.                                                        |
|                                                                                          |
|A preliminary report was sent by [facility] with no significant                      |
|discrepancy.                                                                             |
|                                                                                          |
|Dictated and Signed by: Tony Jacobs MD                                                   |
| Electronically signed: 2017 8:43 AM                                                  |
+------------------------------------------------------------------------------------------+
 
 
 
+---------------+---------+--------------------+--------------+
| Performing    | Address | City/State/Zipcode | Phone Number |
| Organization  |         |                    |              |
+---------------+---------+--------------------+--------------+
|   [facility] |         |                    |              |
+---------------+---------+--------------------+--------------+
 CV CARDIAC PROCEDURE (2017  4:26 PM PST)
 
+----------+
| Specimen |
+----------+
|          |
+----------+
 
 
 
+------------------------------------------------------------------------+--------------+
| Narrative                                                              | Performed At |
+------------------------------------------------------------------------+--------------+
|   This result has an attachment that is not available.  Anastasia CASTILLO         |              |
| MD Suki       2017 19:10Sera Weeks is a 71 y.o. female      |              |
| patient.1. Acquired hypothyroidism   2. NSTEMI (non-ST elevated        |              |
| myocardial infarction) (HCC)   3. Pure hypercholesterolemia    Past    |              |
| Medical History Diagnosis Date                                         |              |
|   Hyperlipidemia                                                       |              |
|   Hypothyroidism                                                       |              |
|   Incontinence                                                         |              |
|   Depression    Blood pressure 124/78, pulse 63, temperature 36.2   C  |              |
| (97.2   F), resp. rate 19, height 1.6 m (5' 3"), weight 74.3 kg (163   |              |
| lb 12.8 oz), SpO2 90 %. CV Cardiac ProcedureDate/Time: 2017        |              |
| 17:10Performed by: ANASTASIA BRASHER AAuthorized by: ANASTASIA BRASHER       |              |
| AConsent: Verbal consent obtained. Written consent obtained.Risks and  |              |
| benefits: risks, benefits and alternatives were discussedConsent given |              |
|  by: patientPatient understanding: patient states understanding of the |              |
|  procedure being performedPatient consent: the patient's understanding |              |
|  of the procedure matches consent givenProcedure consent: procedure    |              |
| consent matches procedure scheduledRelevant documents: relevant        |              |
| documents present and verifiedTest results: test results available and |              |
|  properly labeledSite marked: the operative site was markedImaging     |              |
| studies: imaging studies availableRequired items: required blood       |              |
| products, implants, devices, and special equipment availablePatient    |              |
| identity confirmed: verbally with patient and arm bandTime out:        |              |
| Immediately prior to procedure a "time out" was called to verify the   |              |
| correct patient, procedure, equipment, support staff and site/side     |              |
 
| marked as required.Preparation: Patient was prepped and draped in the  |              |
| usual sterile fashion.Local anesthesia used: yesLocal anesthetic:      |              |
| lidocaine 1% without epinephrinePatient sedated: yesSedation type:     |              |
| moderate (conscious) sedationSedatives: fentanyl and midazolamVitals:  |              |
| Vital signs were monitored during sedation.Patient tolerance: Patient  |              |
| tolerated the procedure well with no immediate complications [facility]  |              |
| [facility] LEFT CARDIAC CATHETERIZATION REPORT  PATIENT NAME:      |              |
|   Sera Clifford OF BIRTH:    1945MEDICAL RECORD NUMBER:       |              |
|   [id number] OF PROCEDURE:    2017                            |              |
|                                                                        |              |
|                  PRIMARY CARE PROVIDER:   [facility]   |              |
| :    Anastasia Brasher MD, [facility], [facility] PRE-PROCEDURE DIAGNOSIS:   |              |
|    NSTEMIPOST-PROCEDURE DIAGNOSIS:   same PROCEDURES PERFORMED:        |              |
|   1.   Left Heart Catheterization for pressures2.   Coronary           |              |
| Angiography3.   Aortic Root  DESCRIPTION OF PROCEDURE: Informed        |              |
| consent was obtained from the patient, and a time-out was performed to |              |
|  verify the patient's identification and planned procedure.   Please   |              |
| refer to the computer log entry form for precise details.   The        |              |
| patient's right radial artery was sterilely prepped.   Arterial access |              |
|  was achieved with   micropuncture kit.   Patient was then given       |              |
| intravenous heparin as well as intra-arterial nitroglycerin and        |              |
| Nicardipine through the sheath.   A 5 French JR4 and a 3 DRC did not   |              |
| fit the coronary.   A pullback across the valve was used to assess if  |              |
| there was a gradient across the aortic valve A   JL 3.5 did not fit    |              |
| the left system nor did a LBU 3 did not fit.   A JL3 sat close to the  |              |
| anomalous RCA and also opacified the Left.   A pig tail was used to    |              |
| perform an aortic root. .   The TR band occluder device was utilized   |              |
| to achieve successful hemostasis of the radial artery.   There were no |              |
|  immediate complications.       Estimated blood loss was: 10 cc.       |              |
|   Fluro Time: 8.9 Min    Total Reference Air Kerma: 443.55 mGy         |              |
|   Contrast: 105 mls of Omnipaque 350   FINDINGS:Hemodynamics:Left      |              |
| ventricular end-diastolic pressure (LVEDP) was 14 mm Hg.      There    |              |
| was no gradient across the aortic valve. Left Ventriculogram:   Not    |              |
| performed Left main coronary artery:   Normal Left anterior descending |              |
|  coronary artery:   Large vessel.   No significant disease.Circumflex  |              |
| coronary artery:   Dominant.   Gives off 2 marginal and the PDA.   No  |              |
| significant disease.   10% in the AV groove.Right coronary artery:     |              |
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
| cusp.   Minimal luminal irregularity. Aortic Root:   Enlarged No AI No |              |
|  obvious dissection Possible small aortic ulceration CONCLUSIONS:1.    |              |
| Dilated Aortic Root2. No AI3. Normal Left Main4. Normal LAD5. Dominant |              |
|  LCX.   10% luminal irregularity in AV groove.   Gives off the PDA6.   |              |
| Non dominant RCA.   Mild plaque.   Arises anomalously from the left    |              |
| cusp CLINICAL IMPRESSION AND RECOMMENDATIONS Will need CT of aorta to  |              |
| rule out dissectionEcho for LV function  Anastasia Brasher MD, FACC,      |              |
| FSCAIProv[facility] HospitalDATE/TIME:   2017           |              |
| 17:111 17:11 Portions of this chart were created with Dragon    |              |
| voice recognition software. Occasional wrong-word or                   |              |
|                                                                        |              |
| sound-alike                                                            |              |
|   substitutions may have occurred due to the inherent limitations of   |              |
| voice recognition software. Please read the chart carefully and        |              |
| recognize, using context, where these substitutions have occurred      |              |
| Anastasia Brasher2017                                                |              |
|prepped.   Arterial access was achieved with   micropuncture kit.      |              |
|Patient was then given intravenous heparin as well as                   |              |
|intra-arterial nitroglycerin and Nicardipine through the sheath.        |              |
|A 5 French JR4 and a 3 DRC did not fit the coronary.   A pullback       |              |
|across the valve was used to assess if there was a gradient             |              |
|across the aortic valve A   JL 3.5 did not fit the left system nor      |              |
 
|did a LBU 3 did not fit.   A JL3 sat close to the anomalous RCA         |              |
|and also opacified the Left.   A pig tail was used to perform an        |              |
|aortic root. .                                                          |              |
|The TR band occluder device was utilized to achieve successful          |              |
|hemostasis of the radial artery.   There were no immediate              |              |
|complications.                                                         |              |
|                                                                        |              |
|   Estimated blood loss was: 10 cc.                                     |              |
|   Fluro Time: 8.9 Min                                                  |              |
|   Total Reference Air Kerma: 443.55 mGy                                |              |
|   Contrast: 105 mls of Omnipaque 350                                   |              |
|                                                                        |              |
|FINDINGS:                                                              |              |
|Hemodynamics:                                                          |              |
|Left ventricular end-diastolic pressure (LVEDP) was 14 mm Hg.           |              |
|There was no gradient across the aortic valve.                          |              |
|                                                                        |              |
|Left Ventriculogram:   Not performed                                    |              |
|Left main coronary artery:   Normal                                     |              |
|Left anterior descending coronary artery:   Large vessel.   No          |              |
|significant disease.                                                    |              |
|Circumflex coronary artery:   Dominant.   Gives off 2 marginal and      |              |
|the PDA.   No significant disease.   10% in the AV groove.              |              |
|Right coronary artery:   Small non dominant vessel anomalous            |              |
|origin from the left coronary cusp.   Minimal luminal                   |              |
|irregularity.                                                          |              |
|                                                                        |              |
|Aortic Root:                                                            |              |
|Enlarged                                                                |              |
|No AI                                                                   |              |
|No obvious dissection                                                   |              |
|Possible small aortic ulceration                                        |              |
|CONCLUSIONS:                                                           |              |
|1. Dilated Aortic Root                                                  |              |
|2. No AI                                                                |              |
|3. Normal Left Main                                                     |              |
|4. Normal LAD                                                           |              |
|5. Dominant LCX.   10% luminal irregularity in AV groove.   Gives       |              |
|off the PDA                                                             |              |
|6. Non dominant RCA.   Mild plaque.   Arises anomalously from the       |              |
|left cusp                                                               |              |
|                                                                        |              |
|CLINICAL IMPRESSION AND RECOMMENDATIONS                                 |              |
|Will need CT of aorta to rule out dissection                            |              |
|Echo for LV function                                                    |              |
|                                                                        |              |
|                                                                        |              |
|Anastasia Brasher MD, FAC, [facility]                                          |              |
|[facility]                                      |              |
|DATE/TIME:   2017 17:11                                             |              |
|2017 17:11                                                          |              |
|                                                                        |              |
|Portions of this chart were created with Dragon voice recognition       |              |
|software. Occasional wrong-word or    sound-alike    substitutions     |              |
|may have occurred due to the inherent limitations of voice              |              |
|recognition software. Please read the chart carefully and               |              |
|recognize, using context, where these substitutions have occurred       |              |
|                                                                        |              |
 
|                                                                        |              |
|Anastasia Brasher                                                         |              |
|2017                                                                |              |
|                                                                        |              |
+------------------------------------------------------------------------+--------------+
 CT Head w wo Contrast (2017  2:23 PM PST)
 
+----------+
| Specimen |
+----------+
|          |
+----------+
 
 
 
+------------------------------------------------------------------------+---------------+
| Narrative                                                              | Performed At  |
+------------------------------------------------------------------------+---------------+
|   UNENHANCED AND ENHANCED HEAD CT 2017 2:23 PM     CLINICAL        |   [facility] |
| HISTORY:   pain behind right eye           COMPARISON: None available  |               |
|     TECHNIQUE: Axial images are performed through the head both before |               |
|  and after the  uneventful intravenous administration of 80 cc         |               |
| Omnipaque 350 contrast.   Coronal  and sagittal reformations are also  |               |
| performed.      FINDINGS:   There is mild cerebral atrophy and mild,   |               |
| patchy hypoattenuation  involving the deep and periventricular         |               |
| cerebral white matter.   Gray-white  differentiation is maintained.    |               |
|   The ventricles, brainstem and cerebellum are  unremarkable.   There  |               |
| is no mass effect, abnormal enhancement, evidence of  intracranial     |               |
| hemorrhage or extra-axial fluid collection/mass.   Bilateral           |               |
| prosthetic ocular lenses are present.   The orbital contents are       |               |
| otherwise  symmetric and unremarkable, without visible mass lesion or  |               |
| proptosis.   There is  mild mucous membrane thickening within          |               |
| bilateral ethmoid air cells.   Imaged  paranasal sinuses are otherwise |               |
|  well aerated, along with the middle ear cavities  and imaged after    |               |
| air cells.   Asymmetric degeneration of the left greater than  right   |               |
| temporomandibular joints is noted.     There is calcified plaque       |               |
| within the cavernous segments of the internal carotid  arteries.       |               |
|   Major intracranial vessels and dural sinuses otherwise appear patent |               |
|   and grossly unremarkable.   No conclusive aneurysm is visible.       |               |
| IMPRESSION -     1.   NO VISIBLE MASS, ANEURYSM OR OTHER POTENTIAL     |               |
| ETIOLOGY FOR RETRO-ORBITAL  PAIN.     2.   MILD CEREBRAL ATROPHY AND   |               |
| PROBABLE CHRONIC, MICROVASCULAR ISCHEMIC CHANGE OF  THE CEREBRAL WHITE |               |
|  MATTER.     3.   MILD ETHMOID SINUS DISEASE.     4.   ASYMMETRIC      |               |
| DEGENERATION OF THE TEMPOROMANDIBULAR JOINTS.     Dictated and Signed  |               |
| by: Андрей Payton MD    Electronically signed: 2017 2:36 PM        |               |
+------------------------------------------------------------------------+---------------+
 
 
 
+------------------------------------------------------------------------------------------+
| Procedure Note                                                                           |
+------------------------------------------------------------------------------------------+
|   Ace, Rad Results In - 2017  2:39 PM PST  UNENHANCED AND ENHANCED HEAD CT         |
| 2017 2:23 PM CLINICAL HISTORY:  pain behind right eye     COMPARISON: None available |
|  TECHNIQUE: Axial images are performed through the head both before and after            |
| theuneventful intravenous administration of 80 cc Omnipaque 350 contrast.  Coronaland    |
| sagittal reformations are also performed.  FINDINGS:  There is mild cerebral atrophy and |
|  mild, patchy hypoattenuationinvolving the deep and periventricular cerebral white       |
| matter.  Gray-whitedifferentiation is maintained.  The ventricles, brainstem and         |
| cerebellum areunremarkable.  There is no mass effect, abnormal enhancement, evidence     |
 
| ofintracranial hemorrhage or extra-axial fluid collection/mass.  Bilateralprosthetic     |
| ocular lenses are present.  The orbital contents are otherwisesymmetric and              |
| unremarkable, without visible mass lesion or proptosis.  There ismild mucous membrane    |
| thickening within bilateral ethmoid air cells.  Imagedparanasal sinuses are otherwise    |
| well aerated, along with the middle ear cavitiesand imaged after air cells.  Asymmetric  |
| degeneration of the left greater thanright temporomandibular joints is noted.There is    |
| calcified plaque within the cavernous segments of the internal carotidarteries.  Major   |
| intracranial vessels and dural sinuses otherwise appear patentand grossly unremarkable.  |
|  No conclusive aneurysm is visible. IMPRESSION -  1.  NO VISIBLE MASS, ANEURYSM OR OTHER |
|  POTENTIAL ETIOLOGY FOR RETRO-ORBITALPAIN.2.  MILD CEREBRAL ATROPHY AND PROBABLE         |
| CHRONIC, MICROVASCULAR ISCHEMIC CHANGE OFTHE CEREBRAL WHITE MATTER.3.  MILD ETHMOID      |
| SINUS DISEASE.4.  ASYMMETRIC DEGENERATION OF THE TEMPOROMANDIBULAR JOINTS.Dictated and   |
| Signed by: Андрей Payton MD  Electronically signed: 2017 2:36 PM                      |
|                                                                                          |
|There is calcified plaque within the cavernous segments of the internal carotid           |
|arteries.  Major intracranial vessels and dural sinuses otherwise appear patent          |
|and grossly unremarkable.  No conclusive aneurysm is visible.                             |
|                                                                                          |
|IMPRESSION -                                                                              |
|1.  NO VISIBLE MASS, ANEURYSM OR OTHER POTENTIAL ETIOLOGY FOR RETRO-ORBITAL               |
|PAIN.                                                                                    |
|                                                                                          |
|2.  MILD CEREBRAL ATROPHY AND PROBABLE CHRONIC, MICROVASCULAR ISCHEMIC CHANGE OF          |
|THE CEREBRAL WHITE MATTER.                                                                |
|                                                                                          |
|3.  MILD ETHMOID SINUS DISEASE.                                                           |
|                                                                                          |
|4.  ASYMMETRIC DEGENERATION OF THE TEMPOROMANDIBULAR JOINTS.                              |
|                                                                                          |
|Dictated and Signed by: Андрей Payton MD                                                   |
| Electronically signed: 2017 2:36 PM                                                  |
+------------------------------------------------------------------------------------------+
 
 
 
+---------------+---------+--------------------+--------------+
| Performing    | Address | City/[facility] | Phone Number |
| Organization  |         |                    |              |
+---------------+---------+--------------------+--------------+
|   [facility] |         |                    |              |
+---------------+---------+--------------------+--------------+
 ECG 12 lead (2017 12:50 PM PST)
 
+-------------+--------------------------+-----------+------------+--------------+
| Component   | Value                    | Ref Range | Performed  | Pathologist  |
|             |                          |           | At         | Signature    |
+-------------+--------------------------+-----------+------------+--------------+
| VENTRICULAR | 57                       | BPM       | [facility]  |              |
|  RATE EKG   |                          |           |            |              |
+-------------+--------------------------+-----------+------------+--------------+
| ATRIAL RATE | 57                       | BPM       | [facility]  |              |
+-------------+--------------------------+-----------+------------+--------------+
| P-R         | 146                      | ms        | [facility]  |              |
| INTERVAL    |                          |           |            |              |
+-------------+--------------------------+-----------+------------+--------------+
| QRS         | 78                       | ms        | [facility]  |              |
| DURATION    |                          |           |            |              |
+-------------+--------------------------+-----------+------------+--------------+
| Q-T         | 450                      | ms        | [facility]  |              |
| INTERVAL    |                          |           |            |              |
 
+-------------+--------------------------+-----------+------------+--------------+
| Q-T         | 438                      | ms        | [facility]  |              |
| INTERVAL    |                          |           |            |              |
| (CORRECTED) |                          |           |            |              |
+-------------+--------------------------+-----------+------------+--------------+
| P WAVE AXIS | 34                       | degrees   | [facility]  |              |
+-------------+--------------------------+-----------+------------+--------------+
| QRS AXIS    | -2                       | degrees   | [facility]  |              |
+-------------+--------------------------+-----------+------------+--------------+
| T AXIS      | 56                       | degrees   | [facility]  |              |
+-------------+--------------------------+-----------+------------+--------------+
| INTERPRETAT | Sinus bradycardiaLow     |           | [facility]  |              |
| ION TEXT    | voltage QRSNonspecific   |           |            |              |
|             | ST abnormalityBorderline |           |            |              |
|             |  ECGNo previous ECGs     |           |            |              |
|             | availableConfirmed by    |           |            |              |
|             | ANASTASIA BRASHER MD        |           |            |              |
|             | ([id number]) on 2017      |           |            |              |
|             | 1:32:01 PM               |           |            |              |
+-------------+--------------------------+-----------+------------+--------------+
 
 
 
+----------+
| Specimen |
+----------+
|          |
+----------+
 
 
 
+----------------------------------------------------------+--------------+
| Narrative                                                | Performed At |
+----------------------------------------------------------+--------------+
|   This result has an attachment that is not available.   |              |
+----------------------------------------------------------+--------------+
 
 
 
+--------------+---------+--------------------+--------------+
| Performing   | Address | City/State/Zipcode | Phone Number |
| Organization |         |                    |              |
+--------------+---------+--------------------+--------------+
|   [facility]  |         |                    |              |
+--------------+---------+--------------------+--------------+
 PTT (2017 12:44 PM PST)
 
+-----------+-------+-----------------+-------------+--------------+
| Component | Value | Ref Range       | Performed   | Pathologist  |
|           |       |                 | At          | Signature    |
+-----------+-------+-----------------+-------------+--------------+
| aPTT      | 32    | 22 - 36 seconds | [facility]  |              |
|           |       |                 | [facility]    |              |
|           |       |                 | [facility]     |              |
|           |       |                 | [facility] -    |              |
|           |       |                 | LABORATORY  |              |
+-----------+-------+-----------------+-------------+--------------+
 
 
 
 
+----------+
| Specimen |
+----------+
| Blood    |
+----------+
 
 
 
+----------------------+--------------------+--------------------+----------------+
| Performing           | Address            | City/State/Zipcode | Phone Number   |
| Organization         |                    |                    |                |
+----------------------+--------------------+--------------------+----------------+
|   [facility]     |   [address] |   [place]  |   [phone] |
| [facility]  |                    | [id number]              |                |
| - LABORATORY         |                    |                    |                |
+----------------------+--------------------+--------------------+----------------+
 Basic Metabolic Panel (2017 12:44 PM PST)
 
+-------------+--------------------------+-----------------+-------------+--------------+
| Component   | Value                    | Ref Range       | Performed   | Pathologist  |
|             |                          |                 | At          | Signature    |
+-------------+--------------------------+-----------------+-------------+--------------+
| Na          | 141                      | 136 - 149       | [facility]  |              |
|             |                          | mmol/L          | [facility]    |              |
|             |                          |                 | MED[facility]     |              |
|             |                          |                 | CENTER -    |              |
|             |                          |                 | LABORATORY  |              |
+-------------+--------------------------+-----------------+-------------+--------------+
| K           | 3.7                      | 3.5 - 5.1       | [facility]  |              |
|             |                          | mmol/L          | [facility]    |              |
|             |                          |                 | MEDICAL     |              |
|             |                          |                 | CENTER -    |              |
|             |                          |                 | LABORATORY  |              |
+-------------+--------------------------+-----------------+-------------+--------------+
| Cl          | 105                      | 98 - 109 mmol/L | [facility]  |              |
|             |                          |                 | [facility]    |              |
|             |                          |                 | MEDICAL     |              |
|             |                          |                 | CENTER -    |              |
|             |                          |                 | LABORATORY  |              |
+-------------+--------------------------+-----------------+-------------+--------------+
| CO2         | 30                       | 24 - 31 mmol/L  | [facility]  |              |
|             |                          |                 | [facility]    |              |
|             |                          |                 | [facility]     |              |
|             |                          |                 | [facility] -    |              |
|             |                          |                 | LABORATORY  |              |
+-------------+--------------------------+-----------------+-------------+--------------+
| Anion Gap   | 6                        | 3 - 16 mmol/L   | [facility]  |              |
|             |                          |                 | [facility]    |              |
|             |                          |                 | [facility]     |              |
|             |                          |                 | CENTER -    |              |
|             |                          |                 | LABORATORY  |              |
+-------------+--------------------------+-----------------+-------------+--------------+
| Glucose     | 85                       | 70 - 109 mg/dL  | [facility]  |              |
|             |                          |                 | [facility]    |              |
|             |                          |                 | MEDICAL     |              |
|             |                          |                 | CENTER -    |              |
|             |                          |                 | LABORATORY  |              |
+-------------+--------------------------+-----------------+-------------+--------------+
| BUN         | 13                       | 7 - 18 mg/dL    | [facility]  |              |
|             |                          |                 | [facility]    |              |
 
|             |                          |                 | [facility]     |              |
|             |                          |                 | CENTER -    |              |
|             |                          |                 | LABORATORY  |              |
+-------------+--------------------------+-----------------+-------------+--------------+
| Creatinine  | 0.54 (L)                 | 0.60 - 1.30     | [facility]  |              |
|             |                          | mg/dL           | [facility]    |              |
|             |                          |                 | MED[facility]     |              |
|             |                          |                 | CENTER -    |              |
|             |                          |                 | LABORATORY  |              |
+-------------+--------------------------+-----------------+-------------+--------------+
| eGFR if not | >60Comment: GLOMERULAR   | >=60            | [facility]  |              |
|      | FILTRATION               | mL/min/1.73m2   | [facility]    |              |
| [employer]    | RATE,ESTIMATED           |                 | MEDICAL     |              |
|             |   mL/min/1.78l0Ttns than |                 | CENTER -    |              |
|             |  60    Chronic kidney    |                 | LABORATORY  |              |
|             | disease,if found over a  |                 |             |              |
|             | 3-month period.Less than |                 |             |              |
|             |  15    Kidney failureFor |                 |             |              |
|             |                   |                 |             |              |
|             | Americans,multiply the   |                 |             |              |
|             | calculated GFR by 1.21.  |                 |             |              |
|             |                          |                 |             |              |
+-------------+--------------------------+-----------------+-------------+--------------+
| Calcium     | 9.1                      | 8.3 - 10.5      | [facility]  |              |
|             |                          | mg/dL           | [facility]    |              |
|             |                          |                 | [facility]     |              |
|             |                          |                 | CENTER -    |              |
|             |                          |                 | LABORATORY  |              |
+-------------+--------------------------+-----------------+-------------+--------------+
| BUN/Creatin | 24.1                     |                 | [facility]  |              |
| ine Ratio   |                          |                 | [facility]    |              |
|             |                          |                 | [facility]     |              |
|             |                          |                 | CENTER -    |              |
|             |                          |                 | LABORATORY  |              |
+-------------+--------------------------+-----------------+-------------+--------------+
 
 
 
+----------+
| Specimen |
+----------+
| Blood    |
+----------+
 
 
 
+----------------------+--------------------+--------------------+----------------+
| Performing           | Address            | City/State/Zipcode | Phone Number   |
| Organization         |                    |                    |                |
+----------------------+--------------------+--------------------+----------------+
|   [facility]     |   [address] |   [place]  |   [phone] |
| [facility]  |                    | [address]              |                |
| - LABORATORY         |                    |                    |                |
+----------------------+--------------------+--------------------+----------------+
 CBC no Differential (2017 12:44 PM PST)
 
+-------------+---------+-----------------+-------------+--------------+
| Component   | Value   | Ref Range       | Performed   | Pathologist  |
|             |         |                 | At          | Signature    |
+-------------+---------+-----------------+-------------+--------------+
 
| White Blood | 4.2     | 4.0 - 11.0 K/uL | [facility]  |              |
|  Cells      |         |                 |  [facility]    |              |
|             |         |                 | MED[facility]     |              |
|             |         |                 | CENTER -    |              |
|             |         |                 | LABORATORY  |              |
+-------------+---------+-----------------+-------------+--------------+
| Red Blood   | 4.33    | 3.70 - 5.20     | [facility]  |              |
| Cells       |         | M/uL            | [facility]    |              |
|             |         |                 | MED[facility]     |              |
|             |         |                 | CENTER -    |              |
|             |         |                 | LABORATORY  |              |
+-------------+---------+-----------------+-------------+--------------+
| Hemoglobin  | 13.9    | 11.5 - 16.0     | [facility]  |              |
|             |         | g/dL            | [facility]    |              |
|             |         |                 | MED[facility]     |              |
|             |         |                 | CENTER -    |              |
|             |         |                 | LABORATORY  |              |
+-------------+---------+-----------------+-------------+--------------+
| Hematocrit  | 41.2    | 34.0 - 47.0 %   | [facility]  |              |
|             |         |                 | [facility]    |              |
|             |         |                 | [facility]     |              |
|             |         |                 | CENTER -    |              |
|             |         |                 | LABORATORY  |              |
+-------------+---------+-----------------+-------------+--------------+
| MCV         | 95.0    | 83.0 - 101.0 fL | [facility]  |              |
|             |         |                 | [facility]    |              |
|             |         |                 | [facility]     |              |
|             |         |                 | CENTER -    |              |
|             |         |                 | LABORATORY  |              |
+-------------+---------+-----------------+-------------+--------------+
| MCH         | 32.2    | 28.0 - 35.0 pg  | [facility]  |              |
|             |         |                 | [facility]    |              |
|             |         |                 | [facility]     |              |
|             |         |                 | CENTER -    |              |
|             |         |                 | LABORATORY  |              |
+-------------+---------+-----------------+-------------+--------------+
| MCHC        | 33.9    | 32.0 - 36.0     | [facility]  |              |
|             |         | g/dL            | [facility]    |              |
|             |         |                 | MED[facility]     |              |
|             |         |                 | CENTER -    |              |
|             |         |                 | LABORATORY  |              |
+-------------+---------+-----------------+-------------+--------------+
| RDW-CV      | 12.8    | <15.0 %         | [facility]  |              |
|             |         |                 | [facility]    |              |
|             |         |                 | MEDICAL     |              |
|             |         |                 | CENTER -    |              |
|             |         |                 | LABORATORY  |              |
+-------------+---------+-----------------+-------------+--------------+
| Platelet    | 129 (L) | 140 - 440 K/uL  | [facility]  |              |
| Count       |         |                 | [facility]    |              |
|             |         |                 | MEDICAL     |              |
|             |         |                 | CENTER -    |              |
|             |         |                 | LABORATORY  |              |
+-------------+---------+-----------------+-------------+--------------+
| MPV         | 10.1    | fL              | [facility]  |              |
|             |         |                 | [facility]    |              |
|             |         |                 | [facility]     |              |
|             |         |                 | CENTER -    |              |
|             |         |                 | LABORATORY  |              |
+-------------+---------+-----------------+-------------+--------------+
 
 
 
 
+----------+
| Specimen |
+----------+
| Blood    |
+----------+
 
 
 
+----------------------+--------------------+--------------------+----------------+
| Performing           | Address            | City/State/Zipcode | Phone Number   |
| Organization         |                    |                    |                |
+----------------------+--------------------+--------------------+----------------+
|   [facility]     |   [address] |   [place]  |   [phone] |
| [facility]  |                    | [address]              |                |
| - LABORATORY         |                    |                    |                |
+----------------------+--------------------+--------------------+----------------+
 Troponin I (2017 12:44 PM PST)
 
+------------+--------------------------+-------------+-------------+--------------+
| Component  | Value                    | Ref Range   | Performed   | Pathologist  |
|            |                          |             | At          | Signature    |
+------------+--------------------------+-------------+-------------+--------------+
| Troponin I | 2.01 ()Comment:        | <0.06 ng/mL | [facility]  |              |
|            | Critical Result called   |             |  [facility]    |              |
|            | to and read back by      |             | MEDICAL     |              |
|            | CHAS SHRESTHA on   |             | CENTER -    |              |
|            | 2017 at 13:16 by     |             | LABORATORY  |              |
|            | Joe Tarango.          |             |             |              |
|            | Reference                |             |             |              |
|            | Ranges:0.00-0.06 =       |             |             |              |
|            | NORMAL>0.06       =      |             |             |              |
|            | SUSPICIOUS FOR           |             |             |              |
|            | MYOCARDIAL DAMAGE NOTE:  |             |             |              |
|            | Values greater than 0.50 |             |             |              |
|            |  ng/mL have been shown   |             |             |              |
|            | to be strongly           |             |             |              |
|            | associated with acute    |             |             |              |
|            | myocardial infarction.   |             |             |              |
|            | The [employer] College of  |             |             |              |
|            | Cardiology (ACC)         |             |             |              |
|            | recommends a decision    |             |             |              |
|            | limit of 0.06 ng/mL for  |             |             |              |
|            | this assay.   Results    |             |             |              |
|            | greater than 0.06 can    |             |             |              |
|            | reflect a pre-infarct    |             |             |              |
|            | acute coronary syndrome, |             |             |              |
|            |  but can also reflect    |             |             |              |
|            | myocardial necrosis or   |             |             |              |
|            | injury that is not due   |             |             |              |
|            | to coronary artery       |             |             |              |
|            | disease.   Some of these |             |             |              |
|            |  causes are sepsis,      |             |             |              |
|            | hypocolemia, atrial      |             |             |              |
|            | fibrillation, heart      |             |             |              |
|            | failure, pulmonary       |             |             |              |
|            | embolism, myocarditis,   |             |             |              |
|            | myocardial contusion,    |             |             |              |
 
|            | and renal failure.   The |             |             |              |
|            |  diagnosis of myocardial |             |             |              |
|            |  infarction should be    |             |             |              |
|            | based on a combination   |             |             |              |
|            | of the patient's         |             |             |              |
|            | clinical presentation    |             |             |              |
|            | and the clinical         |             |             |              |
|            | laboratory test results  |             |             |              |
|            | (especially serial       |             |             |              |
|            | troponin levels).        |             |             |              |
+------------+--------------------------+-------------+-------------+--------------+
 
 
 
+----------+
| Specimen |
+----------+
| Blood    |
+----------+
 
 
 
+----------------------+--------------------+--------------------+----------------+
| Performing           | Address            | City/State/Zipcode | Phone Number   |
| Organization         |                    |                    |                |
+----------------------+--------------------+--------------------+----------------+
|   [facility]     |   [address] |   [place]  |   [phone] |
| [facility]  |                    | [address]              |                |
| - LABORATORY         |                    |                    |                |
+----------------------+--------------------+--------------------+----------------+
 XR Chest PA or LUIS (2017  4:55 AM PST)
 
+----------+
| Specimen |
+----------+
|          |
+----------+
 
 
 
+--------------------------------------------------------------------+---------------+
| Narrative                                                          | Performed At  |
+--------------------------------------------------------------------+---------------+
|   External films for comparison only - no result from [facility].  |   [facility] |
+--------------------------------------------------------------------+---------------+
 
 
 
+---------------+---------+--------------------+--------------+
| Performing    | Address | City/State/Zipcode | Phone Number |
| Organization  |         |                    |              |
+---------------+---------+--------------------+--------------+
|   [facility] |         |                    |              |
+---------------+---------+--------------------+--------------+
 ECG - EXTERNAL SCAN (2017 12:00 AM PST)
 
+------------------------------------------------------------------------+--------------+
| Narrative                                                              | Performed At |
+------------------------------------------------------------------------+--------------+
|   This result has an attachment that is not available.  Ordered by an  |              |
 
| unspecified provider.                                                  |              |
+------------------------------------------------------------------------+--------------+
 documented in this encounter
 
 Visit Diagnoses
 
 
+-------------------------------------------------------------------------------------+
| Diagnosis                                                                           |
+-------------------------------------------------------------------------------------+
|   NSTEMI (non-ST elevated myocardial infarction) (HCC) - Primary  Acute myocardial  |
| infarction, subendocardial infarction, episode of care unspecified                  |
+-------------------------------------------------------------------------------------+
|   Acquired hypothyroidism  Unspecified hypothyroidism                               |
+-------------------------------------------------------------------------------------+
|   Pure hypercholesterolemia                                                         |
+-------------------------------------------------------------------------------------+
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
+-------------------------------------------------------------------------------------+
|   Depression  Depressive disorder, not elsewhere classified                         |
+-------------------------------------------------------------------------------------+
 documented in this encounter
 
 Administered Medications
 
 
+-----------------------------------+--------+----------+--------+------+------+
| Medication Order                  | MAR    | Action   | Dose   | Rate | Site |
|                                   | Action | Date     |        |      |      |
+-----------------------------------+--------+----------+--------+------+------+
|   acetaminophen (TYLENOL) tablet  | Given  | 20 | 650 mg |      |      |
| 650 mg  650 mg, Oral, EVERY 6     |        | 17 11:57 |        |      |      |
| HOURS PRN, Pain, Fever, Starting  |        |  PM PST  |        |      |      |
| Sun 17 at 1751, Post-op/Phase |        |          |        |      |      |
|  II                               |        |          |        |      |      |
+-----------------------------------+--------+----------+--------+------+------+
 
 
 
+---+---+
|   |   |
+---+---+
 
 
 
+-----------------------------------+-------+----------+-------+---+---+
|   aspirin EC tablet 81 mg  81 mg, | Given | 20 | 81 mg |   |   |
|  Oral, DAILY, First dose on Sun   |       | 17  8:44 |       |   |   |
| 17 at 1315, Do not cut or     |       |  AM PST  |       |   |   |
| crush.,                           |       |          |       |   |   |
+-----------------------------------+-------+----------+-------+---+---+
 
 
 
+-------+----------+-------+---+---+
| Given | 20 | 81 mg |   |   |
|       | 17  8:42 |       |   |   |
|       |  AM PST  |       |   |   |
+-------+----------+-------+---+---+
 
 
 
 
+---+---+
|   |   |
+---+---+
 
 
 
+----------------------------------+-------+----------+-------+---+---+
|   atorvaSTATin (LIPITOR) tablet  | Given | 20 | 40 mg |   |   |
| 40 mg  40 mg, Oral, NIGHTLY,     |       | 17  9:11 |       |   |   |
| First dose on Sun 17 at 2100 |       |  PM PST  |       |   |   |
+----------------------------------+-------+----------+-------+---+---+
 
 
 
+-------+----------+-------+---+---+
| Given | 20 | 40 mg |   |   |
|       | 17  8:54 |       |   |   |
|       |  PM PST  |       |   |   |
+-------+----------+-------+---+---+
 
 
 
+---+---+
|   |   |
+---+---+
 
 
 
+-----------------------------------+-------+----------+-------+---+---+
|   FLUoxetine (PROzac) capsule 20  | Given | 20 | 20 mg |   |   |
| mg  20 mg, Oral, DAILY, First     |       | 17  9:11 |       |   |   |
| dose on Sun 17 at 1315        |       |  PM PST  |       |   |   |
+-----------------------------------+-------+----------+-------+---+---+
 
 
 
+-------+----------+-------+---+---+
| Given | 20 | 20 mg |   |   |
|       | 17  5:27 |       |   |   |
|       |  PM PST  |       |   |   |
+-------+----------+-------+---+---+
 
 
 
+---+---+
|   |   |
+---+---+
 
 
 
+-----------------------------------+-------+----------+--------+---+---+
|   iohexol (OMNIPAQUE 350) 350     | Given | 20 | 80 mLs |   |   |
| mg/mL injection 80 mL  80 mL,     |       | 17  2:24 |        |   |   |
| Intravenous, ONCE PRN, Other,     |       |  PM PST  |        |   |   |
| Starting Sun 17 at 1423, For  |       |          |        |   |   |
| 1 dose, Cat Scanner               |       |          |        |   |   |
+-----------------------------------+-------+----------+--------+---+---+
 
 
 
 
+---+---+
|   |   |
+---+---+
 
 
 
+-----------------------------------+-------+----------+--------+---+---+
|   iohexol (OMNIPAQUE 350) 350     | Given | 20 | 85 mLs |   |   |
| mg/mL injection 85 mL  85 mL,     |       | 17  5:07 |        |   |   |
| Intravenous, ONCE PRN, Other,     |       |  PM PST  |        |   |   |
| Starting Sun 17 at 1709, For  |       |          |        |   |   |
| 1 dose, Cat Scanner               |       |          |        |   |   |
+-----------------------------------+-------+----------+--------+---+---+
 
 
 
+---+---+
|   |   |
+---+---+
 
 
 
+----------------------------------+-------+----------+---------+---+---+
|   levothyroxine (SYNTHROID,      | Given | 20 | 100 mcg |   |   |
| LEVOTHROID) tablet 100 mcg  100  |       | 17  6:30 |         |   |   |
| mcg, Oral, DAILY BEFORE          |       |  AM PST  |         |   |   |
| BREAKFAST, First dose on Sun     |       |          |         |   |   |
| 17 at 1315, Give before      |       |          |         |   |   |
| breakfast.,                      |       |          |         |   |   |
+----------------------------------+-------+----------+---------+---+---+
 
 
 
+-------+----------+---------+---+---+
| Given | 20 | 100 mcg |   |   |
|       | 17  6:32 |         |   |   |
|       |  AM PST  |         |   |   |
+-------+----------+---------+---+---+
| Given | 20 | 100 mcg |   |   |
|       | 17  5:28 |         |   |   |
|       |  PM PST  |         |   |   |
+-------+----------+---------+---+---+
 
 
 
+---+---+
|   |   |
+---+---+
 
 
 
+----------------------------------+---------+----------+-----+----------+---+
|   magnesium sulfate 2 g/50 mL    | New Bag | 20 | 2 g | 25 mL/hr |   |
| IVPB 2 g  2 g, Intravenous,      |         | 17  8:42 |     |          |   |
| Administer over 120 Minutes,     |         |  AM PST  |     |          |   |
| ONCE, Mon 17 at 0830, For 1  |         |          |     |          |   |
| dose, Maximum recommended        |         |          |     |          |   |
| infusion rate = 1 gram/hour.,    |         |          |     |          |   |
 
+----------------------------------+---------+----------+-----+----------+---+
 
 
 
+---+---+
|   |   |
+---+---+
 
 
 
+-----------------------------------+-------+----------+-------+---+---+
|   metoprolol tartrate (LOPRESSOR) | Given | 20 | 25 mg |   |   |
|  tablet 25 mg  25 mg, Oral, 2     |       | 17  5:28 |       |   |   |
| TIMES DAILY, First dose on Sun    |       |  PM PST  |       |   |   |
| 17 at 1730                    |       |          |       |   |   |
+-----------------------------------+-------+----------+-------+---+---+
 
 
 
+---+---+
|   |   |
+---+---+
 
 
 
+-----------------------------------+-------+----------+-------+---+---+
|   metoprolol tartrate (LOPRESSOR) | Given | 20 | 25 mg |   |   |
|  tablet 25 mg  25 mg, Oral,      |       | 17 10:16 |       |   |   |
| TIMES DAILY, First dose (after    |       |  AM PST  |       |   |   |
| last modification) on Tue 1/3/17  |       |          |       |   |   |
| at 0900                           |       |          |       |   |   |
+-----------------------------------+-------+----------+-------+---+---+
 
 
 
+---+---+
|   |   |
+---+---+
 
 
 
+-----------------------------------+-------+----------+-------+---+---+
|   metoprolol tartrate (LOPRESSOR) | Given | 20 | 50 mg |   |   |
|  tablet 50 mg  50 mg, Oral,      |       | 17  9:11 |       |   |   |
| TIMES DAILY, First dose (after    |       |  PM PST  |       |   |   |
| last modification) on 17  |       |          |       |   |   |
| at 0830                           |       |          |       |   |   |
+-----------------------------------+-------+----------+-------+---+---+
 
 
 
+-------+----------+-------+---+---+
| Given | 20 | 50 mg |   |   |
|       | 17  8:42 |       |   |   |
|       |  AM PST  |       |   |   |
+-------+----------+-------+---+---+
 
 
 
+---+---+
 
|   |   |
+---+---+
 
 
 
+----------------------------------+-------+----------+--------+---+---+
|   potassium chloride (K-DUR) ER  | Given | 20 | 20 mEq |   |   |
| tablet 20 mEq  20 mEq, Oral,     |       | 17  6:32 |        |   |   |
| ONCE, Mon 17 at 0615, For 1  |       |  AM PST  |        |   |   |
| dose                             |       |          |        |   |   |
+----------------------------------+-------+----------+--------+---+---+
 
 
 
+---+---+
|   |   |
+---+---+
 
 
 
+-----------------------------------+------+----------+--------+-------+---+
|   sodium chloride 0.9% (NS) bolus | Push | 20 | 50 mLs | 3000  |   |
|  50 mL  50 mL, Intravenous,       |      | 17  5:07 |        | mL/hr |   |
| Administer over 1 Minutes, ONCE   |      |  PM PST  |        |       |   |
| PRN, for contrast study, Starting |      |          |        |       |   |
|  Sun 1/1/17 at 1708, For 1 dose,  |      |          |        |       |   |
| May infuse at a different rate    |      |          |        |       |   |
| per protocol., Cat Scanner        |      |          |        |       |   |
+-----------------------------------+------+----------+--------+-------+---+
 
 
 
+---+---+
|   |   |
+---+---+
 
 
 
+-----------------------------------+---------+----------+---+-------+---+
|   sodium chloride 0.9% (NS)       | New Bag | 20 |   | 125   |   |
| infusion  at 125 mL/hr,           |         | 17 12:49 |   | mL/hr |   |
| Intravenous, CONTINUOUS, Starting |         |  AM PST  |   |       |   |
|  Sun 17 at 1345               |         |          |   |       |   |
+-----------------------------------+---------+----------+---+-------+---+
 
 
 
+---------+----------+---+-------+---+
| New Bag | 20 |   | 125   |   |
|         | 17  1:25 |   | mL/hr |   |
|         |  PM PST  |   |       |   |
+---------+----------+---+-------+---+
 
 
 
+---+---+
|   |   |
+---+---+
 documented in this encounter

## 2020-07-23 NOTE — XMS
Encounter Summary
  Created on: 2020
 
 Parrish Weeksgabe Montero
 External Reference #: [id number]
 : [birth year]
 Sex: Female
 
 Demographics
 
 
+-----------------------+----------------------+
| Address               | [address]         |
|                       | [place]  [address] |
+-----------------------+----------------------+
| Home Phone            | [phone]      |
+-----------------------+----------------------+
| Preferred Language    | Unknown              |
+-----------------------+----------------------+
| Marital Status        |               |
+-----------------------+----------------------+
| Baptism Affiliation | Unknown              |
+-----------------------+----------------------+
| Race                  | Unknown              |
+-----------------------+----------------------+
| Ethnic Group          | Unknown              |
+-----------------------+----------------------+
 
 
 Author
 
 
+--------------+--------------------------------------------+
| Author       | [facility]  |
|              | and [facility]                                |
+--------------+--------------------------------------------+
| Organization | [facility]  |
|              | and [facility]                                |
+--------------+--------------------------------------------+
| Address      | Unknown                                    |
+--------------+--------------------------------------------+
| Phone        | Unavailable                                |
+--------------+--------------------------------------------+
 
 
 
 Support
 
 
+-------------------+--------------+---------+-----------------+
| Name              | Relationship | Address | Phone           |
+-------------------+--------------+---------+-----------------+
| Melissa Lau | ECON         | Unknown | [phone] |
+-------------------+--------------+---------+-----------------+
| Mike Lau   | ECON         | Unknown | [phone] |
+-------------------+--------------+---------+-----------------+
| Damaris Jonna     | ECON         | Unknown | [phone] |
+-------------------+--------------+---------+-----------------+
 
 
 
 
 Care Team Providers
 
 
+-----------------------+------+-----------------+
| Care Team Member Name | Role | Phone           |
+-----------------------+------+-----------------+
| Chandra King MD | PCP  | [phone] |
+-----------------------+------+-----------------+
 
 
 
 Reason for Visit
 
 
+-----------+----------+
| Reason    | Comments |
+-----------+----------+
| Follow-up |          |
+-----------+----------+
 
 
 
 Encounter Details
 
 
+--------+---------+----------------------+----------------------+-------------------+
| Date   | Type    | Department           | Care Team            | Description       |
+--------+---------+----------------------+----------------------+-------------------+
| 08/10/ | Office  |   [facility]          |   Toño Wilcox MD | Thoracic aortic   |
| 2017   | Visit   | CARDIOLOGY  [address]    |   [address]    | aneurysm without  |
|        |         | [place], | [place], [place]      | rupture (HCC)     |
|        |         |  [place] [address]       | [address]  [phone]  | (Primary Dx)      |
|        |         | [phone]         |  [phone] (Fax)  |                   |
+--------+---------+----------------------+----------------------+-------------------+
 
 
 
 Social History
 
 
+---------------+-------+-----------+--------+---------------------+
| Tobacco Use   | Types | Packs/Day | Years  | Date                |
|               |       |           | Used   |                     |
+---------------+-------+-----------+--------+---------------------+
| Former Smoker |       |           |        | Started: 1992 |
+---------------+-------+-----------+--------+---------------------+
 
 
 
+-------------+-------------+---------+----------+
| Alcohol Use | Drinks/Week | oz/Week | Comments |
+-------------+-------------+---------+----------+
| No          |             |         |          |
+-------------+-------------+---------+----------+
 
 
 
 
+------------------+---------------+
| Sex Assigned at  | Date Recorded |
| Birth            |               |
+------------------+---------------+
| Not on file      |               |
+------------------+---------------+
 documented as of this encounter
 
 Last Filed Vital Signs
 
 
+-------------------+------------------+----------------------+----------+
| Vital Sign        | Reading          | Time Taken           | Comments |
+-------------------+------------------+----------------------+----------+
| Blood Pressure    | 122/72           | 08/10/2017 11:29 AM  |          |
|                   |                  | PDT                  |          |
+-------------------+------------------+----------------------+----------+
| Pulse             | 68               | 08/10/2017 11:29 AM  |          |
|                   |                  | PDT                  |          |
+-------------------+------------------+----------------------+----------+
| Temperature       | -                | -                    |          |
+-------------------+------------------+----------------------+----------+
| Respiratory Rate  | 14               | 08/10/2017 11:29 AM  |          |
|                   |                  | PDT                  |          |
+-------------------+------------------+----------------------+----------+
| Oxygen Saturation | -                | -                    |          |
+-------------------+------------------+----------------------+----------+
| Inhaled Oxygen    | -                | -                    |          |
| Concentration     |                  |                      |          |
+-------------------+------------------+----------------------+----------+
| Weight            | 77.6 kg (171 lb) | 08/10/2017 11:29 AM  |          |
|                   |                  | PDT                  |          |
+-------------------+------------------+----------------------+----------+
| Height            | 160 cm (5' 3")   | 08/10/2017 11:29 AM  |          |
|                   |                  | PDT                  |          |
+-------------------+------------------+----------------------+----------+
| Body Mass Index   | 30.29            | 08/10/2017 11:29 AM  |          |
|                   |                  | PDT                  |          |
+-------------------+------------------+----------------------+----------+
 documented in this encounter
 
 Progress Notes
 Toño Wilcox MD - 08/10/2017 11:45 AM MickiLuis Carlos Zamora Micky presents for outpatient foll
ow-up and repeat echocardiography.  The patient was last seen in our clinic by Dr. Anastasia Russo ms in 2017 at which time she underwent echocardiography to evaluate a mildly enlarge
D or upper normal ascending aortic.  The patient had developed a skin rash after initiation 
of amlodipine in January however her rash resolved and she continues on amlodipine without i
ntolerance.  She has no complaints of chest pain nor dyspnea.  She reports her blood pressur
e control has been good.  She has no complaints of palpitation.
 
 Repeat transthoracic echocardiography today shows normal left ventricular chamber size and 
systolic function.  The maximal transverse diameter of the ascending aorta is 3.96 which is 
upper normal.  There is no evidence of dissection.  There is no interval change in her aorti
c diameter measurement since her prior echocardiogram in 2017.
 
 PHYSICAL EXAMINATION
 Neck: Supple, no bruit
 Chest: Clear to auscultation
 Cardiac: Regular rhythm, no S3, jugular venous pressure normal
 Extremities: Trace edema
 
 
 ASSESSMENT/PLAN
 
 1.  Ascending aortic dilatation: No interval change in thoracic aortic diameter from prior 
echocardiogram in January.
 2.  Coronary artery disease: Patient with nonobstructive coronary artery disease on previou
s angiogram
 
 RECOMMENDATIONS
 Continue home blood pressure monitoring
 Recommend whole food plant-based diet, avoid processed foods and dietary sodium
 No further cardiac testing at this time
 Follow-up as neededElectronically signed by Toño CHE MD at 08/15/2017  5:48 PM PDTd
ocumented in this encounter
 
 Plan of Treatment
 Not on filedocumented as of this encounter
 
 Visit Diagnoses
 
 
+----------------------------------------------------------------------------------------+
| Diagnosis                                                                              |
+----------------------------------------------------------------------------------------+
|   Thoracic aortic aneurysm without rupture (HCC) - Primary  Thoracic aneurysm without  |
| mention of rupture                                                                     |
+----------------------------------------------------------------------------------------+
 documented in this encounter

## 2020-07-23 NOTE — XMS
Encounter Summary
  Created on: 2020
 
 Parrish Weeksgabe Montero
 External Reference #: [id number]
 : [birth year]
 Sex: Female
 
 Demographics
 
 
+-----------------------+----------------------+
| Address               | [address]         |
|                       | [place]  [address] |
+-----------------------+----------------------+
| Home Phone            | [phone]      |
+-----------------------+----------------------+
| Preferred Language    | Unknown              |
+-----------------------+----------------------+
| Marital Status        |               |
+-----------------------+----------------------+
| [employer] Affiliation | Unknown              |
+-----------------------+----------------------+
| Race                  | Unknown              |
+-----------------------+----------------------+
| Ethnic Group          | Unknown              |
+-----------------------+----------------------+
 
 
 Author
 
 
+--------------+--------------------------------------------+
| Author       | [facility]  |
|              | [facility]                                |
+--------------+--------------------------------------------+
| Organization | [facility]  |
|              | [facility]                                |
+--------------+--------------------------------------------+
| Address      | Unknown                                    |
+--------------+--------------------------------------------+
| Phone        | Unavailable                                |
+--------------+--------------------------------------------+
 
 
 
 Support
 
 
+-------------------+--------------+---------+-----------------+
| Name              | Relationship | Address | Phone           |
+-------------------+--------------+---------+-----------------+
| Melissa Lau | ECON         | Unknown | [phone] |
+-------------------+--------------+---------+-----------------+
| Mike Lau   | ECON         | Unknown | [phone] |
+-------------------+--------------+---------+-----------------+
| Damarisarmando Coyle     | ECON         | Unknown | [phone] |
+-------------------+--------------+---------+-----------------+
 
 
 
 
 Care Team Providers
 
 
+-----------------------+------+-----------------+
| Care Team Member Name | Role | Phone           |
+-----------------------+------+-----------------+
| Chandra King MD | PCP  | [phone] |
+-----------------------+------+-----------------+
 
 
 
 Reason for Visit
 
 
+--------------------+--------+----------+
| Reason             | Onset  | Comments |
|                    | Date   |          |
+--------------------+--------+----------+
| Hospital Follow-up | / |          |
|                    |    |          |
+--------------------+--------+----------+
 
 
 
 Encounter Details
 
 
+--------+-----------+----------------------+---------------------+--------------------+
| Date   | Type      | Department           | Care Team           | Description        |
+--------+-----------+----------------------+---------------------+--------------------+
| / | Telephone |   [facility] |   Karla Hinojosa,  | Hospital Follow-up |
|    |           |  [facility]    | PharmD  [address]      |                    |
|        |           | [address]  | [facility]   |                    |
|        |           | [place] [address] | [place] [address]     |                    |
|        |           |   [phone]       | [phone]  |                    |
+--------+-----------+----------------------+---------------------+--------------------+
 
 
 
 Social History
 
 
+---------------+-------+-----------+--------+---------------------+
| Tobacco Use   | Types | Packs/Day | Years  | Date                |
|               |       |           | Used   |                     |
+---------------+-------+-----------+--------+---------------------+
| Former Smoker |       |           |        | Started: 1992 |
+---------------+-------+-----------+--------+---------------------+
 
 
 
+-------------+-------------+---------+----------+
| Alcohol Use | Drinks/Week | oz/Week | Comments |
+-------------+-------------+---------+----------+
| No          |             |         |          |
+-------------+-------------+---------+----------+
 
 
 
 
+------------------+---------------+
| Sex Assigned at  | Date Recorded |
| Birth            |               |
+------------------+---------------+
| Not on file      |               |
+------------------+---------------+
 documented as of this encounter
 
 Miscellaneous Notes
 Telephone Encounter - Karla Hinojosa PharmD - 2017  2:24 PM PSTHospital Follow-Up P
janey Call
 
 Date discharged: 1/3/17
 Primary Diagnosis: NSTEMI
 
 No answer--third attempt at Trans[facility] of Care follow-up phone call.  No further attempts 
will be made.
 
 Date of follow-up appointment with cardiologist:  @1315 with Dr. Cohn
 Follow-up with PCP: Dr. King in 1 week
 
 Electronically signed by: Karla Hinojosa PHARMD 2017 14:26
 Electronically signed by Theodore GrahamD at 2017  2:26 PM PSTTelephone [facility]
er - Karla Hinojosa PharmD - 2017  3:31 PM PSTHospital Follow-Up Phone Call
 
 Date discharged: 1/3/17
 Primary Diagnosis: NSTEMI
 
 No answer; left message.  Will try again on 17.
 
 Date of follow-up appointment with cardiologist:  @ 1315 with Dr. Cohn 
 Follow-up with PCP: Dr. King in 1 week
 
 Electronically signed by: Karla Hinojosa PHARMD 2017 15:33
 Electronically signed by Karla Hinojosa PharmD at 2017  3:33 PM [facility]Telephone [facility]
er - Karla Hinojosa PharmD - 2017  3:58 PM PSTHospital Follow-Up Phone Call
 
 Date discharged: 1/3/17
 Primary Diagnosis: NSTEMI
 
 No answer; left message.  Will try again tomorrow (17).
 
 Date of follow-up appointment with cardiologist:  @ 1315 with Dr. Cohn
 Follow-up with PCP: Dr. King in 1 week
 
 Electronically signed by Karla Hinojosa PharmROGERIO at 2017  4:05 PM PSTdocumented in thi
s encounter
 
 Plan of Treatment
 Not on filedocumented as of this encounter
 
 Visit Diagnoses
 Not on filedocumented in this encounter

## 2020-07-23 NOTE — XMS
Encounter Summary
  Created on: 2020
 
 Parrish Weeksgabe Montero
 External Reference #: [id number]
 : [birth year]
 Sex: Female
 
 Demographics
 
 
+-----------------------+----------------------+
| Address               | [address]         |
|                       | [place]  [address] |
+-----------------------+----------------------+
| Home Phone            | [phone]      |
+-----------------------+----------------------+
| Preferred Language    | Unknown              |
+-----------------------+----------------------+
| Marital Status        |               |
+-----------------------+----------------------+
| Faith Affiliation | Unknown              |
+-----------------------+----------------------+
| Race                  | Unknown              |
+-----------------------+----------------------+
| Ethnic Group          | Unknown              |
+-----------------------+----------------------+
 
 
 Author
 
 
+--------------+--------------------------------------------+
| Author       | formerly [facility]  |
|              | and [facility]                                |
+--------------+--------------------------------------------+
| Organization | formerly [facility]  |
|              | and [facility]                                |
+--------------+--------------------------------------------+
| Address      | Unknown                                    |
+--------------+--------------------------------------------+
| Phone        | Unavailable                                |
+--------------+--------------------------------------------+
 
 
 
 Support
 
 
+-------------------+--------------+---------+-----------------+
| Name              | Relationship | Address | Phone           |
+-------------------+--------------+---------+-----------------+
| Melissa Lau | ECON         | Unknown | [phone] |
+-------------------+--------------+---------+-----------------+
| Mike Lau   | ECON         | Unknown | [phone] |
+-------------------+--------------+---------+-----------------+
| Damaris Coyle     | ECON         | Unknown | [phone] |
+-------------------+--------------+---------+-----------------+
 
 
 
 
 Care Team Providers
 
 
+-----------------------+------+-----------------+
| Care Team Member Name | Role | Phone           |
+-----------------------+------+-----------------+
| Chandra King MD | PCP  | [phone] |
+-----------------------+------+-----------------+
 
 
 
 Reason for Referral
 Diagnostic/Screening (Routine)
 
+--------+--------+-----------+--------------+--------------+---------------+
| Status | Reason | Specialty | Diagnoses /  | Referred By  | Referred To   |
|        |        |           | Procedures   | Contact      | Contact       |
+--------+--------+-----------+--------------+--------------+---------------+
| Closed |        | Radiology |   Diagnoses  |   [place],  |   [facility]    |
|        |        |           |  Dilated     | Anastasia CASTILLO MD   | [address]  |
|        |        |           | aortic root  | [address] |  [place], |
|        |        |           | (HCC)  Heart |  [place]  [place]   |  [place]           |
|        |        |           |  murmur      | [place]    | [address]    |
|        |        |           | Procedures   | [address]        | Phone:        |
|        |        |           | ECHO         | Phone:       | [phone]  |
|        |        |           | Complete  [place] | [phone] |  Fax:         |
|        |        |           |  ECHO HEART  |   Fax:       | [phone]  |
|        |        |           | XTHORACIC,CO | [phone] |               |
|        |        |           | MPLETE W     |              |               |
|        |        |           | DOPPLER  [place]  |              |               |
|        |        |           | ECHO HEART   |              |               |
|        |        |           | XTHORACIC,CO |              |               |
|        |        |           | MPLETE, W/O  |              |               |
|        |        |           | DOPPLER      |              |               |
+--------+--------+-----------+--------------+--------------+---------------+
 
 
 
 
 Reason for Visit
 
 
+--------------------+----------+
| Reason             | Comments |
+--------------------+----------+
| Hospital Follow-up |          |
+--------------------+----------+
 Evaluate & Treat (Routine)
 
+--------+--------+------------+--------------+--------------+---------------+
| Status | Reason | Specialty  | Diagnoses /  | Referred By  | Referred To   |
|        |        |            | Procedures   | Contact      | Contact       |
+--------+--------+------------+--------------+--------------+---------------+
| Closed |        | Cardiology |   Diagnoses  |   Christine,    |   [place],   |
|        |        |            |  Non-ST      | Chandra Siu,  | Anastasia CASTILLO MD    |
|        |        |            | elevation    | MD  [address]     | [address]  |
|        |        |            | (NSTEMI)     | [place]    | ST  [place]     |
 
|        |        |            | myocardial   | [address]        | [place]     |
|        |        |            | infarction   | [place],   | [address]  Phone: |
|        |        |            | (HCC)        | [place]           |  [phone] |
|        |        |            | Procedures   | [address]   |   Fax:        |
|        |        |            | NP IN CLINIC | Phone:       | [phone]  |
|        |        |            |  - HOSP FUP  | [phone] |               |
|        |        |            |              |   Fax:       |               |
|        |        |            |              | [phone] |               |
+--------+--------+------------+--------------+--------------+---------------+
 
 
 
 
 Encounter Details
 
 
+--------+---------+----------------------+----------------------+----------------------+
| Date   | Type    | Department           | Care Team            | Description          |
+--------+---------+----------------------+----------------------+----------------------+
| / | Office  |   [facility]          |   Anastasia Cohn,  | Dilated aortic root  |
| 2017   | Visit   | CARDIOLOGY  [address]    | MD  [address]  | (Formerly [facility]) (Primary Dx);  |
|        |         | [address]  [place], |  [place]     | Heart murmur; [place]   |
|        |         |  [place] [address]       | [id number] [phone]  | hypercholesterolemia |
|        |         | [phone]         |  [phone] (Fax)  | ; NSTEMI (non-ST     |
|        |         |                      |                      | elevated myocardial  |
|        |         |                      |                      | infarction) (Formerly [facility]);   |
|        |         |                      |                      | Anomalous right      |
|        |         |                      |                      | coronary artery;     |
|        |         |                      |                      | Rash                 |
+--------+---------+----------------------+----------------------+----------------------+
 
 
 
 Social History
 
 
+---------------+-------+-----------+--------+---------------------+
| Tobacco Use   | Types | Packs/Day | Years  | Date                |
|               |       |           | Used   |                     |
+---------------+-------+-----------+--------+---------------------+
| Former Smoker |       |           |        | Started: 1992 |
+---------------+-------+-----------+--------+---------------------+
 
 
 
+-------------+-------------+---------+----------+
| Alcohol Use | Drinks/Week | oz/Week | Comments |
+-------------+-------------+---------+----------+
| No          |             |         |          |
+-------------+-------------+---------+----------+
 
 
 
+------------------+---------------+
| Sex Assigned at  | Date Recorded |
| Birth            |               |
+------------------+---------------+
| Not on file      |               |
+------------------+---------------+
 documented as of this encounter
 
 
 Last Filed Vital Signs
 
 
+-------------------+------------------+----------------------+----------+
| Vital Sign        | Reading          | Time Taken           | Comments |
+-------------------+------------------+----------------------+----------+
| Blood Pressure    | 120/74           | 2017  1:03 PM  |          |
|                   |                  | PST                  |          |
+-------------------+------------------+----------------------+----------+
| Pulse             | 62               | 2017  1:03 PM  |          |
|                   |                  | PST                  |          |
+-------------------+------------------+----------------------+----------+
| Temperature       | -                | -                    |          |
+-------------------+------------------+----------------------+----------+
| Respiratory Rate  | 16               | 2017  1:03 PM  |          |
|                   |                  | PST                  |          |
+-------------------+------------------+----------------------+----------+
| Oxygen Saturation | -                | -                    |          |
+-------------------+------------------+----------------------+----------+
| Inhaled Oxygen    | -                | -                    |          |
| Concentration     |                  |                      |          |
+-------------------+------------------+----------------------+----------+
| Weight            | 77.6 kg (171 lb) | 2017  1:03 PM  |          |
|                   |                  | PST                  |          |
+-------------------+------------------+----------------------+----------+
| Height            | 160 cm (5' 3")   | 2017  1:03 PM  |          |
|                   |                  | PST                  |          |
+-------------------+------------------+----------------------+----------+
| Body Mass Index   | 30.29            | 2017  1:03 PM  |          |
|                   |                  | PST                  |          |
+-------------------+------------------+----------------------+----------+
 documented in this encounter
 
 Progress Notes
 Anastasia Cohn MD - 2017  2:20 PM PST Physical exam:
 /74 mmHg | Pulse 62 | Resp 16 | Ht 1.6 m (5' 3") | Wt 77.565 kg (171 lb) | BMI 30.30 
kg/m2
 Physical Exam 
 Constitutional: She is oriented to person, place, and time. She appears well-developed and 
well-nourished. 
 HENT: 
 Head: Normocephalic and atraumatic. 
 Eyes: Conjunctivae are normal. No scleral icterus. 
 Neck: Normal range of motion. Neck supple. No JVD present. 
 Cardiovascular: Normal rate and regular rhythm.  
 Murmur heard.
 Pulmonary/Chest: Effort normal and breath sounds normal. 
 Abdominal: Soft. Bowel sounds are normal. There is no tenderness. 
 Neurological: She is alert and oriented to person, place, and time. 
 Skin: Skin is warm and dry. Rash noted. 
 erythema and some raised areas on the anterior chest above the level of the bra
 Murmur :  2/6 FRAN upper right 
 Electronically signed by Anastasia Cohn MD at 2017  2:24 PM PSTAnastasia Cohn MD
 - 2017  1:31 PM PSTFormatting of this note might be different from the original.
   
 
 PATIENT NAME:  Sera Weeks  
 : 1945:         AGE: 71 y.o.
  PRIMARY CARE:
 
 Chandra King MD 
 
 OUTPATIENT FOLLOW UP VISIT
 
 Date of Service: 2017 
 
 HISTORY OF PRESENT ILLNESS:
 Sera Weeks is a 71 y.o. female with a history of NSTEMI, dilated aortic root and poss
ible small aortic ulcer.  She is being seen today for follow up.
 She has not had any further chest pain.  She is doing some walking.  
 She has no shortness of breath.  She is not having any leg edema.  She does have occasional
 edema at the end of the day.
 
 She was last seen by her primary on 1/10/2017.  He changed her to Norvasc.  She had never t
aken that before.  She has now developed a rash on her chest.  There is erythema and some pu
nctate areas. She has no other areas.  None on back or abdomen.  It is pruritic.  It is bett
er with cortisone 10 - gradually getting worse. 
 
 MEDICAL, SURGICAL, AND PERSONAL HISTORY
 Past Medical, Surgical, Family, and Social History are reviewed in EPIC.
 
 CURRENT PROBLEMS
 Patient Active Problem List 
 Diagnosis 
   Pure hypercholesterolemia 
   NSTEMI (non-ST elevated myocardial infarction)  
   Acquired hypothyroidism 
   Depression 
   Anomalous right coronary artery 
 
 CURRENT MEDICATIONS
 Current Outpatient Prescriptions 
 Medication Sig Dispense Refill 
   amlodipine (NORVASC) 5 mg tablet Take 5 mg by mouth Daily.   
   aspirin 81 mg EC tablet Take 81 mg by mouth Daily.   
       
   cholecalciferol (VITAMIN D-3) 1000 UNITS TABS Take 1,000 Units by mouth Daily.   
   FLUoxetine (PROZAC) 20 mg capsule Take 20 mg by mouth Daily.   
   levothyroxine (SYNTHROID, LEVOTHROID) 100 mcg tablet Take 100 mcg by mouth every mornin
g (before breakfast).   
   Multiple Vitamins-Minerals (ADULT MULTIVITAMIN WITH MINERALS/IRON) TABS Take 1 tablet b
y mouth Daily.   
   nitroglycerin (NITROSTAT) 0.4 mg SL tablet Place 1 tablet under the tongue every 5 chioma
bryson as needed for Chest pain. 25 tablet 1 
   simvastatin (ZOCOR) 40 mg tablet Take 40 mg by mouth nightly.   
   tolterodine (DETROL LA) 4 MG 24 hr capsule Take 4 mg by mouth Daily.   
   traMADol (ULTRAM) 50 mg tablet Take 50 mg by mouth every 6 hours as needed for Pain.   
   traZODone (DESYREL) 50 mg tablet Take 50 mg by mouth nightly.   
       
 
  
  
 
 ALLERGIES
 Allergies 
 Allergen Reactions 
   Codeine Itching 
 
 ROS
 Some palpitations - no sustained tachycardia
 
 OBJECTIVE:  
 
 PHYSICAL EXAM
 /74 mmHg | Pulse 62 | Resp 16 | Ht 1.6 m (5' 3") | Wt 77.565 kg (171 lb) | BMI 30.30 
kg/m2
 .  
 
 LAB RESULTS reviewed during visit today primarily from [facility]: 
 LIPID
 Lab Results 
 Component Value Date 
  CHOL 149* 2017 
  TRIG 122 2017 
  HDL 44 2017 
  LDL 81 2017 
  CHOLHDL 3.4 2017 
 
 CHEMISTRY
 Lab Results 
 Component Value Date 
  GLU 95 2017 
   2017 
  K 3.8 2017 
   2017 
  CO2 29 2017 
  CALCIUM 8.8 2017 
  ALKPHOS 41 2017 
  AST 30 2017 
  ALT 16 2017 
  BILITOT 0.6 2017 
  CREA 0.72 2017 
  BUN 10 2017 
 
 HEMATOLOGY
 Lab Results 
 Component Value Date 
  WBC 4.4 2017 
  HGB 12.5 2017 
  HCT 36.4 2017 
  * 2017 
  
 No new results
 
 I reviewed records from visit with Dr King
 
 ASSESSMENT:  
 Rash - etiology unclear.  Not generalized, it could still be a drug eruption.  
 Recent MI no obstructive coronary disease (MINOCA)
 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
blems this late in life
 This could be spasm, however, that is unusual later in life.
 PLAN:  
 Trial of medication for rash:
 cetirizine (ZYRTEC) 10 mg tablet Take 1 tablet by mouth Daily. As needed for rash 
 
 triamcinolone (KENALOG) 0.1% cream Small amount to rash twice a day 
 If rash is not improving in 48 hours make an apt to see Dr King next week.  This could be
 a reaction to the Amlodipine- if that is the case I would switch back to the Metoprolol.  O
ther options would be Verapamil I would try 120 mg of long acting a day and warn about const
ipation.
 
 
 Follow up with me in 6 months with an echo to check the aortic root.
 Let me know if she has to take NTG
 Call if any questions 
 
 Electronically signed by: Anastasia Cohn MD [facility] 2017 
 
 Portions of this chart may have been created with Dragon voice recognition software. Occasi
onal wrong-word or   sound-alike  substitutions may have occurred due to the inherent martell
itations of voice recognition software. Please read the chart carefully and recognize, using
 context, where these substitutions have occurred. Electronically signed by Anastasia Cohn MD at 2017  2:07 PM PSTdocumented in this encounter
 
 Plan of Treatment
 Not on filedocumented as of this encounter
 
 Results
 ECHO Complete (08/10/2017 10:42 AM PDT)
 
+-------------+-------+-----------+-------------+--------------+
| Component   | Value | Ref Range | Performed   | Pathologist  |
|             |       |           | At          | Signature    |
+-------------+-------+-----------+-------------+--------------+
| LVEF-TTE    | [address]    |           | [facility]  |              |
| [facility] |       |           | [facility]    |              |
| [facility] ECHO     |       |           | [facility]     |              |
|             |       |           | [facility] -    |              |
|             |       |           | IMAGING     |              |
+-------------+-------+-----------+-------------+--------------+
 
 
 
+----------+
| Specimen |
+----------+
|          |
+----------+
 
 
 
+----------------------------------------------------------------------------------------+--
---------------+
| Narrative                                                                              | P
erformed At    |
+----------------------------------------------------------------------------------------+--
---------------+
|   This result has an attachment that is not available.  Transthoracic                  |  
 [facility]    |
| Echocardiography Report (TTE)  Demographics  Patient Name    YOU MCDANIELS        |
| SERA      Room Number                        GISSEL  Patient Number                     | M
[facility]  |
| [id number]       Date of Study               08/10/2017  Visit Number                 | -
 IMAGING       |
|     [id number]  Accession         [id number]         Referring                        |  
               |
| Physician      ANASTASIA COHN MD Number  Date of Birth   1945                    |  
               |
|       Sonographer                  [place],                                  |  
               |
 
|                                                                   [place]                   |  
               |
| Age                  71 year(s)         Interpreting                                   |  
               |
| ILANA LOPEZ MD                                                                          |  
               |
|   Cardiologist  Gender             Female               Nurse                          |  
               |
|                                          Stress Technician Procedure                   |  
               |
| Type of Study  TTE procedure: ECHO Complete. Procedure dateDate:                       |  
               |
| 08/10/2017Start: 10:10 AM Technical Quality: Adequate                                  |  
               |
| visualizationStudy Location: Echo Lab Patient Status: RoutineHeight:                   |  
               |
| 63 inchesWeight: 171 poundsBSA: 1.81 m^2BMI: 30.29 kg/m^2Rhythm:                       |  
               |
| Normal Sinus Rhythm ConclusionsSummaryNormal left ventricular cavity                   |  
               |
| with overall normal systolic function.Mild left atrial                                 |  
               |
| enlargement.LVEF is estimated at 60 %.Mild aortic valve                                |  
               |
| regurgitation.Since prior echo of 2017, no significant                             |  
               |
| changesRecommendationNo further cardiac work-up or                                     |  
               |
| therapy.Signature-----------------------------------------------------                 |  
               |
| ----------------------- Electronically signed by ILANA LOPEZ                            |  
               |
| MD(Interpreting physician) on 08/10/2017 05:41                                         |  
               |
| PM--------------------------------------------------------------------                 |  
               |
| -------- FindingsMitral ValveMild thickening of the mitral valve                       |  
               |
| leaflets without significantregurgitation or stenosis.Aortic                           |  
               |
| ValveAortic valve appears trileaflet.Mild aortic regurgitation is                      |  
               |
| noted.Diffuse thickening (sclerosis) of the aortic valve cusps without                 |  
               |
|  reducedexcursion.Tricuspid ValveStructurally normal tricuspid valve                   |  
               |
| without significant stenosis orregurgitation.Pulmonic                                  |  
               |
| ValveStructurally normal pulmonic valve without significant stenosis                   |  
               |
| orregurgitation.Left AtriumThe left atrium is mildly dilated.Left                      |  
               |
| VentricleLeft Ventricular size appears to be normal with an ejection                   |  
               |
| fractionestimated at 60 %.Right AtriumNormal right atrial size.Right                   |  
               |
| VentricleNormal right ventricular size and function.Pericardial                        |  
               |
| EffusionNo evidence of any pericardial effusion.Pleural EffusionNo                     |  
               |
 
| evident pleural effusion identified.MiscellaneousAortic root dimension                 |  
               |
|  within normal limits.No significant change in ascending aortic                        |  
               |
| diameter since prior echo of  Valves  Mitral Valve  Peak                        |  
               |
| E-Wave: 0.83 m/s Peak A-Wave: 0.58 m/s  Tissue Doppler  Septal e'                      |  
               |
| Velocity: 0.09 m/s  Aortic Valve        Area (continuity): 1.21 cm^2                   |  
               |
|       Mean Gradient: 3.32 mmHg  LVOT  Peak Velocity: 1.07 m/s LVOT                     |  
               |
| Diameter: 1.35 cm Structures  Left Atrium  LA A/P Dimension: 4.24 cm                   |  
               |
|                                 LA Area: 19.89 cm^2 LA Vol/BSA Index:                  |  
               |
| 26 mL/m^2                              LA Volume: 47.06 ml                             |  
               |
|                                                             EF                         |  
               |
| Hvlsrobmw43%  Left Ventricle  Diastolic Dimension: 5.37 cm                             |  
               |
|  Systolic Dimension: 3.67 cm Septum Diastolic: 0.9 cm PW Diastolic:                    |  
               |
| 0.9 cm EF Calculated: 55%  Miscellaneous  Aorta  Aortic Root: 3.41 cm                  |  
               |
| Ascending Aorta: 3.58 cm                                                               |  
               |
| 08/10/2017 05:41 PM                                                                    |  
               |
|----------------------------------------------------------------------------             | 
                |
|                                                                                        |  
               |
|Findings                                                                               |   
              |
|Mitral Valve                                                                            |  
               |
|Mild thickening of the mitral valve leaflets without significant                        |  
               |
|regurgitation or stenosis.                                                              |  
               |
|Aortic Valve                                                                            |  
               |
|Aortic valve appears trileaflet.                                                        |  
               |
|Mild aortic regurgitation is noted.                                                     |  
               |
|Diffuse thickening (sclerosis) of the aortic valve cusps without reduced                |  
               |
|excursion.                                                                             |   
              |
|Tricuspid Valve                                                                         |  
               |
|Structurally normal tricuspid valve without significant stenosis or                     |  
               |
|regurgitation.                                                                         |   
              |
|Pulmonic Valve                                                                          |  
               |
 
|Structurally normal pulmonic valve without significant stenosis or                      |  
               |
|regurgitation.                                                                         |   
              |
|Left Atrium                                                                             |  
               |
|The left atrium is mildly dilated.                                                      |  
               |
|Left Ventricle                                                                          |  
               |
|Left Ventricular size appears to be normal with an ejection fraction                    |  
               |
|estimated at 60 %.                                                                      |  
               |
|Right Atrium                                                                            |  
               |
|Normal right atrial size.                                                               |  
               |
|Right Ventricle                                                                         |  
               |
|Normal right ventricular size and function.                                             |  
               |
|Pericardial Effusion                                                                    |  
               |
|No evidence of any pericardial effusion.                                                |  
               |
|Pleural Effusion                                                                        |  
               |
|No evident pleural effusion identified.                                                 |  
               |
|Miscellaneous                                                                          |   
              |
|Aortic root dimension within normal limits.                                             |  
               |
|No significant change in ascending aortic diameter since prior echo of               |  
               |
|                                                                                    |  
               |
|                                                                                        |  
               |
|Valves                                                                                 |   
              |
|                                                                                        |  
               |
| Mitral Valve                                                                           |  
               |
|                                                                                        |  
               |
| Peak E-Wave: 0.83 m/s                                                                  |  
               |
| Peak A-Wave: 0.58 m/s                                                                  |  
               |
|                                                                                        |  
               |
| Tissue Doppler                                                                         |  
               |
|                                                                                        |  
               |
| Septal e' Velocity: 0.09 m/s                                                           |  
               |
 
|                                                                                        |  
               |
| Aortic Valve                                                                           |  
               |
|                                                                                        |  
               |
|       Area (continuity): 1.21 cm^2                                                     |  
               |
|       Mean Gradient: 3.32 mmHg                                                         |  
               |
|                                                                                        |  
               |
| LVOT                                                                                   |  
               |
|                                                                                        |  
               |
| Peak Velocity: 1.07 m/s                                                                |  
               |
| LVOT Diameter: 1.35 cm                                                                 |  
               |
|                                                                                        |  
               |
|Structures                                                                             |   
              |
|                                                                                        |  
               |
| Left Atrium                                                                            |  
               |
|                                                                                        |  
               |
| LA A/P Dimension: 4.24 cm                                 LA Area: 19.89 cm^2          |  
               |
| LA Vol/BSA Index: 26 mL/m^2                              LA Volume: 47.06 ml           |  
               |
|                                                                        EF [id number]% |  
               |
|                                                                                        |  
               |
| Left Ventricle                                                                         |  
               |
|                                                                                        |  
               |
| Diastolic Dimension: 5.37 cm             Systolic Dimension: 3.67 cm                   |  
               |
| Septum Diastolic: 0.9 cm                                                               |  
               |
| PW Diastolic: 0.9 cm                                                                   |  
               |
| EF Calculated: 55%                                                                     |  
               |
|                                                                                        |  
               |
| Miscellaneous                                                                          |  
               |
|                                                                                        |  
               |
| Aorta                                                                                  |  
               |
|                                                                                        |  
               |
 
| Aortic Root: 3.41 cm                                                                   |  
               |
| Ascending Aorta: 3.58 cm                                                               |  
               |
|                                                                                        |  
               |
+----------------------------------------------------------------------------------------+--
---------------+
 
 
 
+------------------------------------------------------------------------------------------+
| Procedure Note                                                                           |
+------------------------------------------------------------------------------------------+
|   Ace, Rad Results In - 2017  5:08 PM PDT  Transthoracic Echocardiography Report   |
| (TTE) Demographics Patient Name   YOU LOERA    Room Number                [facility] Patient |
|  Number [id number]     Date of Study          08/10/2017 Visit Number   [id number]     |
| Accession      [id number]      Referring Physician    ANASTASIA COHN MD Number Date of   |
| Birth  1945      Sonographer            CAROL ANGELO,                         |
|                                US Age            71 year(s)      Interpreting            |
| ILANA LOPEZ MD                                Cardiologist Gender         Female          |
|  Nurse                                Stress TechnicianProcedureType of Study TTE        |
| procedure: ECHO Complete.Procedure dateDate: 08/10/2017Start: 10:10 AMTechnical Quality: |
|  Adequate visualizationStudy Location: Echo LabPatient Status: RoutineHeight: 63         |
| inchesWeight: 171 poundsBSA: 1.81 m^2BMI: 30.29 kg/m^2Rhythm: Normal Sinus               |
| RhythmConclusionsSummaryNormal left ventricular cavity with overall normal systolic      |
| function.Mild left atrial enlargement.LVEF is estimated at 60 %.Mild aortic valve        |
| regurgitation.Since prior echo of 2017, no significant changesRecommendationNo       |
| further cardiac work-up or                                                               |
| therapy.Signature----------------------------------------------------------------------- |
| ----- Electronically signed by ILANA LOPEZ MD(Interpreting physician) on 08/10/2017 05:41 |
|                                                                                          |
| PM----------------------------------------------------------------------------FindingsMi |
| tral ValveMild thickening of the mitral valve leaflets without significantregurgitation  |
| or stenosis.Aortic ValveAortic valve appears trileaflet.Mild aortic regurgitation is     |
| noted.Diffuse thickening (sclerosis) of the aortic valve cusps without                   |
| reducedexcursion.Tricuspid ValveStructurally normal tricuspid valve without significant  |
| stenosis orregurgitation.Pulmonic ValveStructurally normal pulmonic valve without        |
| significant stenosis orregurgitation.Left AtriumThe left atrium is mildly dilated.Left   |
| VentricleLeft Ventricular size appears to be normal with an ejection fractionestimated   |
| at 60 %.Right AtriumNormal right atrial size.Right VentricleNormal right ventricular     |
| size and function.Pericardial EffusionNo evidence of any pericardial effusion.Pleural    |
| EffusionNo evident pleural effusion identified.MiscellaneousAortic root dimension within |
|  normal limits.No significant change in ascending aortic diameter since prior echo of    |
| [id number] Mitral Valve Peak E-Wave: 0.83 m/s Peak A-Wave: 0.58 m/s Tissue Doppler    |
| Septal e' Velocity: 0.09 m/s Aortic Valve     Area (continuity): 1.21 cm^2     Mean      |
| Gradient: 3.32 mmHg LVOT Peak Velocity: 1.07 m/s LVOT Diameter: 1.35 cmStructures Left   |
| Atrium LA A/P Dimension: 4.24 cm                      LA Area: 19.89 cm^2 LA Vol/BSA     |
| Index: 26 mL/m^2                    LA Volume: 47.06 ml                                  |
|                EF [id number]% Left Ventricle Diastolic Dimension: 5.37 cm               |
| Systolic Dimension: 3.67 cm Septum Diastolic: 0.9 cm PW Diastolic: 0.9 cm EF Calculated: |
|  55% Miscellaneous Aorta Aortic Root: 3.41 cm Ascending Aorta: 3.58 cm                   |
|                                                                                          |
|Conclusions                                                                              |
|Summary                                                                                  |
|Normal left ventricular cavity with overall normal systolic function.                     |
|Mild left atrial enlargement.                                                             |
|LVEF is estimated at 60 %.                                                                |
|Mild aortic valve regurgitation.                                                          |
|Since prior echo of 2017, no significant changes                                      |
 
|Recommendation                                                                           |
|No further cardiac work-up or therapy.                                                    |
|Signature                                                                                |
|----------------------------------------------------------------------------               
|
| Electronically signed by ILANA LOPEZ MD(Interpreting physician) on                        |
| 08/10/2017 05:41 PM                                                                      |
|----------------------------------------------------------------------------               
|
|                                                                                          |
|Findings                                                                                 |
|Mitral Valve                                                                              |
|Mild thickening of the mitral valve leaflets without significant                          |
|regurgitation or stenosis.                                                                |
|Aortic Valve                                                                              |
|Aortic valve appears trileaflet.                                                          |
|Mild aortic regurgitation is noted.                                                       |
|Diffuse thickening (sclerosis) of the aortic valve cusps without reduced                  |
|excursion.                                                                               |
|Tricuspid Valve                                                                           |
|Structurally normal tricuspid valve without significant stenosis or                       |
|regurgitation.                                                                           |
|Pulmonic Valve                                                                            |
|Structurally normal pulmonic valve without significant stenosis or                        |
|regurgitation.                                                                           |
|Left Atrium                                                                               |
|The left atrium is mildly dilated.                                                        |
|Left Ventricle                                                                            |
|Left Ventricular size appears to be normal with an ejection fraction                      |
|estimated at 60 %.                                                                        |
|Right Atrium                                                                              |
|Normal right atrial size.                                                                 |
|Right Ventricle                                                                           |
|Normal right ventricular size and function.                                               |
|Pericardial Effusion                                                                      |
|No evidence of any pericardial effusion.                                                  |
|Pleural Effusion                                                                          |
|No evident pleural effusion identified.                                                   |
|Miscellaneous                                                                            |
|Aortic root dimension within normal limits.                                               |
|No significant change in ascending aortic diameter since prior echo of                 |
|                                                                                      |
|                                                                                          |
|Valves                                                                                   |
|                                                                                          |
| Mitral Valve                                                                             |
|                                                                                          |
| Peak E-Wave: 0.83 m/s                                                                    |
| Peak A-Wave: 0.58 m/s                                                                    |
|                                                                                          |
| Tissue Doppler                                                                           |
|                                                                                          |
| Septal e' Velocity: 0.09 m/s                                                             |
|                                                                                          |
| Aortic Valve                                                                             |
|                                                                                          |
|     Area (continuity): 1.21 cm^2                                                         |
|     Mean Gradient: 3.32 mmHg                                                             |
|                                                                                          |
| LVOT                                                                                     |
 
|                                                                                          |
| Peak Velocity: 1.07 m/s                                                                  |
| LVOT Diameter: 1.35 cm                                                                   |
|                                                                                          |
|Structures                                                                               |
|                                                                                          |
| Left Atrium                                                                              |
|                                                                                          |
| LA A/P Dimension: 4.24 cm                      LA Area: 19.89 cm^2                       |
| LA Vol/BSA Index: 26 mL/m^2                    LA Volume: 47.06 ml                       |
|                                                EF [id number]%                           |
|                                                                                          |
| Left Ventricle                                                                           |
|                                                                                          |
| Diastolic Dimension: 5.37 cm         Systolic Dimension: 3.67 cm                         |
| Septum Diastolic: 0.9 cm                                                                 |
| PW Diastolic: 0.9 cm                                                                     |
| EF Calculated: 55%                                                                       |
|                                                                                          |
| Miscellaneous                                                                            |
|                                                                                          |
| Aorta                                                                                    |
|                                                                                          |
| Aortic Root: 3.41 cm                                                                     |
| Ascending Aorta: 3.58 cm                                                                 |
+------------------------------------------------------------------------------------------+
 
 
 
+----------------------+---------------------+--------------------+----------------+
| Performing           | Address             | City/State/Zipcode | Phone Number   |
| Organization         |                     |                    |                |
+----------------------+---------------------+--------------------+----------------+
|   [facility]     |   [address] |   [place]  |   [phone] |
| [facility]  |                     | [id number]              |                |
| - IMAGING            |                     |                    |                |
+----------------------+---------------------+--------------------+----------------+
 documented in this encounter
 
 Visit Diagnoses
 
 
+---------------------------------------------------------------------------------------+
| Diagnosis                                                                             |
+---------------------------------------------------------------------------------------+
|   Dilated aortic root (HCC) - Primary  Aortic ectasia, unspecified site               |
+---------------------------------------------------------------------------------------+
|   Heart murmur  Undiagnosed cardiac murmurs                                           |
+---------------------------------------------------------------------------------------+
|   Pure hypercholesterolemia                                                           |
+---------------------------------------------------------------------------------------+
|   NSTEMI (non-ST elevated myocardial infarction) (HCC)  Acute myocardial infarction,  |
| subendocardial infarction, episode of care unspecified                                |
+---------------------------------------------------------------------------------------+
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
+---------------------------------------------------------------------------------------+
|   Rash  Rash and other nonspecific skin eruption                                      |
+---------------------------------------------------------------------------------------+
 documented in this encounter

## 2020-07-23 NOTE — XMS
Encounter Summary
  Created on: 2020
 
 Parrish Weeksgabe Montero
 External Reference #: [id number]
 : [birth year]
 Sex: Female
 
 Demographics
 
 
+-----------------------+----------------------+
| Address               | [address]         |
|                       | [place]  [address] |
+-----------------------+----------------------+
| Home Phone            | [phone]      |
+-----------------------+----------------------+
| Preferred Language    | Unknown              |
+-----------------------+----------------------+
| Marital Status        |               |
+-----------------------+----------------------+
| Sabianist Affiliation | Unknown              |
+-----------------------+----------------------+
| Race                  | Unknown              |
+-----------------------+----------------------+
| Ethnic Group          | Unknown              |
+-----------------------+----------------------+
 
 
 Author
 
 
+--------------+--------------------------------------------+
| Author       | [facility]  |
|              | [facility]                                |
+--------------+--------------------------------------------+
| Organization | [facility]  |
|              | [facility]                                |
+--------------+--------------------------------------------+
| Address      | Unknown                                    |
+--------------+--------------------------------------------+
| Phone        | Unavailable                                |
+--------------+--------------------------------------------+
 
 
 
 Support
 
 
+-------------------+--------------+---------+-----------------+
| Name              | Relationship | Address | Phone           |
+-------------------+--------------+---------+-----------------+
| Melissa Lau | ECON         | Unknown | [phone] |
+-------------------+--------------+---------+-----------------+
| Mike Lau   | ECON         | Unknown | [phone] |
+-------------------+--------------+---------+-----------------+
| Damaris Coyle     | ECON         | Unknown | [phone] |
+-------------------+--------------+---------+-----------------+
 
 
 
 
 Care Team Providers
 
 
+-----------------------+------+-----------------+
| Care Team Member Name | Role | Phone           |
+-----------------------+------+-----------------+
| Chandra King MD | PCP  | [phone] |
+-----------------------+------+-----------------+
 
 
 
 Reason for Referral
 Diagnostic/Screening (Routine)
 
+--------+--------+-----------+--------------+--------------+---------------+
| Status | Reason | Specialty | Diagnoses /  | Referred By  | Referred To   |
|        |        |           | Procedures   | Contact      | Contact       |
+--------+--------+-----------+--------------+--------------+---------------+
| Closed |        | Radiology |   Diagnoses  |   [place],  |   [facility]    |
|        |        |           |  Dilated     | Anastasia CASTILLO MD   | [address]  |
|        |        |           | aortic root  | [address] |  [place], |
|        |        |           | (HCC)  Heart |  [place]   |  [place]           |
|        |        |           |  [place]      | [place]    | [address]    |
|        |        |           | Procedures   | [address]        | Phone:        |
|        |        |           | ECHO         | Phone:       | [phone]  |
|        |        |           | Complete  [place] | [phone] |  Fax:         |
|        |        |           |  ECHO HEART  |   Fax:       | [phone]  |
|        |        |           | XTHORACIC,CO | [phone] |               |
|        |        |           | MPLETE W     |              |               |
|        |        |           | DOPPLER  [place]  |              |               |
|        |        |           | ECHO HEART   |              |               |
|        |        |           | XTHORACIC,CO |              |               |
|        |        |           | MPLETE, W/O  |              |               |
|        |        |           | DOPPLER      |              |               |
+--------+--------+-----------+--------------+--------------+---------------+
 
 
 
 
 Reason for Visit
 Diagnostic/Screening (Routine)
 
+--------+--------+-----------+--------------+--------------+---------------+
| Status | Reason | Specialty | Diagnoses /  | Referred By  | Referred To   |
|        |        |           | Procedures   | Contact      | Contact       |
+--------+--------+-----------+--------------+--------------+---------------+
| Closed |        | Radiology |   Diagnoses  |   [place],  |   [facility]    |
|        |        |           |  Dilated     | Anastasia CASTILLO MD   | [address]  |
|        |        |           | aortic root  | [address] |  [place] |
|        |        |           | (HCC)  Heart |  [place]  [place]   |  [place]           |
|        |        |           |  murmur      | [place]    | [address]    |
|        |        |           | Procedures   | [address]        | Phone:        |
|        |        |           | ECHO         | Phone:       | [phone]  |
|        |        |           | Complete  [place] | [phone] |  Fax:         |
|        |        |           |  ECHO HEART  |   Fax:       | [phone]  |
|        |        |           | XTHORACIC,CO | [phone] |               |
|        |        |           | MPLETE W     |              |               |
 
|        |        |           | DOPPLER  [place]  |              |               |
|        |        |           | ECHO HEART   |              |               |
|        |        |           | XTHORACIC,CO |              |               |
|        |        |           | MPLETE, W/O  |              |               |
|        |        |           | DOPPLER      |              |               |
+--------+--------+-----------+--------------+--------------+---------------+
 
 
 
 
 Encounter Details
 
 
+--------+-----------+----------------------+----------------------+----------------------+
| Date   | Type      | Department           | Care Team            | Description          |
+--------+-----------+----------------------+----------------------+----------------------+
| 08/10/ | Hospital  |   [facility] |   Anastasia Cohn,  | Dilated aortic root  |
| 2017   | Encounter |  [facility] E[facility]  [address] | MD  [address]  | ([facility]); Heart murmur  |
|        |           |  [address]  [place]       |  [place]     |                      |
|        |           | [place] [address] | [address]  [phone]  |                      |
|        |           |   [phone]       |  [phone] (Fax)  |                      |
|        |           |                      |  Sendy Campos |                      |
|        |           |                      |  [place], Technologist     |                      |
|        |           |                      | [place], [place]      |                      |
|        |           |                      | [address]                |                      |
+--------+-----------+----------------------+----------------------+----------------------+
 
 
 
 Social History
 
 
+---------------+-------+-----------+--------+---------------------+
| Tobacco Use   | Types | Packs/Day | Years  | Date                |
|               |       |           | Used   |                     |
+---------------+-------+-----------+--------+---------------------+
| Former Smoker |       |           |        | Started: 1992 |
+---------------+-------+-----------+--------+---------------------+
 
 
 
+-------------+-------------+---------+----------+
| Alcohol Use | Drinks/Week | oz/Week | Comments |
+-------------+-------------+---------+----------+
| No          |             |         |          |
+-------------+-------------+---------+----------+
 
 
 
+------------------+---------------+
| Sex Assigned at  | Date Recorded |
| Birth            |               |
+------------------+---------------+
| Not on file      |               |
+------------------+---------------+
 documented as of this encounter
 
 Medications at Time of Discharge
 
 
 
+----------------------+----------------------+-----------+---------+----------+----------+
| Medication           | Sig                  | Dispensed | Refills | Start    | End Date |
|                      |                      |           |         | Date     |          |
+----------------------+----------------------+-----------+---------+----------+----------+
|   amLODIPine         | Take 5 mg by mouth   |           | 0       |          |          |
| (NORVASC) 5 mg       | Daily.               |           |         |          |          |
| tablet               |                      |           |         |          |          |
+----------------------+----------------------+-----------+---------+----------+----------+
|   aspirin 81 mg EC   | Take 81 mg by mouth  |           | 0       |          |          |
| tablet               | Daily.               |           |         |          |          |
+----------------------+----------------------+-----------+---------+----------+----------+
|   cetirizine         | Take 1 tablet by     |   30      | 2       | 20 |          |
| (ZYRTEC) 10 mg       | mouth Daily. As      | tablet    |         | 17       |          |
| tablet               | needed for rash      |           |         |          |          |
+----------------------+----------------------+-----------+---------+----------+----------+
|   cholecalciferol    | Take 1,000 Units by  |           | 0       |          |          |
| (VITAMIN D-3) 1000   | mouth Daily.         |           |         |          |          |
| UNITS TABS           |                      |           |         |          |          |
+----------------------+----------------------+-----------+---------+----------+----------+
|   FLUoxetine         | Take 20 mg by mouth  |           | 0       |          |          |
| (PROZAC) 20 mg       | Daily.               |           |         |          |          |
| capsule              |                      |           |         |          |          |
+----------------------+----------------------+-----------+---------+----------+----------+
|   levothyroxine      | Take 100 mcg by      |           | 0       |          |          |
| (SYNTHROID,          | mouth every morning  |           |         |          |          |
| LEVOTHROID) 100 mcg  | (before breakfast).  |           |         |          |          |
| tablet               |                      |           |         |          |          |
+----------------------+----------------------+-----------+---------+----------+----------+
|   Multiple           | Take 1 tablet by     |           | 0       |          |          |
| Vitamins-Minerals    | mouth Daily.         |           |         |          |          |
| (ADULT MULTIVITAMIN  |                      |           |         |          |          |
| WITH MINERALS/IRON)  |                      |           |         |          |          |
| TABS                 |                      |           |         |          |          |
+----------------------+----------------------+-----------+---------+----------+----------+
|   nitroglycerin      | Place 1 tablet under |   25      | 1       | 20 |          |
| (NITROSTAT) 0.4 mg   |  the tongue every 5  | tablet    |         | 17       |          |
| SL tablet            | minutes as needed    |           |         |          |          |
|                      | for Chest pain.      |           |         |          |          |
+----------------------+----------------------+-----------+---------+----------+----------+
|   simvastatin        | Take 40 mg by mouth  |           | 0       |          |          |
| (ZOCOR) 40 mg tablet | nightly.             |           |         |          |          |
+----------------------+----------------------+-----------+---------+----------+----------+
|   tolterodine        | Take 4 mg by mouth   |           | 0       |          |          |
| (DETROL LA) 4 MG 24  | Daily.               |           |         |          |          |
| hr capsule           |                      |           |         |          |          |
+----------------------+----------------------+-----------+---------+----------+----------+
|   traMADol (ULTRAM)  | Take 50 mg by mouth  |           | 0       |          |          |
| 50 mg tablet         | every 6 hours as     |           |         |          |          |
|                      | needed for Pain.     |           |         |          |          |
+----------------------+----------------------+-----------+---------+----------+----------+
|   traZODone          | Take 50 mg by mouth  |           | 0       |          |          |
| (DESYREL) 50 mg      | nightly.             |           |         |          |          |
| tablet               |                      |           |         |          |          |
+----------------------+----------------------+-----------+---------+----------+----------+
|   triamcinolone      | Small amount to rash |   45 g    | 1       | 20 |          |
| (KENALOG) 0.1% cream |  twice a day         |           |         | 17       |          |
+----------------------+----------------------+-----------+---------+----------+----------+
 documented as of this encounter
 
 Plan of Treatment
 
 Not on filedocumented as of this encounter
 
 Procedures
 
 
+----------------+--------+-------------+----------------------+----------------------+
| Procedure Name | Priori | Date/Time   | Associated Diagnosis | Comments             |
|                | ty     |             |                      |                      |
+----------------+--------+-------------+----------------------+----------------------+
| ECHO COMPLETE  | Routin | 08/10/2017  |   Dilated aortic     |   Results for this   |
|                | e      | 10:42 AM    | root (HCC)  Heart    | procedure are in the |
|                |        | PDT         | murmur               |  results section.    |
+----------------+--------+-------------+----------------------+----------------------+
 documented in this encounter
 
 Results
 ECHO Complete (08/10/2017 10:42 AM PDT)
 
+-------------+-------+-----------+-------------+--------------+
| Component   | Value | Ref Range | Performed   | Pathologist  |
|             |       |           | At          | Signature    |
+-------------+-------+-----------+-------------+--------------+
| LVEF-TTE    | [facility]    |           | [facility]  |              |
| [facility] |       |           | [facility]    |              |
| [facility]     |       |           | [facility]     |              |
|             |       |           | [facility]    |              |
|             |       |           | IMAGING     |              |
+-------------+-------+-----------+-------------+--------------+
 
 
 
+----------+
| Specimen |
+----------+
|          |
+----------+
 
 
 
+----------------------------------------------------------------------------------------+--
---------------+
| Narrative                                                                              | P
erformed At    |
+----------------------------------------------------------------------------------------+--
---------------+
|   This result has an attachment that is not available.  Transthoracic                  |  
 [facility]    |
| Echocardiography Report (TTE)  Demographics  Patient Name    YOU [facility]CD[facility]ELS        |
| [facility]      Room Number                        GISSEL  Patient Number                     | [facility]  |
| [id number]       Date of Study               08/10/2017  Visit Number                 | -
 IMAGING       |
|     [id number]  Accession         [id number]         Referring                        |  
               |
| Physician      ANASTASIA COHN MD Number  Date of Birth   1945                    |  
               |
|       Sonographer                  CAROL ANG[place],                                  |  
               |
|                                                                   [place]                   |  
 
               |
| Age                  71 year(s)         Interpreting                                   |  
               |
| ILANA LOPEZ MD                                                                          |  
               |
|   Cardiologist  Gender             Female               Nurse                          |  
               |
|                                          Stress Technician Procedure                   |  
               |
| Type of Study  TTE procedure: ECHO Complete. Procedure dateDate:                       |  
               |
| 08/10/2017Start: 10:10 AM Technical Quality: Adequate                                  |  
               |
| visualizationStudy Location: Echo Lab Patient Status: RoutineHeight:                   |  
               |
| 63 inchesWeight: 171 poundsBSA: 1.81 m^2BMI: 30.29 kg/m^2Rhythm:                       |  
               |
| Normal Sinus Rhythm ConclusionsSummaryNormal left ventricular cavity                   |  
               |
| with overall normal systolic function.Mild left atrial                                 |  
               |
| enlargement.LVEF is estimated at 60 %.Mild aortic valve                                |  
               |
| regurgitation.Since prior echo of 2017, no significant                             |  
               |
| changesRecommendationNo further cardiac work-up or                                     |  
               |
| therapy.Signature-----------------------------------------------------                 |  
               |
| ----------------------- Electronically signed by ILANA LOPEZ                            |  
               |
| MD(Interpreting physician) on 08/10/2017 05:41                                         |  
               |
| PM--------------------------------------------------------------------                 |  
               |
| -------- FindingsMitral ValveMild thickening of the mitral valve                       |  
               |
| leaflets without significantregurgitation or stenosis.Aortic                           |  
               |
| ValveAortic valve appears trileaflet.Mild aortic regurgitation is                      |  
               |
| noted.Diffuse thickening (sclerosis) of the aortic valve cusps without                 |  
               |
|  reducedexcursion.Tricuspid ValveStructurally normal tricuspid valve                   |  
               |
| without significant stenosis orregurgitation.Pulmonic                                  |  
               |
| ValveStructurally normal pulmonic valve without significant stenosis                   |  
               |
| orregurgitation.Left AtriumThe left atrium is mildly dilated.Left                      |  
               |
| VentricleLeft Ventricular size appears to be normal with an ejection                   |  
               |
| fractionestimated at 60 %.Right AtriumNormal right atrial size.Right                   |  
               |
| VentricleNormal right ventricular size and function.Pericardial                        |  
               |
| EffusionNo evidence of any pericardial effusion.Pleural EffusionNo                     |  
               |
| evident pleural effusion identified.MiscellaneousAortic root dimension                 |  
 
               |
|  within normal limits.No significant change in ascending aortic                        |  
               |
| diameter since prior echo of  Valves  Mitral Valve  Peak                        |  
               |
| E-Wave: 0.83 m/s Peak A-Wave: 0.58 m/s  Tissue Doppler  Septal e'                      |  
               |
| Velocity: 0.09 m/s  Aortic Valve        Area (continuity): 1.21 cm^2                   |  
               |
|       Mean Gradient: 3.32 mmHg  LVOT  Peak Velocity: 1.07 m/s LVOT                     |  
               |
| Diameter: 1.35 cm Structures  Left Atrium  LA A/P Dimension: 4.24 cm                   |  
               |
|                                 LA Area: 19.89 cm^2 LA Vol/BSA Index:                  |  
               |
| 26 mL/m^2                              LA Volume: 47.06 ml                             |  
               |
|                                                             EF                         |  
               |
| [id number]%  Left Ventricle  Diastolic Dimension: 5.37 cm                             |  
               |
|  Systolic Dimension: 3.67 cm Septum Diastolic: 0.9 cm PW Diastolic:                    |  
               |
| 0.9 cm EF Calculated: 55%  Miscellaneous  Aorta  Aortic Root: 3.41 cm                  |  
               |
| Ascending Aorta: 3.58 cm                                                               |  
               |
| 08/10/2017 05:41 PM                                                                    |  
               |
|----------------------------------------------------------------------------             | 
                |
|                                                                                        |  
               |
|Findings                                                                               |   
              |
|Mitral Valve                                                                            |  
               |
|Mild thickening of the mitral valve leaflets without significant                        |  
               |
|regurgitation or stenosis.                                                              |  
               |
|Aortic Valve                                                                            |  
               |
|Aortic valve appears trileaflet.                                                        |  
               |
|Mild aortic regurgitation is noted.                                                     |  
               |
|Diffuse thickening (sclerosis) of the aortic valve cusps without reduced                |  
               |
|excursion.                                                                             |   
              |
|Tricuspid Valve                                                                         |  
               |
|Structurally normal tricuspid valve without significant stenosis or                     |  
               |
|regurgitation.                                                                         |   
              |
|Pulmonic Valve                                                                          |  
               |
|Structurally normal pulmonic valve without significant stenosis or                      |  
 
               |
|regurgitation.                                                                         |   
              |
|Left Atrium                                                                             |  
               |
|The left atrium is mildly dilated.                                                      |  
               |
|Left Ventricle                                                                          |  
               |
|Left Ventricular size appears to be normal with an ejection fraction                    |  
               |
|estimated at 60 %.                                                                      |  
               |
|Right Atrium                                                                            |  
               |
|Normal right atrial size.                                                               |  
               |
|Right Ventricle                                                                         |  
               |
|Normal right ventricular size and function.                                             |  
               |
|Pericardial Effusion                                                                    |  
               |
|No evidence of any pericardial effusion.                                                |  
               |
|Pleural Effusion                                                                        |  
               |
|No evident pleural effusion identified.                                                 |  
               |
|Miscellaneous                                                                          |   
              |
|Aortic root dimension within normal limits.                                             |  
               |
|No significant change in ascending aortic diameter since prior echo of               |  
               |
|                                                                                    |  
               |
|                                                                                        |  
               |
|Valves                                                                                 |   
              |
|                                                                                        |  
               |
| Mitral Valve                                                                           |  
               |
|                                                                                        |  
               |
| Peak E-Wave: 0.83 m/s                                                                  |  
               |
| Peak A-Wave: 0.58 m/s                                                                  |  
               |
|                                                                                        |  
               |
| Tissue Doppler                                                                         |  
               |
|                                                                                        |  
               |
| Septal e' Velocity: 0.09 m/s                                                           |  
               |
|                                                                                        |  
 
               |
| Aortic Valve                                                                           |  
               |
|                                                                                        |  
               |
|       Area (continuity): 1.21 cm^2                                                     |  
               |
|       Mean Gradient: 3.32 mmHg                                                         |  
               |
|                                                                                        |  
               |
| LVOT                                                                                   |  
               |
|                                                                                        |  
               |
| Peak Velocity: 1.07 m/s                                                                |  
               |
| LVOT Diameter: 1.35 cm                                                                 |  
               |
|                                                                                        |  
               |
|Structures                                                                             |   
              |
|                                                                                        |  
               |
| Left Atrium                                                                            |  
               |
|                                                                                        |  
               |
| LA A/P Dimension: 4.24 cm                                 LA Area: 19.89 cm^2          |  
               |
| LA Vol/BSA Index: 26 mL/m^2                              LA Volume: 47.06 ml           |  
               |
|                                                                        EF [id number]% |  
               |
|                                                                                        |  
               |
| Left Ventricle                                                                         |  
               |
|                                                                                        |  
               |
| Diastolic Dimension: 5.37 cm             Systolic Dimension: 3.67 cm                   |  
               |
| Septum Diastolic: 0.9 cm                                                               |  
               |
| PW Diastolic: 0.9 cm                                                                   |  
               |
| EF Calculated: 55%                                                                     |  
               |
|                                                                                        |  
               |
| Miscellaneous                                                                          |  
               |
|                                                                                        |  
               |
| Aorta                                                                                  |  
               |
|                                                                                        |  
               |
| Aortic Root: 3.41 cm                                                                   |  
 
               |
| Ascending Aorta: 3.58 cm                                                               |  
               |
|                                                                                        |  
               |
+----------------------------------------------------------------------------------------+--
---------------+
 
 
 
+------------------------------------------------------------------------------------------+
| Procedure Note                                                                           |
+------------------------------------------------------------------------------------------+
|   Ace, Rad Results In - 2017  5:08 PM PDT  Transthoracic Echocardiography Report   |
| (TTE) Demographics Patient Name   YOU LOERA    Room Number                [facility] Patient |
|  Number [id number]     Date of Study          08/10/2017 Visit Number   [id number]     |
| Accession      [id number]      Referring Physician    ANASTASIA COHN MD Number Date of   |
| Birth  1945      Sonographer            CAROL ANGELO,                         |
|                                US Age            71 year(s)      Interpreting            |
| ILANA LOPEZ MD                                Cardiologist Gender         Female          |
|  Nurse                                Stress TechnicianProcedureType of Study TTE        |
| procedure: ECHO Complete.Procedure dateDate: 08/10/2017Start: 10:10 AMTechnical Quality: |
|  Adequate visualizationStudy Location: Echo LabPatient Status: RoutineHeight: 63         |
| inchesWeight: 171 poundsBSA: 1.81 m^2BMI: 30.29 kg/m^2Rhythm: Normal Sinus               |
| RhythmConclusionsSummaryNormal left ventricular cavity with overall normal systolic      |
| function.Mild left atrial enlargement.LVEF is estimated at 60 %.Mild aortic valve        |
| regurgitation.Since prior echo of 2017, no significant changesRecommendationNo       |
| further cardiac work-up or                                                               |
| therapy.Signature----------------------------------------------------------------------- |
| ----- Electronically signed by ILANA LOPEZ MD(Interpreting physician) on 08/10/2017 05:41 |
|                                                                                          |
| PM----------------------------------------------------------------------------FindingsMi |
| tral ValveMild thickening of the mitral valve leaflets without significantregurgitation  |
| or stenosis.Aortic ValveAortic valve appears trileaflet.Mild aortic regurgitation is     |
| noted.Diffuse thickening (sclerosis) of the aortic valve cusps without                   |
| reducedexcursion.Tricuspid ValveStructurally normal tricuspid valve without significant  |
| stenosis orregurgitation.Pulmonic ValveStructurally normal pulmonic valve without        |
| significant stenosis orregurgitation.Left AtriumThe left atrium is mildly dilated.Left   |
| VentricleLeft Ventricular size appears to be normal with an ejection fractionestimated   |
| at 60 %.Right AtriumNormal right atrial size.Right VentricleNormal right ventricular     |
| size and function.Pericardial EffusionNo evidence of any pericardial effusion.Pleural    |
| EffusionNo evident pleural effusion identified.MiscellaneousAortic root dimension within |
|  normal limits.No significant change in ascending aortic diameter since prior echo of    |
| [id number] Mitral Valve Peak E-Wave: 0.83 m/s Peak A-Wave: 0.58 m/s Tissue Doppler    |
| Septal e' Velocity: 0.09 m/s Aortic Valve     Area (continuity): 1.21 cm^2     Mean      |
| Gradient: 3.32 mmHg LVOT Peak Velocity: 1.07 m/s LVOT Diameter: 1.35 cmStructures Left   |
| Atrium LA A/P Dimension: 4.24 cm                      LA Area: 19.89 cm^2 LA Vol/BSA     |
| Index: 26 mL/m^2                    LA Volume: 47.06 ml                                  |
|                EF [id number]% Left Ventricle Diastolic Dimension: 5.37 cm               |
| Systolic Dimension: 3.67 cm Septum Diastolic: 0.9 cm PW Diastolic: 0.9 cm EF Calculated: |
|  55% Miscellaneous Aorta Aortic Root: 3.41 cm Ascending Aorta: 3.58 cm                   |
|                                                                                          |
|Conclusions                                                                              |
|Summary                                                                                  |
|Normal left ventricular cavity with overall normal systolic function.                     |
|Mild left atrial enlargement.                                                             |
|LVEF is estimated at 60 %.                                                                |
|Mild aortic valve regurgitation.                                                          |
|Since prior echo of 2017, no significant changes                                      |
|Recommendation                                                                           |
 
|No further cardiac work-up or therapy.                                                    |
|Signature                                                                                |
|----------------------------------------------------------------------------               
|
| Electronically signed by ILANA LOPEZ MD(Interpreting physician) on                        |
| 08/10/2017 05:41 PM                                                                      |
|----------------------------------------------------------------------------               
|
|                                                                                          |
|Findings                                                                                 |
|Mitral Valve                                                                              |
|Mild thickening of the mitral valve leaflets without significant                          |
|regurgitation or stenosis.                                                                |
|Aortic Valve                                                                              |
|Aortic valve appears trileaflet.                                                          |
|Mild aortic regurgitation is noted.                                                       |
|Diffuse thickening (sclerosis) of the aortic valve cusps without reduced                  |
|excursion.                                                                               |
|Tricuspid Valve                                                                           |
|Structurally normal tricuspid valve without significant stenosis or                       |
|regurgitation.                                                                           |
|Pulmonic Valve                                                                            |
|Structurally normal pulmonic valve without significant stenosis or                        |
|regurgitation.                                                                           |
|Left Atrium                                                                               |
|The left atrium is mildly dilated.                                                        |
|Left Ventricle                                                                            |
|Left Ventricular size appears to be normal with an ejection fraction                      |
|estimated at 60 %.                                                                        |
|Right Atrium                                                                              |
|Normal right atrial size.                                                                 |
|Right Ventricle                                                                           |
|Normal right ventricular size and function.                                               |
|Pericardial Effusion                                                                      |
|No evidence of any pericardial effusion.                                                  |
|Pleural Effusion                                                                          |
|No evident pleural effusion identified.                                                   |
|Miscellaneous                                                                            |
|Aortic root dimension within normal limits.                                               |
|No significant change in ascending aortic diameter since prior echo of                                                                                       |
|                                                                                          |
|Valves                                                                                   |
|                                                                                          |
| Mitral Valve                                                                             |
|                                                                                          |
| Peak E-Wave: 0.83 m/s                                                                    |
| Peak A-Wave: 0.58 m/s                                                                    |
|                                                                                          |
| Tissue Doppler                                                                           |
|                                                                                          |
| Septal e' Velocity: 0.09 m/s                                                             |
|                                                                                          |
| Aortic Valve                                                                             |
|                                                                                          |
|     Area (continuity): 1.21 cm^2                                                         |
|     Mean Gradient: 3.32 mmHg                                                             |
|                                                                                          |
| LVOT                                                                                     |
|                                                                                          |
 
| Peak Velocity: 1.07 m/s                                                                  |
| LVOT Diameter: 1.35 cm                                                                   |
|                                                                                          |
|Structures                                                                               |
|                                                                                          |
| Left Atrium                                                                              |
|                                                                                          |
| LA A/P Dimension: 4.24 cm                      LA Area: 19.89 cm^2                       |
| LA Vol/BSA Index: 26 mL/m^2                    LA Volume: 47.06 ml                       |
|                                                EF [id number]%                           |
|                                                                                          |
| Left Ventricle                                                                           |
|                                                                                          |
| Diastolic Dimension: 5.37 cm         Systolic Dimension: 3.67 cm                         |
| Septum Diastolic: 0.9 cm                                                                 |
| PW Diastolic: 0.9 cm                                                                     |
| EF Calculated: 55%                                                                       |
|                                                                                          |
| Miscellaneous                                                                            |
|                                                                                          |
| Aorta                                                                                    |
|                                                                                          |
| Aortic Root: 3.41 cm                                                                     |
| Ascending Aorta: 3.58 cm                                                                 |
+------------------------------------------------------------------------------------------+
 
 
 
+----------------------+---------------------+--------------------+----------------+
| Performing           | Address             | City/State/Zipcode | Phone Number   |
| Organization         |                     |                    |                |
+----------------------+---------------------+--------------------+----------------+
|   [facility]     |   [address] |   [place]  |   [phone] |
| [facility]  |                     | [id number]              |                |
| - IMAGING            |                     |                    |                |
+----------------------+---------------------+--------------------+----------------+
 documented in this encounter
 
 Visit Diagnoses
 
 
+---------------------------------------------------------------+
| Diagnosis                                                     |
+---------------------------------------------------------------+
|   Dilated aortic root (HCC)  Aortic ectasia, unspecified site |
+---------------------------------------------------------------+
|   Heart murmur  Undiagnosed cardiac murmurs                   |
+---------------------------------------------------------------+
 documented in this encounter

## 2020-07-23 NOTE — XMS
Encounter Summary
  Created on: 2020
 
 Parrish Weeksgabe Montero
 External Reference #: [id number]
 : [birth year]
 Sex: Female
 
 Demographics
 
 
+-----------------------+----------------------+
| Address               | [address]         |
|                       | [place]  [address] |
+-----------------------+----------------------+
| Home Phone            | [phone]      |
+-----------------------+----------------------+
| Preferred Language    | Unknown              |
+-----------------------+----------------------+
| Marital Status        |               |
+-----------------------+----------------------+
| Buddhist Affiliation | Unknown              |
+-----------------------+----------------------+
| Race                  | Unknown              |
+-----------------------+----------------------+
| Ethnic Group          | Unknown              |
+-----------------------+----------------------+
 
 
 Author
 
 
+--------------+--------------------------------------------+
| Author       | [facility]  |
|              | and [facility]                                |
+--------------+--------------------------------------------+
| Organization | [facility]  |
|              | and [facility]                                |
+--------------+--------------------------------------------+
| Address      | Unknown                                    |
+--------------+--------------------------------------------+
| Phone        | Unavailable                                |
+--------------+--------------------------------------------+
 
 
 
 Support
 
 
+-------------------+--------------+---------+-----------------+
| Name              | Relationship | Address | Phone           |
+-------------------+--------------+---------+-----------------+
| Melissa Lau | ECON         | Unknown | [phone] |
+-------------------+--------------+---------+-----------------+
| Mike Lau   | ECON         | Unknown | [phone] |
+-------------------+--------------+---------+-----------------+
| Damaris Coyle     | ECON         | Unknown | [phone] |
+-------------------+--------------+---------+-----------------+
 
 
 
 
 Care Team Providers
 
 
+-----------------------+------+-----------------+
| Care Team Member Name | Role | Phone           |
+-----------------------+------+-----------------+
| Chandra King MD | PCP  | [phone] |
+-----------------------+------+-----------------+
 
 
 
 Reason for Visit
 Auth/Cert
 
+--------+--------+-----------+--------------+--------------+--------------+
| Status | Reason | Specialty | Diagnoses /  | Referred By  | Referred To  |
|        |        |           | Procedures   | Contact      | Contact      |
+--------+--------+-----------+--------------+--------------+--------------+
|        |        |           |   Diagnoses  |              |              |
|        |        |           |  Chest pain  |              |              |
|        |        |           |  chest pain  |              |              |
|        |        |           |  NSTEMI      |              |              |
|        |        |           | Procedures   |              |              |
|        |        |           | CV COR ANGIO |              |              |
+--------+--------+-----------+--------------+--------------+--------------+
 
 
 
 
 Encounter Details
 
 
+--------+---------+----------------------+----------------------+--------------+
| Date   | Type    | Department           | Care Team            | Description  |
+--------+---------+----------------------+----------------------+--------------+
| / | Surgery |   [facility] |   Anastasia Brasher,  | CV Cor Angio |
| 2017   |         |  [facility] | MD  [address]  |              |
|        |         |   [address]       |  [place]     |              |
|        |         | [place]      | [id number] [phone]  |              |
|        |         | [address]           |  [phone] (Fax)  |              |
|        |         | [phone]         |                      |              |
+--------+---------+----------------------+----------------------+--------------+
 
 
 
 Social History
 
 
+---------------+-------+-----------+--------+---------------------+
| Tobacco Use   | Types | Packs/Day | Years  | Date                |
|               |       |           | Used   |                     |
+---------------+-------+-----------+--------+---------------------+
| Former Smoker |       |           |        | Started: 1992 |
+---------------+-------+-----------+--------+---------------------+
 
 
 
 
+-------------+-------------+---------+----------+
| Alcohol Use | Drinks/Week | oz/Week | Comments |
+-------------+-------------+---------+----------+
| No          |             |         |          |
+-------------+-------------+---------+----------+
 
 
 
+------------------+---------------+
| Sex Assigned at  | Date Recorded |
| Birth            |               |
+------------------+---------------+
| Not on file      |               |
+------------------+---------------+
 documented as of this encounter
 
 Last Filed Vital Signs
 
 
+-------------------+----------------------+----------------------+----------+
| Vital Sign        | Reading              | Time Taken           | Comments |
+-------------------+----------------------+----------------------+----------+
| Blood Pressure    | 115/71               | 2017  3:10 PM  |          |
|                   |                      | PST                  |          |
+-------------------+----------------------+----------------------+----------+
| Pulse             | 61                   | 2017  3:10 PM  |          |
|                   |                      | PST                  |          |
+-------------------+----------------------+----------------------+----------+
| Temperature       | 36.2   C (97.2   F)  | 2017 12:25 PM  |          |
|                   |                      | PST                  |          |
+-------------------+----------------------+----------------------+----------+
| Respiratory Rate  | 18                   | 2017  3:10 PM  |          |
|                   |                      | PST                  |          |
+-------------------+----------------------+----------------------+----------+
| Oxygen Saturation | 99%                  | 2017  3:10 PM  |          |
|                   |                      | PST                  |          |
+-------------------+----------------------+----------------------+----------+
| Inhaled Oxygen    | -                    | -                    |          |
| Concentration     |                      |                      |          |
+-------------------+----------------------+----------------------+----------+
| Weight            | 74.3 kg (163 lb 12.8 | 2017 12:25 PM  |          |
|                   |  oz)                 | PST                  |          |
+-------------------+----------------------+----------------------+----------+
| Height            | 160 cm (5' 3")       | 2017 12:25 PM  |          |
|                   |                      | PST                  |          |
+-------------------+----------------------+----------------------+----------+
| Body Mass Index   | 29.13                | 2017 12:25 PM  |          |
|                   |                      | PST                  |          |
+-------------------+----------------------+----------------------+----------+
 documented in this encounter
 
 Discharge Summaries
 Anastasia Brasher MD - 2017  8:54 AM PSTFormatting of this note might be different fr
om the original.
 
 
 DISCHARGE SUMMARY
 
 PATIENT NAME/:  Sera Weeks, (1945)
 MEDICAL RECORD NUMBER: [id number]
 
 DATE OF ADMISSION:  2017
 DATE OF DISCHARGE:  1/3/2017
 DISCHARGING PHYSICIAN: Anastasia Brasher MD
   
 
 ADMITTING DIAGNOSIS:  NSTEMI (non-ST elevated myocardial infarction) 
 PRIMARY CARE PROVIDER: Chandra King MD
 
 DISPOSITION:   Discharge to home
 
 BRIEF HOSPITAL COURSE:
    Please see the history and physical at the time of admission.  Briefly, Ms. Weeks is a [age] y.o. female who was admitted on 2017 with NSTEMI (non-ST elevated myocardial infarctio
n) .
 She was asymptomatic after admission.  
 She had recently experienced a headache behind her right eye that was unusual for her.
 We first obtained a Head CT.  That was unremarkable.
 
 She was then taken to cath:
 FINDINGS:
 Hemodynamics:
 Left ventricular end-diastolic pressure (LVEDP) was 14 mm Hg.  
 There was no gradient across the aortic valve.
 
 Left Ventriculogram: Not performed 
 Left main coronary artery: Normal 
 Left anterior descending coronary artery: Large vessel. No 
 significant disease.
 Circumflex coronary artery: Dominant. Gives off 2 marginal and 
 the PDA. No significant disease. 10% in the AV groove.
 Right coronary artery: Small non dominant vessel anomalous 
 origin from the left coronary cusp. Minimal luminal 
 irregularity.
 
 Aortic Root: 
 Enlarged 
 No AI 
 No obvious dissection 
 Possible small aortic ulceration 
 CONCLUSIONS:
 1. Dilated Aortic Root
 2. No AI
 3. Normal Left Main
 4. Normal LAD
 5. Dominant LCX. 10% luminal irregularity in AV groove. Gives 
 off the PDA
 6. Non dominant RCA. Mild plaque. Arises anomalously from the 
 left cusp
 
 A CT angio of the chest was done and there was no evidence of dissection.
 Her aorta was minimally enlarged.
 An echo was done and her LV function was normal.  Her aortic root was at the upper limit of
 normal.
 
 In the first 24 hours she had some PVC and run of VT 3 beats.  She then had no arrhythmia.
 
 She felt well and denied pain.
 
 She was ambulated without problem.  Her blood pressure was normal and she could not tolerat
e a lot of medication.  
 
 Condition at discharge:
 Ambulating normally
 Pain free
 No arrhythmia
 
 HOSPITAL PROBLEMS: 
 Principal Problem:
   NSTEMI (non-ST elevated myocardial infarction) 
 Active Problems:
   Pure hypercholesterolemia
   Acquired hypothyroidism
   Depression
   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
 
 MASTER PROBLEM LIST: 
 Patient Active Problem List 
  Diagnosis Date Noted POA 
   Pure hypercholesterolemia 2017 Unknown 
   NSTEMI (non-ST elevated myocardial infarction)  2017 Unknown 
   Acquired hypothyroidism 2017 Unknown 
   Depression 2017 Unknown 
 
 VITALS:
 Temp: 36.2 C (97.2 F)   BP: 104/58 mmHg       Pulse: 56      Resp: 16     SpO2: 95 %
 
 WEIGHT:
 Today's Weight: 74.6 kg (164 lb 7.4 oz)
   Admit  Weight: 74.3 kg (163 lb 12.8 oz)
 
 BRIEF EXAM TODAY:
  General Appearance - alert, in no distress
  Heart -  regular rate and rhythm, S1 and S2 normal, no murmur, rub, or gallop.
  Lungs - clear to auscultation bilaterally
  Musc/Skel -  moves all extremities
  Extremities - no cyanosis.  no significant edema
  Neurologic - no focal deficits
  no hematoma
 
 CONSULTATION:
 Dietitian
 
 DIAGNOSTIC STUDIES: 
 Imaging: 
 Cath:
 Informed consent was obtained from the patient, and a time-out was performed to verify the 
patient's identification and planned procedure. Please refer to the computer log entry for
m for precise details. The patient's right radial artery was sterilely prepped. Arterial
 access was achieved with micropuncture kit. Patient was then given intravenous heparin 
as well as intra-arterial nitroglycerin and Nicardipine through the sheath. A 5 French JR4
 and a 3 DRC did not fit the coronary. A pullback across the valve was used to assess if t
here was a gradient across the aortic valve A JL 3.5 did not fit the left system nor did a
 LBU 3 did not fit. A JL3 sat close to the anomalous RCA and also opacified the Left. A 
pig tail was used to perform an aortic root. . 
 The TR band occluder device was utilized to achieve successful hemostasis of the radial art
mik. There were no immediate complications. 
 
  Estimated blood loss was: 10 cc.
  Fluro Time: 8.9 Min 
  Total Reference Air Kerma: 443.55 mGy
  Contrast: 105 mls of Omnipaque 350
 
 
 FINDINGS:
 Hemodynamics:
 Left ventricular end-diastolic pressure (LVEDP) was 14 mm Hg. There was no gradient a
cross the aortic valve.
 
 Left Ventriculogram: Not performed 
 Left main coronary artery: Normal 
 Left anterior descending coronary artery: Large vessel. No significant disease.
 Circumflex coronary artery: Dominant. Gives off 2 marginal and the PDA. No significan
t disease. 10% in the AV groove.
 Right coronary artery: Small non dominant vessel anomalous origin from the left coronary 
cusp. Minimal luminal irregularity.
 
 Aortic Root: 
 Enlarged 
 No AI 
 No obvious dissection 
 Possible small aortic ulceration 
 CONCLUSIONS:
 1. Dilated Aortic Root
 2. No AI
 3. Normal Left Main
 4. Normal LAD
 5. Dominant LCX. 10% luminal irregularity in AV groove. Gives off the PDA
 6. Non dominant RCA. Mild plaque. Arises anomalously from the left cusp
 Echo 17
 Procedure date
 Date: 2017Start: 10:22 AM
 
 Technical Quality: Adequate visualizationStudy Location: ICU
 
 Patient Status: Routine
 Height: 62.99 inchesWeight: 163 poundsBSA: 1.77 m^2BMI: 28.88 kg/m^2
 HR: 54 bpm
 
 Conclusions
 Summary
 Normal Left Ventricular contractility was noted.
 Left ventricle is normal in size and function. Ejection fraction is
 estimated at 62%.
 Trace AI
 Dilated left atrium.
 aortic root at the upper limits of normal in size
 normal IVC
 
 ECG: 
 
 Head CT:
  UNENHANCED AND ENHANCED HEAD CT 2017 2:23 PM
 
 CLINICAL HISTORY: pain behind right eye  
 
 COMPARISON: None available
 
 TECHNIQUE: Axial images are performed through the head both before and after the
 uneventful intravenous administration of 80 cc Omnipaque 350 contrast. Coronal
 and sagittal reformations are also performed. 
 
 FINDINGS: There is mild cerebral atrophy and mild, patchy hypoattenuation
 
 involving the deep and periventricular cerebral white matter. Gray-white
 differentiation is maintained. The ventricles, brainstem and cerebellum are
 unremarkable. There is no mass effect, abnormal enhancement, evidence of
 intracranial hemorrhage or extra-axial fluid collection/mass. Bilateral
 prosthetic ocular lenses are present. The orbital contents are otherwise
 symmetric and unremarkable, without visible mass lesion or proptosis. There is
 mild mucous membrane thickening within bilateral ethmoid air cells. Imaged
 paranasal sinuses are otherwise well aerated, along with the middle ear cavities
 and imaged after air cells. Asymmetric degeneration of the left greater than
 right temporomandibular joints is noted.
 
 There is calcified plaque within the cavernous segments of the internal carotid
 arteries. Major intracranial vessels and dural sinuses otherwise appear patent
 and grossly unremarkable. No conclusive aneurysm is visible.
 
 IMPRESSION - 
 1. NO VISIBLE MASS, ANEURYSM OR OTHER POTENTIAL ETIOLOGY FOR RETRO-ORBITAL
 PAIN.
 
 2. MILD CEREBRAL ATROPHY AND PROBABLE CHRONIC, MICROVASCULAR ISCHEMIC CHANGE OF
 THE CEREBRAL WHITE MATTER.
 
 3. MILD ETHMOID SINUS DISEASE.
 
 4. ASYMMETRIC DEGENERATION OF THE TEMPOROMANDIBULAR JOINTS.
 
 Dictated and Signed by: Андрей Payton MD 
 Electronically signed: 2017 2:36 PM
  
  
 
 CT angio of chest and abdomen:
 HISTORY: Rule out dissection.
 
 COMPARISON: None.
 
 PROTOCOL: Axial images of the chest and abdomen were obtained before and after
 uneventful administration of 85 mL Omnipaque 350. Coronal and sagittal
 reformations were acquired.
 
 FINDINGS:
 There is moderate atherosclerosis of the aorta extending into the branches. The
 ascending thoracic aorta is mildly ectatic and measures 3.8 cm. There is no
 evidence for dissection. The celiac artery, SMA, and TWYLA are patent. The
 bilateral renal arteries are normal. There is mild to moderate atherosclerosis
 of the iliac arteries.
 
 Neck base is normal. The heart is of normal size. The pulmonary arteries are
 unremarkable. SVC is normal. No enlarged lymph nodes are seen in the
 mediastinum, kourtney, or axilla. Trachea and esophagus are normal. There is mild
 dependent atelectasis of the bilateral lungs.
 
 The liver demonstrates normal parenchyma. The gallbladder is normal. Biliary
 ducts are unremarkable. The spleen is unremarkable. The pancreas demonstrates
 normal parenchyma and a normal pancreatic duct. Adrenal glands are normal. The
 right kidney and visualized ureter are normal. The left kidney and visualized
 ureter are normal. Stomach is normal. Imaged small bowel and colon show no acute
 findings. There is no significant abnormality in the portal veins, mesenteric
 veins, or systemic veins. No enlarged lymph nodes are visualized within the
 omentum or retroperitoneum. There is no evidence for ascites or free air.
 
 
 Body wall soft tissue structures are normal. There is mild S-shaped curvature of
 the thoracolumbar spine. Mild to moderate spondylosis is present.
 
 IMPRESSION -
 No evidence for aortic dissection.
 
 A preliminary report was sent by [facility] with no significant
 discrepancy.
 
 Selected Labs: 
 
 Recent Labs
 Lab 17
 0349 17
 0447 17
 1244 
 WBC 4.4  --  4.2 
 HGB 12.5  --  13.9 
 HCT 36.4  --  41.2 
 *  --  129* 
  144 141 
 K 3.8 3.4* 3.7 
 BUN 10 8 13 
 CREA 0.72 0.62 0.54* 
 GLU 95 82 85 
 CALCIUM 8.8 8.4 9.1 
 
 Recent Labs
 Lab 17
 0447 
 HDL 44 
 LDL 81 
 TRIG 122 
 
 Recent Results (from the past 24 hour(s)) 
 ECHO Complete 
  Collection Time: 17 11:00 
 Result Value Ref Range 
  LVEF-TTE TRANSTHORACIC ECHO 62  
 D-Dimer 
  Collection Time: 17 12:33 
 Result Value Ref Range 
  D-DIMER, QUANTITATIVE 0.61 (H) <=0.50 ug/ml 
 CBC no Differential 
  Collection Time: 17  3:49 
 Result Value Ref Range 
  WBC 4.4 4.0-11.0 K/uL 
  RBC 3.85 3.70-5.20 M/uL 
  Hgb 12.5 11.5-16.0 g/dL 
  Hct 36.4 34.0-47.0 % 
  MCV 94.6 83.0-101.0 fL 
  MCH 32.5 28.0-35.0 pg 
  MCHC 34.4 32.0-36.0 g/dL 
  RDW-CV 13.1 <15.0 % 
  Platelet Count 125 (L) 140-440 K/uL 
  MPV 9.6 fL 
 Basic Metabolic Panel 
  Collection Time: 17  3:49 
 Result Value Ref Range 
 
   136-149 mmol/L 
  K 3.8 3.5-5.1 mmol/L 
    mmol/L 
  CO2 29 24-31 mmol/L 
  ANION GAP 7 3-16 mmol/L 
  GLUCOSE 95  mg/dL 
  BUN 10 7-18 mg/dL 
  Creatinine, Serum/Plasma 0.72 0.60-1.30 mg/dL 
  eGFR if not AFRICAN AMERICAN >60 >=60 mL/min/1.73m2 
  CALCIUM 8.8 8.3-10.5 mg/dL 
  BUN/CREA 13.9  
 D-Dimer 
  Collection Time: 17  3:49 
 Result Value Ref Range 
  D-DIMER, QUANTITATIVE 0.49 <=0.50 ug/ml 
 Troponin I 
  Collection Time: 17  3:50 
 Result Value Ref Range 
  Troponin I 1.28 (HH) <0.06 ng/mL 
 
 MEDS:
  
 Discharge Medications 
  
 New Medications  
    Details 
  metoprolol tartrate 25 mg tablet 
  Take 1 tablet by mouth 2 times daily. 
 aka:  LOPRESSOR 
  
  nitroglycerin 0.4 mg SL tablet 
  Place 1 tablet under the tongue every 5 minutes as needed for Chest pain. 
 aka:  NITROSTAT 
  
  
 Unchanged Medications  
    Details 
  adult multivitamin with minerals/iron Tabs 
  Take 1 tablet by mouth Daily. 
  
  aspirin 81 mg EC tablet 
  Take 81 mg by mouth Daily. 
  
  cholecalciferol 1000 UNITS Tabs 
  Take 1,000 Units by mouth Daily. 
 aka:  VITAMIN D-3 
  
  FLUoxetine 20 mg capsule 
  Take 20 mg by mouth Daily. 
 aka:  PROzac 
  
  levothyroxine 100 mcg tablet 
  Take 100 mcg by mouth every morning (before breakfast). 
 aka:  SYNTHROID, LEVOTHROID 
  
  simvastatin 40 mg tablet 
  Take 40 mg by mouth nightly. 
 aka:  ZOCOR 
  
  tolterodine 4 MG 24 hr capsule 
 
  Take 4 mg by mouth Daily. 
 aka:  DETROL LA 
  
  
 Discontinued Medications  
    
  tocopherol 400 units capsule 
 aka:  VITAMIN E 
  
  
 
 PATIENT INSTRUCTIONS:
 
 ACTIVITY:
 For 24 Hours:  Do NOT drive
 
 For 2 days:  Do NOT lift more than 1 pound with the affected arm.
           Keep the wrist dry, clean.  No dishwashing, hot tub.
                      Avoid wrist movement such as bike riding, golf.
 
 For 5 days:  Avoid vigorous exercise that uses the affected arm.
 Do not lift greater than 10 pounds until cleared 
 
 DIET:
 cardiac diet
 
 FOLLOW-UP:
 Follow up with Dr Brasher week of  follow up with Dr King in a week.
 
 Time spent on discharge planning: less than 30 minutes
 
 Electronically signed by: 
 Anastasia Brasher MD on 1/3/2017 at 8:54Electronically signed by Anastasia Brasher MD at 01/0
3/2017  9:49 AM PSTdocumented in this encounter
 
 Discharge Instructions
 Instructions Anastasia Brasher MD - 2017For 24 Hours:  Do NOT drive
 
 For 2 days:  Do NOT lift more than 1 pound with the affected arm.
           Keep the wrist dry, clean.  No dishwashing, hot tub.
                      Avoid wrist movement such as bike riding, golf.
 
 For 5 days:  Avoid vigorous exercise that uses the affected arm.
 Do not lift greater than 10 pounds until cleared 
 
 Pain
  It is common to be sore for 1 to 2 days at the catheter insertion site.  You may take ac
etaminophen (Tylenol) for pain relief.  Follow the dosing instructions on the package.
 
  Take your regular aspirin as prescribed.
 
  Do NOT take other products that contain aspirin.  Do NOT take other anti-inflammatory pr
oducts such as ibuprofen (Advil, Motrin) or naproxen (Aleve, Naprosyn).  They may cause incr
eased bleeding.
 
  If you have severe pain at the catheter site, call your provider.
 
 Site Care
  You may remove the dressing or bandage the day after your procedure.
 
 
  Keep site clean and dry.  Clean the site gently with mild soap and water.  Re-apply a cl
[place] Band-Aid, if needed.
 
  It is normal to have a small bruise or lump at the insertion site.
 
  You may shower the day after your procedure, but avoid tub baths, hot tubs or swimming f
or the next 2 days.
 
  Inspect the site everyday for infection.  (see " When to Call for Help" on the next page
)
 
 Fluids
  Drink an extra 2 quarts of water, in addition to your normal fluid intake in the next 24
 hours.
  This helps the body eliminate the contrast dye given to you during your procedure.
  If you are on a fluid restriction follow orders from your physician.
 
 When to Call for Help
 
 If you have bleeding at the site, apply pressure to the site with clean fingers for 10 chioma
bryson.  
 Phone [phone]
 
  If the bleeding does not stop in 10 minutes, or there is a large amount of bleeding, heri
l 9-1-1.  Continue to apply pressure over the site until help arrives.
 
  If the bleeding stops, sit quietly for 2 hours while you keep the affected wrist straigh
t.  Call the cardiologist who did your procedure as soon as possible.
 
 Other Concerns
 Call the cardiologist who did your procedure if you have:
 
 Any of these Signs of Infection:
  Redness
  Fever higher than 101.5 degrees F or 38.6 degrees C
 
  Change in the bruise or lump.
  Numbness in your arm or wrist.
  Severe pain that is not relieved by Tylenol.
 
 Follow-up Care
  Continue your prescribed medications unless instructed otherwise.
 
  Follow-up with your primary health care provider and cardiologist after your procedure, 
as instructed.
 
  If you have questions or concerns about your cardiac catheterization procedure, call the
 number below.  
 
 
 documented in this encounter
 
 Medications at Time of Discharge
 
 
+----------------------+----------------------+-----------+---------+----------+-----------+
| Medication           | Sig                  | Dispensed | Refills | Start    | End Date  |
|                      |                      |           |         | Date     |           |
+----------------------+----------------------+-----------+---------+----------+-----------+
|   aspirin 81 mg EC   | Take 81 mg by mouth  |           | 0       |          |           |
 
| tablet               | Daily.               |           |         |          |           |
+----------------------+----------------------+-----------+---------+----------+-----------+
|   cholecalciferol    | Take 1,000 Units by  |           | 0       |          |           |
| (VITAMIN D-3) 1000   | mouth Daily.         |           |         |          |           |
| UNITS TABS           |                      |           |         |          |           |
+----------------------+----------------------+-----------+---------+----------+-----------+
|   FLUoxetine         | Take 20 mg by mouth  |           | 0       |          |           |
| (PROZAC) 20 mg       | Daily.               |           |         |          |           |
| capsule              |                      |           |         |          |           |
+----------------------+----------------------+-----------+---------+----------+-----------+
|   levothyroxine      | Take 100 mcg by      |           | 0       |          |           |
| (SYNTHROID,          | mouth every morning  |           |         |          |           |
| LEVOTHROID) 100 mcg  | (before breakfast).  |           |         |          |           |
| tablet               |                      |           |         |          |           |
+----------------------+----------------------+-----------+---------+----------+-----------+
|   Multiple           | Take 1 tablet by     |           | 0       |          |           |
| Vitamins-Minerals    | mouth Daily.         |           |         |          |           |
| (ADULT MULTIVITAMIN  |                      |           |         |          |           |
| WITH MINERALS/IRON)  |                      |           |         |          |           |
| TABS                 |                      |           |         |          |           |
+----------------------+----------------------+-----------+---------+----------+-----------+
|   nitroglycerin      | Place 1 tablet under |   25      | 1       | 20 |           |
| (NITROSTAT) 0.4 mg   |  the tongue every 5  | tablet    |         | 17       |           |
| SL tablet            | minutes as needed    |           |         |          |           |
|                      | for Chest pain.      |           |         |          |           |
+----------------------+----------------------+-----------+---------+----------+-----------+
|   simvastatin        | Take 40 mg by mouth  |           | 0       |          |           |
| (ZOCOR) 40 mg tablet | nightly.             |           |         |          |           |
+----------------------+----------------------+-----------+---------+----------+-----------+
|   tolterodine        | Take 4 mg by mouth   |           | 0       |          |           |
| (DETROL LA) 4 MG 24  | Daily.               |           |         |          |           |
| hr capsule           |                      |           |         |          |           |
+----------------------+----------------------+-----------+---------+----------+-----------+
|   metoprolol         | Take 1 tablet by     |   180     | 3       | 20 |  |
| tartrate (LOPRESSOR) | mouth 2 times daily. | tablet    |         | 17       | 7         |
|  25 mg tablet        |                      |           |         |          |           |
+----------------------+----------------------+-----------+---------+----------+-----------+
 documented as of this encounter
 
 Progress Notes
 Karla Hinojosa PharmD - 2017 10:31 AM PSTSera Weeks was admitted for NSTEMI a
nd discharged home today (1/3/2017)
 
 Taught AVS education to patient. Education was focused on new medications and/or changed me
dications. I explained indication, how to take, possible side effects, when to contact physi
yehuda, and monitor parameters.
 
 The patient was encouraged to make a follow-up appointment with PCP and cardiologist. 
 
 The patient verbalized understanding of the above and all questions were answered. Pharmaci
st will follow-up with patient in one to two business days. Patient was provided with a izzy
nciled discharge medication list as part of their AVS instructions. Encouraged patient to sh
are medication list with healthcare providers and keep list current.
 
 Karla Hinojosa PHARMD 1/3/2017 10:31
 Electronically signed by Karla Hinojosa PharmD at 2017 10:31 AM Sharifa Martell P harmD - 2017  5:25 PM PSTFormatting of this note might be different from the original.
 PHARMACY SERVICES: ADMISSION MEDICATION REVIEW
 
 Sera Weeks is a 71 y.o. female admitted on 17.
 
 
 Patient is a reliable historian. 
 
 Location of Patient when reviewed:
 [] ED   [x] Medical Floor
 
 Patient  s prior to admit medication and over the counter (OTC) medications/herbal supplem
ents list obtained from:
 
 [x] Verbal interview (patient is able to recall drug name, strength, frequency, etc.) 
 [] Patient/family member provided a complete current medication list or bottles
 [] MAR from SNF facility: 
 [] Doctor's office:
 [] Pharmacy    list names: 
 [x] [employer] insurance reported information
 [] Care Everywhere 
 [] Other sources: 
 
 Vaccines up to date?  
  Yes No Unsure 
 Influenza [x] [] [] 
 Pneumococcal [x] [] [] 
 Tdap [x] [] [] 
 Shingles [x] [] [] 
 
 Noted medications discrepancies or medication-related issues: 
 
  Medication added: 
 Medication: Prior to Admission Sig: 
 Cholecalciferol 1000 u 1 tab daily 
 Tocopherol 400 u 1 cap daily 
 
 Medication review performed and electronically signed by Joe Gong, Pharm Tech 
017 17:15
 Sharifa Gibbons 2017 17:24
 Electronically signed by Sharifa Posada PharmD at 2017  5:25 PM Anastasia Jameson MD - 2017  9:40 AM PSTFormatting of this note might be different from the original.
   
 
 PATIENT NAME:  Sera Weeks  
 : 1945:         AGE: 71 y.o.
  PRIMARY CARE:
 Chandra King MD 
 
 INPATIENT FOLLOW UP VISIT
 
 Date of Service: 17
 
 HISTORY OF PRESENT ILLNESS:
 Sera Weeks is a 71 y.o. female with a history of NSTEMI yesterday.  She is being seen tod
 for follow up.
 
 She feels well.  No chest pain or shortness of breath.  She had some asymptomatic NSVT over
 night.
 
 MEDICAL, SURGICAL, AND PERSONAL HISTORY
 Past Medical, Surgical, Family, and Social History are reviewed in EPIC.
 
 CURRENT PROBLEMS
 Patient Active Problem List 
 
 Diagnosis 
   Pure hypercholesterolemia 
   NSTEMI (non-ST elevated myocardial infarction)  
   Acquired hypothyroidism 
   Depression 
 
 CURRENT MEDICATIONS
 Current Facility-Administered Medications 
 Medication Dose Route Frequency Provider Last Rate Last Dose 
   acetaminophen (TYLENOL) tablet 650 mg  650 mg Oral Q6H PRN Anastasia Brasher MD   650 mg
 at 17 4974 
   aluminum & magnesium hydroxide-simethicone (MAALOX PLUS REGULAR STRENGTH) 200-200-20 mg
/5 mL suspension 30 mL  30 mL Oral Q4H PRN Anastasia Brasher MD     
   aspirin EC tablet 81 mg  81 mg Oral Daily Anastasia Brasher MD   81 mg at 17 0842 
   atorvaSTATin (LIPITOR) tablet 40 mg  40 mg Oral Nightly Anastasia Brasher MD   40 mg at 
17 
   FLUoxetine (PROzac) capsule 20 mg  20 mg Oral Daily Anastasia Brasher MD   20 mg at 01/0
 1727 
   levothyroxine (SYNTHROID, LEVOTHROID) tablet 100 mcg  100 mcg Oral QAM AC Anastasia Anguiano ms, MD   100 mcg at 17 0632 
   lidocaine 1%-EPINEPHrine 1:100,000 injection 5 mL  5 mL Infiltration Once PRN Anastasia clay MD     
   magnesium sulfate 2 g/50 mL IVPB 2 g  2 g Intravenous Once Anastasia Brasher MD 25 mL/hr
 at 17 0842 2 g at 17 0842 
   metoprolol tartrate (LOPRESSOR) tablet 50 mg  50 mg Oral BID Anastasia Brasher MD   50 m
g at 17 0842 
   nitroglycerin (NITROSTAT) SL tablet 0.4 mg  0.4 mg Sublingual Q5 Min PRN Anastasia santos MD     
   ondansetron (ZOFRAN) injection 4-8 mg  4-8 mg Intravenous Q6H PRN Anastasia Brasher MD  
   
  
 
 ALLERGIES
 Allergies 
 Allergen Reactions 
   Codeine Itching 
 
 ROS
 Otherwise negative
 OBJECTIVE:  
 
 PHYSICAL EXAM
 BP 87/54 mmHg | Pulse 60 | Temp(Src) 36.4 C (97.5 F) (Oral) | Resp 18 | Ht 1.6 m (5' 3"
) | Wt 75.3 kg (166 lb 0.1 oz) | BMI 29.41 kg/m2 | SpO2 93%
   General appearance: no acute distress.
   Eyes: no icterus. Lids normal
   Mouth: no cyanosis.
   Neck: No lymphadenopathy in the neck or supraclavicular area. Good carotid upstroke.   No
 bruits. No JVD
   Lungs: CTA    
   Heart: normal sounds  no murmur
   Abdomen: soft.
   Ext: No CCE in upper or lower extremities
   Neuro: Awake and oriented x3
 
 ECG:  
 
 LAB RESULTS: 
 LIPID
 Will add on 
 
 
 CHEMISTRY
 Lab Results 
 Component Value Date 
  GLU 82 2017 
   2017 
  K 3.4* 2017 
  * 2017 
  CO2 25 2017 
  CALCIUM 8.4 2017 
  ALKPHOS 41 2017 
  AST 30 2017 
  ALT 16 2017 
  BILITOT 0.6 2017 
  CREA 0.62 2017 
  BUN 8 2017 
 
 HEMATOLOGY
 Lab Results 
 Component Value Date 
  WBC 4.2 2017 
  HGB 13.9 2017 
  HCT 41.2 2017 
  * 2017 
 
 ASSESSMENT:  
 NSTEMI
 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
 Small ascending aortic aneurysm without dissection
 Non sustained VT in the first 24 hours of MI
 
 PLAN:  
 Echo for LV function and to assess infarct location
 Replace K
 Add magnesium
 Beta blocker
 
 Electronically signed by: Anastasia Brasher MD [facility] 2017 
 
 Portions of this chart may have been created with Dragon voice recognition software. Occasi
onal wrong-word or   sound-alike  substitutions may have occurred due to the inherent martell
itations of voice recognition software. Please read the chart carefully and recognize, using
 context, where these substitutions have occurred.
 Electronically signed by Anastasia Brasher MD at 2017  9:49 AM PSTAnastasia Brasher MD
 - 2017  2:47 PM PSTHead CT is negative.
 The risk and benefits of appropriate heart  catheterization with possible balloons, stents,
 or other appropriate techniques as indicated were discussed.  It was explained that the pos
sible complications include but are not limited to death, stroke, heart arrest,heart attack,
 emergency surgery, blood vessel injury possibly requiring surgical repair, site infection, 
acute kidney failure, and/or reactions to iodine contrast agent or sedatives. Long term risk
s include re-blockage (restenosis) and stent thrombosis (clotting).    The patient's questio
ns were answered, and the patient wishes to proceed. Patient is appropriate for conscious se
dation.
 Types of stents were discussed and DAPHNEY would be most appropriate for this patient.Davidi
shelly signed by Anastasia Brasher MD at 2017  2:48 PM PSTdocumented in this encounter
 
 H&P Notes
 Anastasia Brasher MD - 2017 12:59 PM PSTFormatting of this note might be different fr
om the original.
                           Admission H&P
 
 
 Referring Provider: ER [facility] 
 Primary Care: Dr Knig
 
 Reason for Admission: Unstable Angina 
 
 2017
 Sera Weeks is a 71 y.o. female
 
 History of Present Illness:
 Very pleasant 70 y/o female who awoke with jaw pain at 3:30 in the morning.  She has never 
had anything like this before.  It started on the right and then spread to both sides of her
 jaw and her tongue.
 She was seen in the ER at [facility].  She was given ASA and NTG  First set of enzymes were
 negative and second set Trop was 0.11.  She was going to get Heparin.  This was not started
.
 She is asymptomatic.  It was present for about 10 hours.
 She was a little dizzy this am.  She did not have dyspnea or diaphoresis.
 
 Past Medical History:
 Dyslipidemia
 Hypothyroidism
 Depression
 Urinary incontinence
 
 Current Medications:
 Home Medications 
 Medication Sig 
   aspirin 81 mg EC tablet Take 81 mg by mouth Daily. 
   FLUoxetine (PROZAC) 20 mg capsule Take 20 mg by mouth Daily. 
   levothyroxine (SYNTHROID, LEVOTHROID) 100 mcg tablet Take 100 mcg by mouth every mornin
g (before breakfast). 
   Multiple Vitamins-Minerals (ADULT MULTIVITAMIN WITH MINERALS/IRON) TABS Take 1 tablet b
y mouth Daily. 
   simvastatin (ZOCOR) 40 mg tablet Take 40 mg by mouth nightly. 
   tolterodine (DETROL LA) 4 MG 24 hr capsule Take 4 mg by mouth Daily. 
 
 Allergies:
 
 Allergies 
 Allergen Reactions 
   Codeine Itching 
 
 Family History:
 
 No family history on file.
 
 Social History:
 
 Social History 
 
 Social History 
   Marital Status: N/A 
   Spouse Name: N/A 
   Number of Children: N/A 
   Years of Education: N/A 
 
 Occupational History 
   Not on file. 
 
 
 Social History Main Topics 
   Smoking status: Former Smoker 
   Start date: 1992 
   Smokeless tobacco: Not on file 
   Alcohol Use: No 
   Drug Use: Not on file 
   Sexual Activity: Not on file 
 
 Other Topics Concern 
   Not on file 
 
 Social History Narrative 
   No narrative on file 
 
 Review of Systems:
 For the last weeks she has had pain behind her right eye and in her right eye brow.
 Last episode was today
 Some tingling in right hand
 Hands are cool - no changes of red, white or blue
 Review of Systems 
 Constitutional: Negative for fever, chills, weight loss, malaise/fatigue and diaphoresis. 
 HENT: Negative for congestion, ear discharge, ear pain, hearing loss, nosebleeds and tinnit
us.  
 Eyes: Positive for pain. Negative for blurred vision, double vision, photophobia, discharge
 and redness. 
 Respiratory: Negative for cough, hemoptysis, sputum production, shortness of breath and whe
ezing.  
 Cardiovascular: Positive for palpitations and leg swelling. Negative for orthopnea, claudic
ation and PND. 
 Gastrointestinal: Negative for heartburn, nausea, vomiting, blood in stool and melena. 
 Genitourinary: Negative for dysuria, urgency, frequency, hematuria and flank pain. 
 Musculoskeletal: Negative for myalgias, back pain and neck pain. 
 Skin: Negative for itching and rash. 
 Neurological: Positive for dizziness and headaches. Negative for tingling, tremors, sensory
 change, speech change, focal weakness, seizures, loss of consciousness and weakness. 
 Endo/Heme/Allergies: Negative for environmental allergies and polydipsia. Does not bruise/b
leed easily. 
 Psychiatric/Behavioral: Positive for depression. Negative for suicidal ideas, hallucination
s, memory loss and substance abuse. The patient is not nervous/anxious and does not have ins
omnia.  
 
 Reviewed 10 systems, all were negative except as listed in HPI
 
 Physical Exam:
 /64 mmHg | Pulse 67 | Temp(Src) 36.2 C (97.2 F) | Resp 15 | Ht 1.6 m (5' 3") | Wt
 74.3 kg (163 lb 12.8 oz) | BMI 29.02 kg/m2 | SpO2 96%
 Physical Exam 
 Constitutional: She is oriented to person, place, and time. She appears well-developed and 
well-nourished. No distress. 
 HENT: 
 Head: Normocephalic and atraumatic. 
 Eyes: Conjunctivae and EOM are normal. Pupils are equal, round, and reactive to light. No s
cleral icterus. 
 Neck: Normal range of motion. No JVD present. No tracheal deviation present. No thyromegaly
 present. 
 Cardiovascular: Normal rate, regular rhythm, normal heart sounds and intact distal pulses. 
 Exam reveals no gallop and no friction rub.  
 No murmur heard.
 Pulmonary/Chest: Effort normal and breath sounds normal. No stridor. No respiratory distres
s. She has no wheezes. She has no rales. She exhibits no tenderness. 
 
 Abdominal: Soft. Bowel sounds are normal. She exhibits no distension and no mass. There is 
no tenderness. There is no rebound and no guarding. 
 Musculoskeletal: She exhibits no edema or tenderness. 
 Lymphadenopathy: 
   She has no cervical adenopathy. 
 Neurological: She is alert and oriented to person, place, and time. No cranial nerve defici
t. 
 Skin: Skin is warm and dry. She is not diaphoretic. 
 Psychiatric: She has a normal mood and affect. Her behavior is normal. 
 
 Test Results:
 ECG reviewed by me from ER and here Incomplete RBBB otherwise normal
 
 Recent Results (from the past 24 hour(s)) 
 Troponin I 
 Result Value Ref Range 
  Troponin I 2.01 (HH) <0.06 ng/mL 
 CBC no Differential 
 Result Value Ref Range 
  WBC 4.2 4.0-11.0 K/uL 
  RBC 4.33 3.70-5.20 M/uL 
  Hgb 13.9 11.5-16.0 g/dL 
  Hct 41.2 34.0-47.0 % 
  MCV 95.0 83.0-101.0 fL 
  MCH 32.2 28.0-35.0 pg 
  MCHC 33.9 32.0-36.0 g/dL 
  RDW-CV 12.8 <15.0 % 
  Platelet Count 129 (L) 140-440 K/uL 
  MPV 10.1 fL 
 Basic Metabolic Panel 
 Result Value Ref Range 
   136-149 mmol/L 
  K 3.7 3.5-5.1 mmol/L 
    mmol/L 
  CO2 30 24-31 mmol/L 
  ANION GAP 6 3-16 mmol/L 
  GLUCOSE 85  mg/dL 
  BUN 13 7-18 mg/dL 
  Creatinine, Serum/Plasma 0.54 (L) 0.60-1.30 mg/dL 
  eGFR if not AFRICAN AMERICAN >60 >=60 mL/min/1.73m2 
  CALCIUM 9.1 8.3-10.5 mg/dL 
  BUN/CREA 24.1  
 PTT 
 Result Value Ref Range 
  PTT 32 22-36 seconds 
 ECG 12 lead 
 Result Value Ref Range 
  VENTRICULAR RATE EKG 57 BPM 
  ATRIAL RATE 57 BPM 
  P-R INTERVAL 146 ms 
  QRS DURATION 78 ms 
  Q-T INTERVAL 450 ms 
  Q-T INTERVAL (CORRECTED) 438 ms 
  P WAVE AXIS 34 degrees 
  QRS AXIS -2 degrees 
  T AXIS 56 degrees 
  INTERPRETATION TEXT   
   Sinus bradycardia
 Low voltage QRS
 Nonspecific ST abnormality
 
 Borderline ECG
 No previous ECGs available
 Confirmed by ANASTASIA BRASHER MD ([id number]) on 2017 1:32:01 PM
  
  
 Impression/Plan:
 
 1. NSTEMI
 2. Pain behind right eye - etiology unknown
 3. Dyslipidemia
 4. Strong + family history of CAD
 Plan:
 CT of head with contrast r/o intracranial pathology
 Then proceed with cath
 As heparin was never started at [facility] I will hold off pending the head CT
 
 Electronically signed by Anastasia Brasher MD at 2017  1:40 PM PSTdocumented in this e
ncounter
 
 Procedure Notes
 Anastasia Brasher MD - 2017  5:10 PM PSTAssociated Order(s): CV CARDIAC PROCEDUREForm
atting of this note might be different from the original.
 Sera Weeks is a 71 y.o. female patient.
 1. Acquired hypothyroidism  
 2. NSTEMI (non-ST elevated myocardial infarction) (HCC)  
 3. Pure hypercholesterolemia  
 
 Past Medical History 
 Diagnosis Date 
   Hyperlipidemia  
   Hypothyroidism  
   Incontinence  
   Depression  
 
 Blood pressure 124/78, pulse 63, temperature 36.2 C (97.2 F), resp. rate 19, height 1.6
 m (5' 3"), weight 74.3 kg (163 lb 12.8 oz), SpO2 90 %.
 
 CV Cardiac Procedure
 Date/Time: 2017 17:10
 Performed by: ANASTASIA BRASHER
 Authorized by: ANASTASIA BRASHER
 Consent: Verbal consent obtained. Written consent obtained.
 Risks and benefits: risks, benefits and alternatives were discussed
 Consent given by: patient
 Patient understanding: patient states understanding of the procedure being performed
 Patient consent: the patient's understanding of the procedure matches consent given
 Procedure consent: procedure consent matches procedure scheduled
 Relevant documents: relevant documents present and verified
 Test results: test results available and properly labeled
 Site marked: the operative site was marked
 Imaging studies: imaging studies available
 Required items: required blood products, implants, devices, and special equipment available
 Patient identity confirmed: verbally with patient and arm band
 Time out: Immediately prior to procedure a "time out" was called to verify the correct maki
ent, procedure, equipment, support staff and site/side marked as required.
 Preparation: Patient was prepped and draped in the usual sterile fashion.
 Local anesthesia used: yes
 Local anesthetic: lidocaine 1% without epinephrine
 Patient sedated: yes
 Sedation type: moderate (conscious) sedation
 
 Sedatives: fentanyl and midazolam
 Vitals: Vital signs were monitored during sedation.
 Patient tolerance: Patient tolerated the procedure well with no immediate complications
 
 [facility]
 
 LEFT CARDIAC CATHETERIZATION REPORT
 
 PATIENT NAME:  Sera Weeks
 YOB: 1945
 MEDICAL RECORD NUMBER:  [id number]
 DATE OF PROCEDURE:   2017
                                                                             
 PRIMARY CARE PROVIDER:  Chandra King MD
 :   Anastasia Brasher MD, [facility], [facility]
 
 PRE-PROCEDURE DIAGNOSIS:   NSTEMI
 POST-PROCEDURE DIAGNOSIS:  same
 
 PROCEDURES PERFORMED:      
 1.  Left Heart Catheterization for pressures
 2.  Coronary Angiography
 3.  Aortic Root 
 
 DESCRIPTION OF PROCEDURE:
 
 Informed consent was obtained from the patient, and a time-out was performed to verify the 
patient's identification and planned procedure.  Please refer to the computer log entry form
 for precise details.  The patient's right radial artery was sterilely prepped.  Arterial ac
cess was achieved with  micropuncture kit.  Patient was then given intravenous heparin as we
ll as intra-arterial nitroglycerin and Nicardipine through the sheath.  A 5 French JR4 and a
 3 DRC did not fit the coronary.  A pullback across the valve was used to assess if there wa
s a gradient across the aortic valve A  JL 3.5 did not fit the left system nor did a LBU 3 d
id not fit.  A JL3 sat close to the anomalous RCA and also opacified the Left.  A pig tail w
as used to perform an aortic root. .  
 The TR band occluder device was utilized to achieve successful hemostasis of the radial art
mik.  There were no immediate complications.  
 
   Estimated blood loss was: 10 cc.
   Fluro Time: 8.9 Min 
   Total Reference Air Kerma: 443.55 mGy
   Contrast: 105 mls of Omnipaque 350
 
 FINDINGS:
 Hemodynamics:
 Left ventricular end-diastolic pressure (LVEDP) was 14 mm Hg.    There was no gradient acro
ss the aortic valve.
 
 Left Ventriculogram:  Not performed 
 Left main coronary artery:  Normal 
 Left anterior descending coronary artery:  Large vessel.  No significant disease.
 Circumflex coronary artery:  Dominant.  Gives off 2 marginal and the PDA.  No significant d
isease.  10% in the AV groove.
 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
usp.  Minimal luminal irregularity.
 
 Aortic Root:  
 Enlarged 
 No AI 
 No obvious dissection 
 
 Possible small aortic ulceration 
 CONCLUSIONS:
 1. Dilated Aortic Root
 2. No AI
 3. Normal Left Main
 4. Normal LAD
 5. Dominant LCX.  10% luminal irregularity in AV groove.  Gives off the PDA
 6. Non dominant RCA.  Mild plaque.  Arises anomalously from the left cusp
 
 CLINICAL IMPRESSION AND RECOMMENDATIONS 
 Will need CT of aorta to rule out dissection
 Echo for LV function
 
 Anastasia Brasher MD, FAC, [facility]
 DATE/TIME:  2017 17:11
 2017 17:11
 
 Portions of this chart were created with Dragon voice recognition software. Occasional wron
g-word or   sound-alike  substitutions may have occurred due to the inherent limitations 
of voice recognition software. Please read the chart carefully and recognize, using context,
 where these substitutions have occurred
 
 Anastasia Brasher
 2017
 Electronically signed by Anastasia Brasher MD at 2017  7:10 PM PSTdocumented in this e
ncounter
 
 Miscellaneous Notes
 Plan of Care - Dayan Daniel RN - 2017  1:59 PM PSTProblem: Patient Care Ove
rview (Adult)
 Goal: Care Team Goals & Evaluation
 PROBLEM-RELATED GOALS:
 **
 
 STRATEGY TO ACHIEVE GOALS:
 **
 
 RESTRAINT-RELATED GOALS:
 
 STRATEGIES TO ACHIEVE RESTRAINT GOALS: 
 Outcome: Improving
 Goal Evaluation:
 Pt denied any chest pain. Ambulated in the hallway independently x 2. No complaint while wa
lking. 
 
 All questions answered. No further questions or concerns raised. Pt ambulated out of the ro
om in a stable condition. Electronically signed by Dayan Daniel RN at 2017  2
:11 PM PSTPlan of Care - Sivan Shin RN - 2017  6:12 AM PSTProblem: Patient 
Care Overview (Adult)
 Goal: Personalization
 Needs & Preferences 
 Outcome: Improving
 Slept well.  Denies chest pain or sob.  VSS.  Independent in room.
 
   Electronically signed by Sivan Shin RN at 2017  6:12 AM PSTPlan of Care -
 Carmelita Cox RN - 2017  4:10 PM PSTProblem: Discharge Planning
 Goal: Patient will be discharged in a safe manner
 Discharge planning:
 This CM met with patient, Sera Weeks. 
 
 She lives alone in her home in [place]. 
 She is very independent. She does have a cane, but rately needs to use it. She does not use
 any hoe oxygen, nor CPAP. 
 She does still drives, does her own cooking, Cleaning and is able to care for herself. 
 Her son lives across the street from her and would help with any of her needs if she had an
y. 
 Her PCP is Dr King and she use [facility] in [place]. 
 Her son will be her transportation home when she is medically stable for discharge. 
 Electronically signed by: Carmelita Cox RN 2017 16:10
   
 
   Electronically signed by Carmelita Cox RN at 2017  4:10 PM PSTPlan of [facility]
Milton Chiu Chaplain - 2017  8:23 AM PSTProblem: Patient Care Overview (Adult)
 Goal: Care Team Goals & Evaluation
 PROBLEM-RELATED GOALS:
 **
 
 STRATEGY TO ACHIEVE GOALS:
 **
 
 RESTRAINT-RELATED GOALS:
 
 STRATEGIES TO ACHIEVE RESTRAINT GOALS: 
 Spiritual Care
 
 Sera Weeks is a 71 y.o. female who is admitted for Chest pain [R07.9] 
 
 This is my first visit with the patient.   Sera Weeks  was asleep at the time of the visi
t. 
 Her granddaughter was present supporting at her bedside and had been there all night.  
 
 Spiritual Evaluation:
 Patient was asleep so no assessment made.   
 Granddaughter is from [place], showed very positive compassion toward her grandmother.
 
 Spiritual Interventions:
 Engaged in social conversation; active listening and pastoral support was provided.  
 Granddaughter stated no needs, offered Chaplaincy support if any needs arose. 
 
 Spiritual Outcomes:
 Expressed appreciation for the visit. 
 
 Spiritual Goals/Followup:
 Will see the patient as requested. If there are any other spiritual care issues that arise,
 please contact .
 
   
 
   Electronically signed by Chaplain Sharan at 2017  8:23 AM PSTPlan of DAVID borrero - Sivan Shin RN - 2017  7:33 AM PSTProblem: Patient Care Overview (Adul
t)
 Goal: Personalization
 Needs & Preferences 
 Outcome: Improving
 Patient slept well overnight.  No c/o chest pain or SOB reported.  R. Radial dressing c/d/i
.  Good pulses.
 
   Electronically signed by Sivan Shin RN at 2017  7:33 AM PSTdocumented in 
this encounter
 
 
 Plan of Treatment
 Not on filedocumented as of this encounter
 
 Procedures
 
 
+---------------------+--------+-------------+----------------------+----------------------+
| Procedure Name      | Priori | Date/Time   | Associated Diagnosis | Comments             |
|                     | ty     |             |                      |                      |
+---------------------+--------+-------------+----------------------+----------------------+
| TROPONIN I          | Routin | 2017  |                      |   Results for this   |
|                     | e      |  3:50 AM    |                      | procedure are in the |
|                     |        | PST         |                      |  results section.    |
+---------------------+--------+-------------+----------------------+----------------------+
| D-DIMER             | Routin | 2017  |                      |   Results for this   |
|                     | e      |  3:49 AM    |                      | procedure are in the |
|                     |        | PST         |                      |  results section.    |
+---------------------+--------+-------------+----------------------+----------------------+
| CBC NO DIFFERENTIAL | Routin | 2017  |                      |   Results for this   |
|                     | e      |  3:49 AM    |                      | procedure are in the |
|                     |        | PST         |                      |  results section.    |
+---------------------+--------+-------------+----------------------+----------------------+
| BASIC METABOLIC     | Routin | 2017  |                      |   Results for this   |
| PANEL               | e      |  3:49 AM    |                      | procedure are in the |
|                     |        | PST         |                      |  results section.    |
+---------------------+--------+-------------+----------------------+----------------------+
| D-DIMER             | Routin | 2017  |                      |   Results for this   |
|                     | e      | 12:33 PM    |                      | procedure are in the |
|                     |        | PST         |                      |  results section.    |
+---------------------+--------+-------------+----------------------+----------------------+
| ECHO COMPLETE       | Routin | 2017  |                      |   Results for this   |
|                     | e      | 11:00 AM    |                      | procedure are in the |
|                     |        | PST         |                      |  results section.    |
+---------------------+--------+-------------+----------------------+----------------------+
| ECG 12 LEAD         | Routin | 2017  |                      |   Results for this   |
|                     | e      |  6:03 AM    |                      | procedure are in the |
|                     |        | PST         |                      |  results section.    |
+---------------------+--------+-------------+----------------------+----------------------+
| LIPID PANEL         | Add-On | 2017  |                      |   Results for this   |
|                     |        |  4:47 AM    |                      | procedure are in the |
|                     |        | PST         |                      |  results section.    |
+---------------------+--------+-------------+----------------------+----------------------+
| EXTRA LAVENDER TOP  | Routin | 2017  |                      |   Results for this   |
| TUBE                | e      |  4:47 AM    |                      | procedure are in the |
|                     |        | PST         |                      |  results section.    |
+---------------------+--------+-------------+----------------------+----------------------+
| COMPREHENSIVE       | Routin | 2017  |                      |   Results for this   |
| METABOLIC PANEL     | e      |  4:47 AM    |                      | procedure are in the |
|                     |        | PST         |                      |  results section.    |
+---------------------+--------+-------------+----------------------+----------------------+
| TROPONIN I          | Routin | 2017  |                      |   Results for this   |
|                     | e      |  8:33 PM    |                      | procedure are in the |
|                     |        | PST         |                      |  results section.    |
+---------------------+--------+-------------+----------------------+----------------------+
| CT ANGIOGRAM CHEST  | Routin | 2017  |                      |   Results for this   |
| ABDOMEN W CONTRAST  | e      |  5:05 PM    |                      | procedure are in the |
|                     |        | PST         |                      |  results section.    |
+---------------------+--------+-------------+----------------------+----------------------+
| CV COR ANGIO        | Routin | 2017  |                      |   Results for this   |
|                     | e      |  4:26 PM    |                      | procedure are in the |
 
|                     |        | PST         |                      |  results section.    |
+---------------------+--------+-------------+----------------------+----------------------+
| CT HEAD W WO        | STAT   | 2017  |                      |   Results for this   |
| CONTRAST            |        |  2:23 PM    |                      | procedure are in the |
|                     |        | PST         |                      |  results section.    |
+---------------------+--------+-------------+----------------------+----------------------+
| ECG 12 LEAD         | STAT   | 2017  |                      |   Results for this   |
|                     |        | 12:50 PM    |                      | procedure are in the |
|                     |        | PST         |                      |  results section.    |
+---------------------+--------+-------------+----------------------+----------------------+
| TROPONIN I          | Routin | 2017  |                      |   Results for this   |
|                     | e      | 12:44 PM    |                      | procedure are in the |
|                     |        | PST         |                      |  results section.    |
+---------------------+--------+-------------+----------------------+----------------------+
| PTT                 | Routin | 2017  |                      |   Results for this   |
|                     | e      | 12:44 PM    |                      | procedure are in the |
|                     |        | PST         |                      |  results section.    |
+---------------------+--------+-------------+----------------------+----------------------+
| CBC NO DIFFERENTIAL | Routin | 2017  |                      |   Results for this   |
|                     | e      | 12:44 PM    |                      | procedure are in the |
|                     |        | PST         |                      |  results section.    |
+---------------------+--------+-------------+----------------------+----------------------+
| BASIC METABOLIC     | STAT   | 2017  |                      |   Results for this   |
| PANEL               |        | 12:44 PM    |                      | procedure are in the |
|                     |        | PST         |                      |  results section.    |
+---------------------+--------+-------------+----------------------+----------------------+
| XR CHEST PA OR AP   | Routin | 2017  |                      |   Results for this   |
|                     | e      |  4:55 AM    |                      | procedure are in the |
|                     |        | PST         |                      |  results section.    |
+---------------------+--------+-------------+----------------------+----------------------+
| ECG - EXTERNAL SCAN |        | 2017  |                      |   Results for this   |
|                     |        | 12:00 AM    |                      | procedure are in the |
|                     |        | PST         |                      |  results section.    |
+---------------------+--------+-------------+----------------------+----------------------+
 documented in this encounter
 
 Results
 Troponin I (2017  3:50 AM PST)
 
+------------+--------------------------+-------------+-------------+--------------+
| Component  | Value                    | Ref Range   | Performed   | Pathologist  |
|            |                          |             | At          | Signature    |
+------------+--------------------------+-------------+-------------+--------------+
| Troponin I | 1.28 ()Comment:        | <0.06 ng/mL | [facility]  |              |
|            | Consistent with previous |             | [facility]    |              |
|            |  results. Reference      |             | MEDICAL     |              |
|            | Ranges:0.00-0.06 =       |             | CENTER -    |              |
|            | NORMAL>0.06       =      |             | LABORATORY  |              |
|            | SUSPICIOUS FOR           |             |             |              |
|            | MYOCARDIAL DAMAGE NOTE:  |             |             |              |
|            | Values greater than 0.50 |             |             |              |
|            |  ng/mL have been shown   |             |             |              |
|            | to be strongly           |             |             |              |
|            | associated with acute    |             |             |              |
|            | myocardial infarction.   |             |             |              |
|            | The American College of  |             |             |              |
|            | Cardiology (ACC)         |             |             |              |
|            | recommends a decision    |             |             |              |
|            | limit of 0.06 ng/mL for  |             |             |              |
|            | this assay.   Results    |             |             |              |
 
|            | greater than 0.06 can    |             |             |              |
|            | reflect a pre-infarct    |             |             |              |
|            | acute coronary syndrome, |             |             |              |
|            |  but can also reflect    |             |             |              |
|            | myocardial necrosis or   |             |             |              |
|            | injury that is not due   |             |             |              |
|            | to coronary artery       |             |             |              |
|            | disease.   Some of these |             |             |              |
|            |  causes are sepsis,      |             |             |              |
|            | hypocolemia, atrial      |             |             |              |
|            | fibrillation, heart      |             |             |              |
|            | failure, pulmonary       |             |             |              |
|            | embolism, myocarditis,   |             |             |              |
|            | myocardial contusion,    |             |             |              |
|            | and renal failure.   The |             |             |              |
|            |  diagnosis of myocardial |             |             |              |
|            |  infarction should be    |             |             |              |
|            | based on a combination   |             |             |              |
|            | of the patient's         |             |             |              |
|            | clinical presentation    |             |             |              |
|            | and the clinical         |             |             |              |
|            | laboratory test results  |             |             |              |
|            | (especially serial       |             |             |              |
|            | troponin levels).        |             |             |              |
+------------+--------------------------+-------------+-------------+--------------+
 
 
 
+----------+
| Specimen |
+----------+
| Blood    |
+----------+
 
 
 
+----------------------+--------------------+--------------------+----------------+
| Performing           | Address            | City/State/Zipcode | Phone Number   |
| Organization         |                    |                    |                |
+----------------------+--------------------+--------------------+----------------+
|   [facility]     |   [address] |   [place]  |   [phone] |
| [facility]  |                    | [address]              |                |
| - LABORATORY         |                    |                    |                |
+----------------------+--------------------+--------------------+----------------+
 CBC no Differential (2017  3:49 AM PST)
 
+-------------+---------+-----------------+-------------+--------------+
| Component   | Value   | Ref Range       | Performed   | Pathologist  |
|             |         |                 | At          | Signature    |
+-------------+---------+-----------------+-------------+--------------+
| White Blood | 4.4     | 4.0 - 11.0 K/uL | [facility]  |              |
|  Cells      |         |                 |  [facility]    |              |
|             |         |                 | MEDICAL     |              |
|             |         |                 | CENTER -    |              |
|             |         |                 | LABORATORY  |              |
+-------------+---------+-----------------+-------------+--------------+
| Red Blood   | 3.85    | 3.70 - 5.20     | [facility]  |              |
| Cells       |         | M/uL            | [facility]    |              |
|             |         |                 | MEDICAL     |              |
|             |         |                 | CENTER -    |              |
 
|             |         |                 | LABORATORY  |              |
+-------------+---------+-----------------+-------------+--------------+
| Hemoglobin  | 12.5    | 11.5 - 16.0     | [facility]  |              |
|             |         | g/dL            | [facility]    |              |
|             |         |                 | MEDICAL     |              |
|             |         |                 | CENTER -    |              |
|             |         |                 | LABORATORY  |              |
+-------------+---------+-----------------+-------------+--------------+
| Hematocrit  | 36.4    | 34.0 - 47.0 %   | [facility]  |              |
|             |         |                 | [facility]    |              |
|             |         |                 | MEDICAL     |              |
|             |         |                 | CENTER -    |              |
|             |         |                 | LABORATORY  |              |
+-------------+---------+-----------------+-------------+--------------+
| MCV         | 94.6    | 83.0 - 101.0 fL | [facility]  |              |
|             |         |                 | [facility]    |              |
|             |         |                 | MEDICAL     |              |
|             |         |                 | CENTER -    |              |
|             |         |                 | LABORATORY  |              |
+-------------+---------+-----------------+-------------+--------------+
| MCH         | 32.5    | 28.0 - 35.0 pg  | [facility]  |              |
|             |         |                 | [facility]    |              |
|             |         |                 | MEDICAL     |              |
|             |         |                 | CENTER -    |              |
|             |         |                 | LABORATORY  |              |
+-------------+---------+-----------------+-------------+--------------+
| MCHC        | 34.4    | 32.0 - 36.0     | [facility]  |              |
|             |         | g/dL            | [facility]    |              |
|             |         |                 | MEDICAL     |              |
|             |         |                 | CENTER -    |              |
|             |         |                 | LABORATORY  |              |
+-------------+---------+-----------------+-------------+--------------+
| RDW-CV      | 13.1    | <15.0 %         | [facility]  |              |
|             |         |                 | [facility]    |              |
|             |         |                 | MEDICAL     |              |
|             |         |                 | CENTER -    |              |
|             |         |                 | LABORATORY  |              |
+-------------+---------+-----------------+-------------+--------------+
| Platelet    | 125 (L) | 140 - 440 K/uL  | [facility]  |              |
| Count       |         |                 | [facility]    |              |
|             |         |                 | MEDICAL     |              |
|             |         |                 | CENTER -    |              |
|             |         |                 | LABORATORY  |              |
+-------------+---------+-----------------+-------------+--------------+
| MPV         | 9.6     | fL              | [facility]  |              |
|             |         |                 | [facility]    |              |
|             |         |                 | MEDICAL     |              |
|             |         |                 | CENTER -    |              |
|             |         |                 | LABORATORY  |              |
+-------------+---------+-----------------+-------------+--------------+
 
 
 
+----------+
| Specimen |
+----------+
| Blood    |
+----------+
 
 
 
 
+----------------------+--------------------+--------------------+----------------+
| Performing           | Address            | City/State/Zipcode | Phone Number   |
| Organization         |                    |                    |                |
+----------------------+--------------------+--------------------+----------------+
|   [facility]     |   [address] |   [place]  |   [phone] |
| [facility]  |                    | [address]              |                |
| - LABORATORY         |                    |                    |                |
+----------------------+--------------------+--------------------+----------------+
 D-Dimer (2017  3:49 AM PST)
 
+-------------+--------------------------+--------------+-------------+--------------+
| Component   | Value                    | Ref Range    | Performed   | Pathologist  |
|             |                          |              | At          | Signature    |
+-------------+--------------------------+--------------+-------------+--------------+
| D-Dimer     | 0.49Comment: This        | <=0.50 ug/ml | [facility]  |              |
| Quantitativ | quantitative D-Dimer     |              | [facility]    |              |
| e           | assay has been evaluated |              | MEDICAL     |              |
|             |  for screening for       |              | CENTER -    |              |
|             | venous thrombotic        |              | LABORATORY  |              |
|             | disease, and may be      |              |             |              |
|             | useful in ruling out,    |              |             |              |
|             | but not ruling in        |              |             |              |
|             | disease. Values less     |              |             |              |
|             | than 0.50 ug/mL FEU      |              |             |              |
|             | (Fibrinogen Equivalent   |              |             |              |
|             | Units) have a negative   |              |             |              |
|             | predictive value of      |              |             |              |
|             | approximately 95% for    |              |             |              |
|             | ruling out large         |              |             |              |
|             | pulmonary emboli or      |              |             |              |
|             | proximal deep vein       |              |             |              |
|             | thrombosis. Distal DVT   |              |             |              |
|             | are not excluded. An     |              |             |              |
|             | elevated D-dimer can be  |              |             |              |
|             | present in patients with |              |             |              |
|             |  liver disease,          |              |             |              |
|             | pregnancy, eclampsia,    |              |             |              |
|             | heart disease and some   |              |             |              |
|             | cancers among other      |              |             |              |
|             | conditions. The presence |              |             |              |
|             |  of rheumatoid factor at |              |             |              |
|             |  a level >50 IU/mL may   |              |             |              |
|             | falsely elevate the      |              |             |              |
|             | determined D-dimer       |              |             |              |
|             | levels.                  |              |             |              |
+-------------+--------------------------+--------------+-------------+--------------+
 
 
 
+----------+
| Specimen |
+----------+
| Blood    |
+----------+
 
 
 
+----------------------+--------------------+--------------------+----------------+
| Performing           | Address            | City/State/Zipcode | Phone Number   |
 
| Organization         |                    |                    |                |
+----------------------+--------------------+--------------------+----------------+
|   [facility]     |   [address] |   [place]  |   [phone] |
| [facility]  |                    | [address]              |                |
| - LABORATORY         |                    |                    |                |
+----------------------+--------------------+--------------------+----------------+
 Basic Metabolic Panel (2017  3:49 AM PST)
 
+-------------+--------------------------+-----------------+-------------+--------------+
| Component   | Value                    | Ref Range       | Performed   | Pathologist  |
|             |                          |                 | At          | Signature    |
+-------------+--------------------------+-----------------+-------------+--------------+
| Na          | 141                      | 136 - 149       | [facility]  |              |
|             |                          | mmol/L          | [facility]    |              |
|             |                          |                 | [facility]     |              |
|             |                          |                 | [facility] -    |              |
|             |                          |                 | LABORATORY  |              |
+-------------+--------------------------+-----------------+-------------+--------------+
| K           | 3.8                      | 3.5 - 5.1       | [facility]  |              |
|             |                          | mmol/L          | [facility]    |              |
|             |                          |                 | [facility]     |              |
|             |                          |                 | CENTER -    |              |
|             |                          |                 | LABORATORY  |              |
+-------------+--------------------------+-----------------+-------------+--------------+
| Cl          | 105                      | 98 - 109 mmol/L | [facility]  |              |
|             |                          |                 | [facility]    |              |
|             |                          |                 | [facility]     |              |
|             |                          |                 | CENTER -    |              |
|             |                          |                 | LABORATORY  |              |
+-------------+--------------------------+-----------------+-------------+--------------+
| CO2         | 29                       | 24 - 31 mmol/L  | [facility]  |              |
|             |                          |                 | [facility]    |              |
|             |                          |                 | MEDICAL     |              |
|             |                          |                 | CENTER -    |              |
|             |                          |                 | LABORATORY  |              |
+-------------+--------------------------+-----------------+-------------+--------------+
| Anion Gap   | 7                        | 3 - 16 mmol/L   | [facility]  |              |
|             |                          |                 | [facility]    |              |
|             |                          |                 | MED[facility]     |              |
|             |                          |                 | CENTER -    |              |
|             |                          |                 | LABORATORY  |              |
+-------------+--------------------------+-----------------+-------------+--------------+
| Glucose     | 95                       | 70 - 109 mg/dL  | [facility]  |              |
|             |                          |                 | [facility]    |              |
|             |                          |                 | MED[facility]     |              |
|             |                          |                 | CENTER -    |              |
|             |                          |                 | LABORATORY  |              |
+-------------+--------------------------+-----------------+-------------+--------------+
| BUN         | 10                       | 7 - 18 mg/dL    | [facility]  |              |
|             |                          |                 | [facility]    |              |
|             |                          |                 | MEDICAL     |              |
|             |                          |                 | CENTER -    |              |
|             |                          |                 | LABORATORY  |              |
+-------------+--------------------------+-----------------+-------------+--------------+
| Creatinine  | 0.72                     | 0.60 - 1.30     | [place]  |              |
|             |                          | mg/dL           | [facility]    |              |
|             |                          |                 | MEDICAL     |              |
|             |                          |                 | CENTER -    |              |
|             |                          |                 | LABORATORY  |              |
+-------------+--------------------------+-----------------+-------------+--------------+
 
| eGFR if not | >60Comment: GLOMERULAR   | >=60            | [facility]  |              |
|      | FILTRATION               | mL/min/1.73m2   | [facility]    |              |
| [employer]    | RATE,ESTIMATED           |                 | MEDICAL     |              |
|             |   mL/min/1.99v4Bazo than |                 | CENTER -    |              |
|             |  60    Chronic kidney    |                 | LABORATORY  |              |
|             | disease,if found over a  |                 |             |              |
|             | 3-month period.Less than |                 |             |              |
|             |  15    Kidney failureFor |                 |             |              |
|             |                   |                 |             |              |
|             | Americans,multiply the   |                 |             |              |
|             | calculated GFR by 1.21.  |                 |             |              |
|             |                          |                 |             |              |
+-------------+--------------------------+-----------------+-------------+--------------+
| Calcium     | 8.8                      | 8.3 - 10.5      | [facility]  |              |
|             |                          | mg/dL           | [facility]    |              |
|             |                          |                 | [facility]     |              |
|             |                          |                 | CENTER -    |              |
|             |                          |                 | LABORATORY  |              |
+-------------+--------------------------+-----------------+-------------+--------------+
| BUN/Creatin | 13.9                     |                 | [facility]  |              |
| ine Ratio   |                          |                 | [facility]    |              |
|             |                          |                 | MEDICAL     |              |
|             |                          |                 | CENTER -    |              |
|             |                          |                 | LABORATORY  |              |
+-------------+--------------------------+-----------------+-------------+--------------+
 
 
 
+----------+
| Specimen |
+----------+
| Blood    |
+----------+
 
 
 
+----------------------+--------------------+--------------------+----------------+
| Performing           | Address            | City/State/Zipcode | Phone Number   |
| Organization         |                    |                    |                |
+----------------------+--------------------+--------------------+----------------+
|   [facility]     |   [address] |   [place]  |   [phone] |
| [facility]  |                    | [address]              |                |
| - LABORATORY         |                    |                    |                |
+----------------------+--------------------+--------------------+----------------+
 D-Dimer (2017 12:33 PM PST)
 
+-------------+--------------------------+--------------+-------------+--------------+
| Component   | Value                    | Ref Range    | Performed   | Pathologist  |
|             |                          |              | At          | Signature    |
+-------------+--------------------------+--------------+-------------+--------------+
| D-Dimer     | 0.61 (H)Comment: This    | <=0.50 ug/ml | [facility]  |              |
| Quantitativ | quantitative D-Dimer     |              | [facility]    |              |
| e           | assay has been evaluated |              | MEDICAL     |              |
|             |  for screening for       |              | CENTER -    |              |
|             | venous thrombotic        |              | LABORATORY  |              |
|             | disease, and may be      |              |             |              |
|             | useful in ruling out,    |              |             |              |
|             | but not ruling in        |              |             |              |
|             | disease. Values less     |              |             |              |
|             | than 0.50 ug/mL FEU      |              |             |              |
 
|             | (Fibrinogen Equivalent   |              |             |              |
|             | Units) have a negative   |              |             |              |
|             | predictive value of      |              |             |              |
|             | approximately 95% for    |              |             |              |
|             | ruling out large         |              |             |              |
|             | pulmonary emboli or      |              |             |              |
|             | proximal deep vein       |              |             |              |
|             | thrombosis. Distal DVT   |              |             |              |
|             | are not excluded. An     |              |             |              |
|             | elevated D-dimer can be  |              |             |              |
|             | present in patients with |              |             |              |
|             |  liver disease,          |              |             |              |
|             | pregnancy, eclampsia,    |              |             |              |
|             | heart disease and some   |              |             |              |
|             | cancers among other      |              |             |              |
|             | conditions. The presence |              |             |              |
|             |  of rheumatoid factor at |              |             |              |
|             |  a level >50 IU/mL may   |              |             |              |
|             | falsely elevate the      |              |             |              |
|             | determined D-dimer       |              |             |              |
|             | levels.                  |              |             |              |
+-------------+--------------------------+--------------+-------------+--------------+
 
 
 
+----------+
| Specimen |
+----------+
| Blood    |
+----------+
 
 
 
+----------------------+--------------------+--------------------+----------------+
| Performing           | Address            | City/State/Zipcode | Phone Number   |
| Organization         |                    |                    |                |
+----------------------+--------------------+--------------------+----------------+
|   [facility]     |   [address] |   [place]  |   [phone] |
| [facility]  |                    | [id number]              |                |
| - LABORATORY         |                    |                    |                |
+----------------------+--------------------+--------------------+----------------+
 ECHO Complete (2017 11:00 AM PST)
 
+-------------+-------+-----------+------------+--------------+
| Component   | Value | Ref Range | Performed  | Pathologist  |
|             |       |           | At         | Signature    |
+-------------+-------+-----------+------------+--------------+
| LVEF-TTE    | 62    |           |            |              |
| [facility]STHORAC |       |           |            |              |
| IC ECHO     |       |           |            |              |
+-------------+-------+-----------+------------+--------------+
 
 
 
+----------+
| Specimen |
+----------+
|          |
+----------+
 
 
 
 
+----------------------------------------------------------------------------------------+--
------------+
| Narrative                                                                              | P
erformed At |
+----------------------------------------------------------------------------------------+--
------------+
|   This result has an attachment that is not available.  Transthoracic                  |  
            |
| Echocardiography Report (TTE)  Demographics  Patient Name      YOU                   |  
            |
| SERA Room Number                   453  Patient Number   [id number]                 |  
            |
|    Date of Study                2017  Visit Number                               |  
            |
|   [id number]  Accession          [id number]    Referring Physician                    |  
            |
|     ANASTASIA BRASHER MD Number  Date of Birth    1945                              |  
            |
| Sonographer                   [facility]   Age                               |  
            |
|       71 year(s)    Interpreting                  ANASTASIA BRASHER MD                    |  
            |
|                                           Cardiologist  Gender                         |  
            |
|         Female          Nurse Procedure Type of Study  TTE procedure:                  |  
            |
| ECHO Complete. Procedure dateDate: 2017Start: 10:22 AM Technical                 |  
            |
|  Quality: Adequate visualizationStudy Location: ICU Patient Status:                    |  
            |
| RoutineHeight: 62.99 inchesWeight: 163 poundsBSA: 1.77 m^2BMI: 28.88                   |  
            |
| kg/m^2HR: 54 bpm ConclusionsSummaryNormal Left Ventricular                             |  
            |
| contractility was noted.Left ventricle is normal in size and function.                 |  
            |
|  Ejection fraction isestimated at 62%.Trace AIDilated left                             |  
            |
| atrium.aortic root at the upper limits of normal in sizenormal IVC                     |  
            |
| Signature-------------------------------------------------------------                 |  
            |
| --------------- Electronically signed by ANASTASIA BRASHER,                                |  
            |
| MD(Interpreting physician) on 2017 01:08                                         |  
            |
| PM--------------------------------------------------------------------                 |  
            |
| -------- FindingsMitral ValveStructurally normal mitral valve without                  |  
            |
| significant stenosis orregurgitation.Aortic ValveTrace AITricuspid                     |  
            |
| ValveTrace TRnot adequate to calculate PA pressurePulmonic ValveThe                    |  
            |
| pulmonic valve was not well visualized.Left AtriumDilated left                         |  
            |
| atrium.Left VentricleLeft ventricle is normal in size and function.                    |  
            |
 
| Ejection fraction isestimated at 62%.Right AtriumNormal right                          |  
            |
| atrium.Right VentricleNormal right ventricular structure and                           |  
            |
| function.Pericardial EffusionNo evidence of pericardial effusion.                      |  
            |
| Miscellaneousaortic root at the upper limits of normal in sizenormal                   |  
            |
| IVC Valves  Mitral Valve  Peak E-Wave: 0.84 m/s Peak A-Wave: 0.45 m/s                  |  
            |
|  Tissue Doppler  Septal e' Velocity: 0.08 m/s Septal E/e' Ratio:10.49                  |  
            |
|  Aortic Valve        Area (continuity): 0.98 cm^2       Mean Gradient:                 |  
            |
|  3.31 mmHg  LVOT  Peak Velocity: 0.81 m/s LVOT Diameter: 1.25 cm                       |  
            |
| Structures  Left Atrium  LA A/P Dimension: 4.87 cm                                     |  
            |
|               LA Area: 19.01 cm^2 LA Vol/BSA Index: 30 mL/m^2                          |  
            |
|                      LA Volume: 53.51 ml                                               |  
            |
|                                           EF Cdkejxfeh87%  Left                        |  
            |
| Ventricle  Diastolic Dimension: 4.81 cm             Systolic                           |  
            |
| Dimension: 2.84 cm Septum Diastolic: 0.91 cm PW Diastolic: 0.91 cm EF                  |  
            |
| Calculated: 62%  Miscellaneous  Aorta  Aortic Root: 3.56 cm Ascending                  |  
            |
| Aorta: 2.3 cm                                                                          |  
            |
|aortic root at the upper limits of normal in size                                       |  
            |
|normal IVC                                                                              |  
            |
|                                                                                        |  
            |
|Signature                                                                              |   
           |
|----------------------------------------------------------------------------             | 
             |
| Electronically signed by ANASTASIA BRASHER MD(Interpreting physician) on                  |  
            |
| 2017 01:08 PM                                                                    |  
            |
|----------------------------------------------------------------------------             | 
             |
|                                                                                        |  
            |
|Findings                                                                               |   
           |
|Mitral Valve                                                                            |  
            |
|Structurally normal mitral valve without significant stenosis or                        |  
            |
|regurgitation.                                                                         |   
           |
|Aortic Valve                                                                            |  
            |
 
|Trace AI                                                                                |  
            |
|Tricuspid Valve                                                                         |  
            |
|Trace TR                                                                                |  
            |
|not adequate to calculate PA pressure                                                   |  
            |
|Pulmonic Valve                                                                          |  
            |
|The pulmonic valve was not well visualized.                                             |  
            |
|Left Atrium                                                                             |  
            |
|Dilated left atrium.                                                                    |  
            |
|Left Ventricle                                                                          |  
            |
|Left ventricle is normal in size and function. Ejection fraction is                     |  
            |
|estimated at 62%.                                                                       |  
            |
|Right Atrium                                                                            |  
            |
|Normal right atrium.                                                                    |  
            |
|Right Ventricle                                                                         |  
            |
|Normal right ventricular structure and function.                                        |  
            |
|Pericardial Effusion                                                                    |  
            |
|No evidence of pericardial effusion.                                                    |  
            |
|                                                                                        |  
            |
|Miscellaneous                                                                          |   
           |
|aortic root at the upper limits of normal in size                                       |  
            |
|normal IVC                                                                              |  
            |
|                                                                                        |  
            |
|Valves                                                                                 |   
           |
|                                                                                        |  
            |
| Mitral Valve                                                                           |  
            |
|                                                                                        |  
            |
| Peak E-Wave: 0.84 m/s                                                                  |  
            |
| Peak A-Wave: 0.45 m/s                                                                  |  
            |
|                                                                                        |  
            |
| Tissue Doppler                                                                         |  
            |
 
|                                                                                        |  
            |
| Septal e' Velocity: 0.08 m/s                                                           |  
            |
| Septal E/e' Ratio:10.49                                                                |  
            |
|                                                                                        |  
            |
| Aortic Valve                                                                           |  
            |
|                                                                                        |  
            |
|       Area (continuity): 0.98 cm^2                                                     |  
            |
|       Mean Gradient: 3.31 mmHg                                                         |  
            |
|                                                                                        |  
            |
| LVOT                                                                                   |  
            |
|                                                                                        |  
            |
| Peak Velocity: 0.81 m/s                                                                |  
            |
| LVOT Diameter: 1.25 cm                                                                 |  
            |
|                                                                                        |  
            |
|Structures                                                                             |   
           |
|                                                                                        |  
            |
| Left Atrium                                                                            |  
            |
|                                                                                        |  
            |
| LA A/P Dimension: 4.87 cm                                 LA Area: 19.01 cm^2          |  
            |
| LA Vol/BSA Index: 30 mL/m^2                              LA Volume: 53.51 ml           |  
            |
|                                                                        EF Dhsslnmtv63% |  
            |
|                                                                                        |  
            |
| Left Ventricle                                                                         |  
            |
|                                                                                        |  
            |
| Diastolic Dimension: 4.81 cm             Systolic Dimension: 2.84 cm                   |  
            |
| Septum Diastolic: 0.91 cm                                                              |  
            |
| PW Diastolic: 0.91 cm                                                                  |  
            |
| EF Calculated: 62%                                                                     |  
            |
|                                                                                        |  
            |
| Miscellaneous                                                                          |  
            |
 
|                                                                                        |  
            |
| Aorta                                                                                  |  
            |
|                                                                                        |  
            |
| Aortic Root: 3.56 cm                                                                   |  
            |
| Ascending Aorta: 2.3 cm                                                                |  
            |
|                                                                                        |  
            |
+----------------------------------------------------------------------------------------+--
------------+
 
 
 
+------------------------------------------------------------------------------------------+
| Procedure Note                                                                           |
+------------------------------------------------------------------------------------------+
|   Ace, Rad Results In - 2017  1:08 PM [facility]  Transthoracic Echocardiography Report   |
| (TTE) Demographics Patient Name    YOU LOERA Room Number             453 Patient      |
| Number  [id number]  Date of Study           2017 Visit Number    [id number]      |
| Accession       [id number]   Referring Physician     ANASTASIA BRASHER MD Number Date of    |
| Birth   1945   Sonographer             CAROL ANGELO,  Age             71    |
| year(s)   Interpreting            ANASTASIA BRASHER MD                                      |
| Cardiologist Gender          Female       NurseProcedureType of Study TTE procedure:     |
| ECHO Complete.Procedure dateDate: 2017Start: 10:22 AMTechnical Quality: Adequate   |
| visualizationStudy Location: ICUPatient Status: RoutineHeight: 62.99 inchesWeight: 163   |
| poundsBSA: 1.77 m^2BMI: 28.88 kg/m^2HR: 54 bpmConclusionsSummaryNormal Left Ventricular  |
| contractility was noted.Left ventricle is normal in size and function. Ejection fraction |
|  isestimated at 62%.Trace AIDilated left atrium.aortic root at the upper limits of       |
| normal in sizenormal                                                                     |
| IVCSignature---------------------------------------------------------------------------- |
|  Electronically signed by ANASTASIA BRASHER MD(Interpreting physician) on 2017 01:08  |
| PM----------------------------------------------------------------------------FindingsMi |
| tral ValveStructurally normal mitral valve without significant stenosis                  |
| orregurgitation.Aortic ValveTrace AITricuspid ValveTrace TRnot adequate to calculate PA  |
| pressurePulmonic ValveThe pulmonic valve was not well visualized.Left AtriumDilated left |
|  atrium.Left VentricleLeft ventricle is normal in size and function. Ejection fraction   |
| isestimated at 62%.Right AtriumNormal right atrium.Right VentricleNormal right           |
| ventricular structure and function.Pericardial EffusionNo evidence of pericardial        |
| effusion.Miscellaneousaortic root at the upper limits of normal in sizenormal IVCValves  |
| Mitral Valve Peak E-Wave: 0.84 m/s Peak A-Wave: 0.45 m/s Tissue Doppler Septal e'        |
| Velocity: 0.08 m/s Septal E/e' Ratio:10.49 Aortic Valve     Area (continuity): 0.98 cm^2 |
|      Mean Gradient: 3.31 mmHg LVOT Peak Velocity: 0.81 m/s LVOT Diameter: 1.25           |
| cmStructures Left Atrium LA A/P Dimension: 4.87 cm                      LA Area: 19.01   |
| cm^2 LA Vol/BSA Index: 30 mL/m^2                    LA Volume: 53.51 ml                  |
|                                EF [id number]% Left Ventricle Diastolic Dimension: 4.81  |
| cm         Systolic Dimension: 2.84 cm Septum Diastolic: 0.91 cm PW Diastolic: 0.91 cm   |
| EF Calculated: 62% Miscellaneous Aorta Aortic Root: 3.56 cm Ascending Aorta: 2.3 cm      |
|Patient Status: Routine                                                                   |
|Height: 62.99 inchesWeight: 163 poundsBSA: 1.77 m^2BMI: 28.88 kg/m^2                      |
|HR: 54 bpm                                                                                |
|                                                                                          |
|Conclusions                                                                              |
|Summary                                                                                  |
|Normal Left Ventricular contractility was noted.                                          |
|Left ventricle is normal in size and function. Ejection fraction is                       |
|estimated at 62%.                                                                         |
 
|Trace AI                                                                                  |
|Dilated left atrium.                                                                      |
|aortic root at the upper limits of normal in size                                         |
|normal IVC                                                                                |
|                                                                                          |
|Signature                                                                                |
|----------------------------------------------------------------------------               
|
| Electronically signed by ANASTASIA BRASHER MD(Interpreting physician) on                    |
| 2017 01:08 PM                                                                      |
|----------------------------------------------------------------------------               
|
|                                                                                          |
|Findings                                                                                 |
|Mitral Valve                                                                              |
|Structurally normal mitral valve without significant stenosis or                          |
|regurgitation.                                                                           |
|Aortic Valve                                                                              |
|Trace AI                                                                                  |
|Tricuspid Valve                                                                           |
|Trace TR                                                                                  |
|not adequate to calculate PA pressure                                                     |
|Pulmonic Valve                                                                            |
|The pulmonic valve was not well visualized.                                               |
|Left Atrium                                                                               |
|Dilated left atrium.                                                                      |
|Left Ventricle                                                                            |
|Left ventricle is normal in size and function. Ejection fraction is                       |
|estimated at 62%.                                                                         |
|Right Atrium                                                                              |
|Normal right atrium.                                                                      |
|Right Ventricle                                                                           |
|Normal right ventricular structure and function.                                          |
|Pericardial Effusion                                                                      |
|No evidence of pericardial effusion.                                                      |
|                                                                                          |
|Miscellaneous                                                                            |
|aortic root at the upper limits of normal in size                                         |
|normal IVC                                                                                |
|                                                                                          |
|Valves                                                                                   |
|                                                                                          |
| Mitral Valve                                                                             |
|                                                                                          |
| Peak E-Wave: 0.84 m/s                                                                    |
| Peak A-Wave: 0.45 m/s                                                                    |
|                                                                                          |
| Tissue Doppler                                                                           |
|                                                                                          |
| Septal e' Velocity: 0.08 m/s                                                             |
| Septal E/e' Ratio:10.49                                                                  |
|                                                                                          |
| Aortic Valve                                                                             |
|                                                                                          |
|     Area (continuity): 0.98 cm^2                                                         |
|     Mean Gradient: 3.31 mmHg                                                             |
|                                                                                          |
| LVOT                                                                                     |
|                                                                                          |
| Peak Velocity: 0.81 m/s                                                                  |
 
| LVOT Diameter: 1.25 cm                                                                   |
|                                                                                          |
|Structures                                                                               |
|                                                                                          |
| Left Atrium                                                                              |
|                                                                                          |
| LA A/P Dimension: 4.87 cm                      LA Area: 19.01 cm^2                       |
| LA Vol/BSA Index: 30 mL/m^2                    LA Volume: 53.51 ml                       |
|                                                EF [id number]%                           |
|                                                                                          |
| Left Ventricle                                                                           |
|                                                                                          |
| Diastolic Dimension: 4.81 cm         Systolic Dimension: 2.84 cm                         |
| Septum Diastolic: 0.91 cm                                                                |
| PW Diastolic: 0.91 cm                                                                    |
| EF Calculated: 62%                                                                       |
|                                                                                          |
| Miscellaneous                                                                            |
|                                                                                          |
| Aorta                                                                                    |
|                                                                                          |
| Aortic Root: 3.56 cm                                                                     |
| Ascending Aorta: 2.3 cm                                                                  |
+------------------------------------------------------------------------------------------+
 ECG 12 lead (2017  6:03 AM PST)
 
+-------------+--------------------------+-----------+------------+--------------+
| Component   | Value                    | Ref Range | Performed  | Pathologist  |
|             |                          |           | At         | Signature    |
+-------------+--------------------------+-----------+------------+--------------+
| VENTRICULAR | 56                       | BPM       | [facility]  |              |
|  RATE EKG   |                          |           |            |              |
+-------------+--------------------------+-----------+------------+--------------+
| ATRIAL RATE | 56                       | BPM       | [facility]  |              |
+-------------+--------------------------+-----------+------------+--------------+
| P-R         | 148                      | ms        | [facility]  |              |
| INTERVAL    |                          |           |            |              |
+-------------+--------------------------+-----------+------------+--------------+
| QRS         | 80                       | ms        | [facility]  |              |
| DURATION    |                          |           |            |              |
+-------------+--------------------------+-----------+------------+--------------+
| Q-T         | 470                      | ms        | [facility]  |              |
| INTERVAL    |                          |           |            |              |
+-------------+--------------------------+-----------+------------+--------------+
| Q-T         | 453                      | ms        | [facility]  |              |
| INTERVAL    |                          |           |            |              |
| (CORRECTED) |                          |           |            |              |
+-------------+--------------------------+-----------+------------+--------------+
| P WAVE AXIS | 19                       | degrees   | [facility]  |              |
+-------------+--------------------------+-----------+------------+--------------+
| QRS AXIS    | 4                        | degrees   | [facility]  |              |
+-------------+--------------------------+-----------+------------+--------------+
| T AXIS      | 49                       | degrees   | [facility]  |              |
+-------------+--------------------------+-----------+------------+--------------+
| INTERPRETAT | Sinus bradycardiaLow     |           | [facility]  |              |
| ION TEXT    | voltage QRSBorderline    |           |            |              |
|             | ECGWhen compared with    |           |            |              |
|             | ECG of 2017       |           |            |              |
|             | 12:50,No significant     |           |            |              |
|             | change was               |           |            |              |
 
|             | foundConfirmed by        |           |            |              |
|             | ANASTASIA BRASHRE MD        |           |            |              |
|             | ([id number]) on 2017      |           |            |              |
|             | 9:45:24 AMAlso confirmed |           |            |              |
|             |  by MANJINDER FINE MD      |           |            |              |
|             | ([id number])   on 2017    |           |            |              |
|             | 9:52:00 AM               |           |            |              |
+-------------+--------------------------+-----------+------------+--------------+
 
 
 
+----------+
| Specimen |
+----------+
|          |
+----------+
 
 
 
+----------------------------------------------------------+--------------+
| Narrative                                                | Performed At |
+----------------------------------------------------------+--------------+
|   This result has an attachment that is not available.   |              |
+----------------------------------------------------------+--------------+
 
 
 
+--------------+---------+--------------------+--------------+
| Performing   | Address | City/State/Zipcode | Phone Number |
| Organization |         |                    |              |
+--------------+---------+--------------------+--------------+
|   [facility]  |         |                    |              |
+--------------+---------+--------------------+--------------+
 Lipid Panel (2017  4:47 AM PST)
 
+-------------+-------------------------+-----------------+-------------+--------------+
| Component   | Value                   | Ref Range       | Performed   | Pathologist  |
|             |                         |                 | At          | Signature    |
+-------------+-------------------------+-----------------+-------------+--------------+
| Triglycerid | 122                     | 35 - 160 mg/dL  | [facility]  |              |
| es          |                         |                 | [facility]    |              |
|             |                         |                 | [facility]     |              |
|             |                         |                 | [facility] -    |              |
|             |                         |                 | LABORATORY  |              |
+-------------+-------------------------+-----------------+-------------+--------------+
| Cholesterol | 149 (L)                 | 150 - 200 mg/dL | [facility]  |              |
|             |                         |                 | [facility]    |              |
|             |                         |                 | [facility]     |              |
|             |                         |                 | [facility] -    |              |
|             |                         |                 | LABORATORY  |              |
+-------------+-------------------------+-----------------+-------------+--------------+
| HDL         | 44Comment:     New HDL  | 28 - 83 mg/dL   | [facility]  |              |
|             | Reference Range as of   |                 | [facility]    |              |
|             | September 10, 2015      |                 | MEDICAL     |              |
|             | Values may be 10-20%    |                 | CENTER -    |              |
|             | lower with new,         |                 | LABORATORY  |              |
|             | standardized method.    |                 |             |              |
+-------------+-------------------------+-----------------+-------------+--------------+
| Chol/HDL    | 3.4                     |                 | [facility]  |              |
| Ratio       |                         |                 | [facility]    |              |
 
|             |                         |                 | [facility]     |              |
|             |                         |                 | [facility] -    |              |
|             |                         |                 | LABORATORY  |              |
+-------------+-------------------------+-----------------+-------------+--------------+
| LDL,        | 81                      | <=130 mg/dL     | [facility]  |              |
| Calculated  |                         |                 | [facility]    |              |
|             |                         |                 | [facility]     |              |
|             |                         |                 | [facility] -    |              |
|             |                         |                 | LABORATORY  |              |
+-------------+-------------------------+-----------------+-------------+--------------+
 
 
 
+----------+
| Specimen |
+----------+
| Blood    |
+----------+
 
 
 
+----------------------+--------------------+--------------------+----------------+
| Performing           | Address            | City/State/Zipcode | Phone Number   |
| Organization         |                    |                    |                |
+----------------------+--------------------+--------------------+----------------+
|   [facility]     |   [address] |   [place]  |   [phone] |
| [facility]  |                    | [address]              |                |
| - LABORATORY         |                    |                    |                |
+----------------------+--------------------+--------------------+----------------+
 Extra Lavender Top Tube (2017  4:47 AM PST)
 
+-----------+-------+-----------+-------------+--------------+
| Component | Value | Ref Range | Performed   | Pathologist  |
|           |       |           | At          | Signature    |
+-----------+-------+-----------+-------------+--------------+
| Extra     | Done  |           | [facility]  |              |
| Lavender  |       |           | [facility]    |              |
| Top Tube  |       |           | MEDICAL     |              |
|           |       |           | CENTER -    |              |
|           |       |           | LABORATORY  |              |
+-----------+-------+-----------+-------------+--------------+
 
 
 
+----------+
| Specimen |
+----------+
| Blood    |
+----------+
 
 
 
+----------------------+--------------------+--------------------+----------------+
| Performing           | Address            | City/State/Zipcode | Phone Number   |
| Organization         |                    |                    |                |
+----------------------+--------------------+--------------------+----------------+
|   [facility]     |   [address] |   [place]  |   [phone] |
| [facility]  |                    | [id number]              |                |
| - LABORATORY         |                    |                    |                |
+----------------------+--------------------+--------------------+----------------+
 
 Comprehensive Metabolic Panel (2017  4:47 AM PST)
 
+-------------+--------------------------+-----------------+-------------+--------------+
| Component   | Value                    | Ref Range       | Performed   | Pathologist  |
|             |                          |                 | At          | Signature    |
+-------------+--------------------------+-----------------+-------------+--------------+
| Na          | 144                      | 136 - 149       | [facility]  |              |
|             |                          | mmol/L          | [facility]    |              |
|             |                          |                 | [facility]     |              |
|             |                          |                 | CENTER -    |              |
|             |                          |                 | LABORATORY  |              |
+-------------+--------------------------+-----------------+-------------+--------------+
| K           | 3.4 (L)                  | 3.5 - 5.1       | [facility]  |              |
|             |                          | mmol/L          | [facility]    |              |
|             |                          |                 | MEDICAL     |              |
|             |                          |                 | CENTER -    |              |
|             |                          |                 | LABORATORY  |              |
+-------------+--------------------------+-----------------+-------------+--------------+
| Cl          | 111 (H)                  | 98 - 109 mmol/L | [facility]  |              |
|             |                          |                 | [facility]    |              |
|             |                          |                 | MEDICAL     |              |
|             |                          |                 | CENTER -    |              |
|             |                          |                 | LABORATORY  |              |
+-------------+--------------------------+-----------------+-------------+--------------+
| CO2         | 25                       | 24 - 31 mmol/L  | [facility]  |              |
|             |                          |                 | [facility]    |              |
|             |                          |                 | MEDICAL     |              |
|             |                          |                 | CENTER -    |              |
|             |                          |                 | LABORATORY  |              |
+-------------+--------------------------+-----------------+-------------+--------------+
| Anion Gap   | 8                        | 3 - 16 mmol/L   | [facility]  |              |
|             |                          |                 | [facility]    |              |
|             |                          |                 | MEDICAL     |              |
|             |                          |                 | CENTER -    |              |
|             |                          |                 | LABORATORY  |              |
+-------------+--------------------------+-----------------+-------------+--------------+
| Glucose     | 82                       | 70 - 109 mg/dL  | [facility]  |              |
|             |                          |                 | [facility]    |              |
|             |                          |                 | MEDICAL     |              |
|             |                          |                 | CENTER -    |              |
|             |                          |                 | LABORATORY  |              |
+-------------+--------------------------+-----------------+-------------+--------------+
| BUN         | 8                        | 7 - 18 mg/dL    | [facility]  |              |
|             |                          |                 | [facility]    |              |
|             |                          |                 | MEDICAL     |              |
|             |                          |                 | CENTER -    |              |
|             |                          |                 | LABORATORY  |              |
+-------------+--------------------------+-----------------+-------------+--------------+
| Creatinine  | 0.62                     | 0.60 - 1.30     | [facility]  |              |
|             |                          | mg/dL           | [facility]    |              |
|             |                          |                 | MEDICAL     |              |
|             |                          |                 | CENTER -    |              |
|             |                          |                 | LABORATORY  |              |
+-------------+--------------------------+-----------------+-------------+--------------+
| eGFR if not | >60Comment: GLOMERULAR   | >=60            | [facility]  |              |
|      | FILTRATION               | mL/min/1.73m2   | [facility]    |              |
| [employer]    | RATE,ESTIMATED           |                 | MEDICAL     |              |
|             |   mL/min/1.38z2Oirf than |                 | CENTER -    |              |
|             |  60    Chronic kidney    |                 | LABORATORY  |              |
|             | disease,if found over a  |                 |             |              |
 
|             | 3-month period.Less than |                 |             |              |
|             |  15    Kidney failureFor |                 |             |              |
|             |                   |                 |             |              |
|             | Americans,multiply the   |                 |             |              |
|             | calculated GFR by 1.21.  |                 |             |              |
|             |                          |                 |             |              |
+-------------+--------------------------+-----------------+-------------+--------------+
| Calcium     | 8.4                      | 8.3 - 10.5      | [facility]  |              |
|             |                          | mg/dL           | [facility]    |              |
|             |                          |                 | MEDICAL     |              |
|             |                          |                 | CENTER -    |              |
|             |                          |                 | LABORATORY  |              |
+-------------+--------------------------+-----------------+-------------+--------------+
| Albumin     | 3.4                      | 3.2 - 5.0 g/dL  | [facility]  |              |
|             |                          |                 | [facility]    |              |
|             |                          |                 | MEDICAL     |              |
|             |                          |                 | CENTER -    |              |
|             |                          |                 | LABORATORY  |              |
+-------------+--------------------------+-----------------+-------------+--------------+
| Bilirubin   | 0.6                      | 0.1 - 1.5 mg/dL | [facility]  |              |
| Total       |                          |                 | [facility]    |              |
|             |                          |                 | MEDICAL     |              |
|             |                          |                 | CENTER -    |              |
|             |                          |                 | LABORATORY  |              |
+-------------+--------------------------+-----------------+-------------+--------------+
| Total       | 5.6 (L)                  | 6.0 - 7.8 g/dL  | [facility]  |              |
| Protein     |                          |                 | [facility]    |              |
|             |                          |                 | MEDICAL     |              |
|             |                          |                 | CENTER -    |              |
|             |                          |                 | LABORATORY  |              |
+-------------+--------------------------+-----------------+-------------+--------------+
| AST         | 30                       | 10 - 42 U/L     | [facility]  |              |
|             |                          |                 | [facility]    |              |
|             |                          |                 | MEDICAL     |              |
|             |                          |                 | CENTER -    |              |
|             |                          |                 | LABORATORY  |              |
+-------------+--------------------------+-----------------+-------------+--------------+
| ALT         | 16                       | 6 - 45 U/L      | [facility]  |              |
|             |                          |                 | [facility]    |              |
|             |                          |                 | MEDICAL     |              |
|             |                          |                 | CENTER -    |              |
|             |                          |                 | LABORATORY  |              |
+-------------+--------------------------+-----------------+-------------+--------------+
| Alkaline    | 41                       | 40 - 110 U/L    | [facility]  |              |
| Phosphatase |                          |                 | [facility]    |              |
|             |                          |                 | MEDICAL     |              |
|             |                          |                 | CENTER -    |              |
|             |                          |                 | LABORATORY  |              |
+-------------+--------------------------+-----------------+-------------+--------------+
| Globulin    | 2.2                      | 2.1 - 3.8 g/dL  | [facility]  |              |
|             |                          |                 | [facility]    |              |
|             |                          |                 | MEDICAL     |              |
|             |                          |                 | CENTER -    |              |
|             |                          |                 | LABORATORY  |              |
+-------------+--------------------------+-----------------+-------------+--------------+
| Albumin/Carmela | 1.5                      | 0.8 - 2.0       | [facility]  |              |
| bulin Ratio |                          |                 | [facility]    |              |
|             |                          |                 | MEDICAL     |              |
|             |                          |                 | CENTER -    |              |
|             |                          |                 | LABORATORY  |              |
 
+-------------+--------------------------+-----------------+-------------+--------------+
| BUN/Creatin | 12.9                     |                 | [facility]  |              |
| ine Ratio   |                          |                 | [facility]    |              |
|             |                          |                 | MEDICAL     |              |
|             |                          |                 | CENTER -    |              |
|             |                          |                 | LABORATORY  |              |
+-------------+--------------------------+-----------------+-------------+--------------+
 
 
 
+----------+
| Specimen |
+----------+
| Blood    |
+----------+
 
 
 
+----------------------+--------------------+--------------------+----------------+
| Performing           | Address            | City/State/Zipcode | Phone Number   |
| Organization         |                    |                    |                |
+----------------------+--------------------+--------------------+----------------+
|   [facility]     |   [address] |   [place]  |   [phone] |
| [facility]  |                    | [address]              |                |
| - LABORATORY         |                    |                    |                |
+----------------------+--------------------+--------------------+----------------+
 Troponin I (2017  8:33 PM PST)
 
+------------+--------------------------+-------------+-------------+--------------+
| Component  | Value                    | Ref Range   | Performed   | Pathologist  |
|            |                          |             | At          | Signature    |
+------------+--------------------------+-------------+-------------+--------------+
| Troponin I | 2.14 ()Comment:        | <0.06 ng/mL | [facility]  |              |
|            | Critical Result called   |             | [facility]    |              |
|            | to and read back by      |             | MEDICAL     |              |
|            | Sivan Shin RN on  |             | CENTER -    |              |
|            | 2017 at 21:15 by     |             | LABORATORY  |              |
|            | Britney Coburn.          |             |             |              |
|            | Reference                |             |             |              |
|            | Ranges:0.00-0.06 =       |             |             |              |
|            | NORMAL>0.06       =      |             |             |              |
|            | SUSPICIOUS FOR           |             |             |              |
|            | MYOCARDIAL DAMAGE NOTE:  |             |             |              |
|            | Values greater than 0.50 |             |             |              |
|            |  ng/mL have been shown   |             |             |              |
|            | to be strongly           |             |             |              |
|            | associated with acute    |             |             |              |
|            | myocardial infarction.   |             |             |              |
|            | The [employer] College of  |             |             |              |
|            | Cardiology (ACC)         |             |             |              |
|            | recommends a decision    |             |             |              |
|            | limit of 0.06 ng/mL for  |             |             |              |
|            | this assay.   Results    |             |             |              |
|            | greater than 0.06 can    |             |             |              |
|            | reflect a pre-infarct    |             |             |              |
|            | acute coronary syndrome, |             |             |              |
|            |  but can also reflect    |             |             |              |
|            | myocardial necrosis or   |             |             |              |
|            | injury that is not due   |             |             |              |
|            | to coronary artery       |             |             |              |
 
|            | disease.   Some of these |             |             |              |
|            |  causes are sepsis,      |             |             |              |
|            | hypocolemia, atrial      |             |             |              |
|            | fibrillation, heart      |             |             |              |
|            | failure, pulmonary       |             |             |              |
|            | embolism, myocarditis,   |             |             |              |
|            | myocardial contusion,    |             |             |              |
|            | and renal failure.   The |             |             |              |
|            |  diagnosis of myocardial |             |             |              |
|            |  infarction should be    |             |             |              |
|            | based on a combination   |             |             |              |
|            | of the patient's         |             |             |              |
|            | clinical presentation    |             |             |              |
|            | and the clinical         |             |             |              |
|            | laboratory test results  |             |             |              |
|            | (especially serial       |             |             |              |
|            | troponin levels).        |             |             |              |
+------------+--------------------------+-------------+-------------+--------------+
 
 
 
+----------+
| Specimen |
+----------+
| Blood    |
+----------+
 
 
 
+----------------------+--------------------+--------------------+----------------+
| Performing           | Address            | City/State/Zipcode | Phone Number   |
| Organization         |                    |                    |                |
+----------------------+--------------------+--------------------+----------------+
|   [facility]     |   [address] |   [place], [place]  |   [phone] |
| [facility]  |                    | [address]              |                |
| - LABORATORY         |                    |                    |                |
+----------------------+--------------------+--------------------+----------------+
 CT Angiogram Chest Abdomen (2017  5:05 PM PST)
 
+----------+
| Specimen |
+----------+
|          |
+----------+
 
 
 
+------------------------------------------------------------------------+---------------+
| Narrative                                                              | Performed At  |
+------------------------------------------------------------------------+---------------+
|   CT ANGIOGRAM CHEST ABDOMEN W CONTRAST 2017 5:04 PM     HISTORY:  |   [facility] |
| Rule out dissection.     COMPARISON: None.     PROTOCOL: Axial images  |               |
| of the chest and abdomen were obtained before and after  uneventful    |               |
| administration of 85 mL Omnipaque 350. Coronal and sagittal            |               |
| reformations were acquired.     FINDINGS:  There is moderate           |               |
| atherosclerosis of the aorta extending into the branches. The          |               |
| ascending thoracic aorta is mildly ectatic and measures 3.8 cm. There  |               |
| is no  evidence for dissection. The celiac artery, SMA, and TWYLA are    |               |
| patent. The  bilateral renal arteries are normal. There is mild to     |               |
| moderate atherosclerosis  of the iliac arteries.     Neck base is      |               |
 
| normal. The heart is of normal size. The pulmonary arteries are        |               |
| unremarkable. SVC is normal. No enlarged lymph nodes are seen in the   |               |
| mediastinum, kourtney, or axilla. Trachea and esophagus are normal. There  |               |
| is mild  dependent atelectasis of the bilateral lungs.     The liver   |               |
| demonstrates normal parenchyma. The gallbladder is normal. Biliary     |               |
| ducts are unremarkable. The spleen is unremarkable. The pancreas       |               |
| demonstrates  normal parenchyma and a normal pancreatic duct. Adrenal  |               |
| glands are normal. The  right kidney and visualized ureter are normal. |               |
|  The left kidney and visualized  ureter are normal. Stomach is normal. |               |
|  Imaged small bowel and colon show no acute  findings. There is no     |               |
| significant abnormality in the portal veins, mesenteric  veins, or     |               |
| systemic veins. No enlarged lymph nodes are visualized within the      |               |
| omentum or retroperitoneum. There is no evidence for ascites or free   |               |
| air.     Body wall soft tissue structures are normal. There is mild    |               |
| S-shaped curvature of  the thoracolumbar spine. Mild to moderate       |               |
| spondylosis is present.     IMPRESSION -  No evidence for aortic       |               |
| dissection.     A preliminary report was sent by [facility] with  |               |
| no significant  discrepancy.     Dictated and Signed by: Tony Jacobs |               |
|  MD    Electronically signed: 2017 8:43 AM                         |               |
+------------------------------------------------------------------------+---------------+
 
 
 
+------------------------------------------------------------------------------------------+
| Procedure Note                                                                           |
+------------------------------------------------------------------------------------------+
|   Ace, Rad Results In - 2017  8:46 AM PST  CT ANGIOGRAM CHEST ABDOMEN W CONTRAST   |
| 2017 5:04 PMHISTORY: Rule out dissection.COMPARISON: None.PROTOCOL: Axial images of  |
| the chest and abdomen were obtained before and afteruneventful administration of 85 mL   |
| Omnipaque 350. Coronal and sagittalreformations were acquired.FINDINGS:There is moderate |
|  atherosclerosis of the aorta extending into the branches. Theascending thoracic aorta   |
| is mildly ectatic and measures 3.8 cm. There is noevidence for dissection. The celiac    |
| artery, SMA, and TWYLA are patent. Thebilateral renal arteries are normal. There is mild   |
| to moderate atherosclerosisof the iliac arteries.Neck base is normal. The heart is of    |
| normal size. The pulmonary arteries areunremarkable. SVC is normal. No enlarged lymph    |
| nodes are seen in themediastinum, kourtney, or axilla. Trachea and esophagus are normal.     |
| There is milddependent atelectasis of the bilateral lungs.The liver demonstrates normal  |
| parenchyma. The gallbladder is normal. Biliaryducts are unremarkable. The spleen is      |
| unremarkable. The pancreas demonstratesnormal parenchyma and a normal pancreatic duct.   |
| Adrenal glands are normal. Theright kidney and visualized ureter are normal. The left    |
| kidney and visualizedureter are normal. Stomach is normal. Imaged small bowel and colon  |
| show no acutefindings. There is no significant abnormality in the portal veins,          |
| mesentericveins, or systemic veins. No enlarged lymph nodes are visualized within        |
| theomentum or retroperitoneum. There is no evidence for ascites or free air.Body wall    |
| soft tissue structures are normal. There is mild S-shaped curvature ofthe thoracolumbar  |
| spine. Mild to moderate spondylosis is present.IMPRESSION -No evidence for aortic        |
| dissection.A preliminary report was sent by [facility] with no                      |
| significantdiscrepancy.Dictated and Signed by: Tony Jacobs MD  Electronically signed:   |
| 2017 8:43 AM                                                                         |
|ducts are unremarkable. The spleen is unremarkable. The pancreas demonstrates             |
|normal parenchyma and a normal pancreatic duct. Adrenal glands are normal. The            |
|right kidney and visualized ureter are normal. The left kidney and visualized             |
|ureter are normal. Stomach is normal. Imaged small bowel and colon show no acute          |
|findings. There is no significant abnormality in the portal veins, mesenteric            |
|veins, or systemic veins. No enlarged lymph nodes are visualized within the               |
|omentum or retroperitoneum. There is no evidence for ascites or free air.                 |
|                                                                                          |
|Body wall soft tissue structures are normal. There is mild S-shaped curvature of          |
|the thoracolumbar spine. Mild to moderate spondylosis is present.                         |
|                                                                                          |
 
|IMPRESSION -                                                                              |
|No evidence for aortic dissection.                                                        |
|                                                                                          |
|A preliminary report was sent by [facility] with no significant                      |
|discrepancy.                                                                             |
|                                                                                          |
|Dictated and Signed by: Tony Jacobs MD                                                   |
| Electronically signed: 2017 8:43 AM                                                  |
+------------------------------------------------------------------------------------------+
 
 
 
+---------------+---------+--------------------+--------------+
| Performing    | Address | City/State/Zipcode | Phone Number |
| Organization  |         |                    |              |
+---------------+---------+--------------------+--------------+
|   [facility] |         |                    |              |
+---------------+---------+--------------------+--------------+
 CV CARDIAC PROCEDURE (2017  4:26 PM PST)
 
+----------+
| Specimen |
+----------+
|          |
+----------+
 
 
 
+------------------------------------------------------------------------+--------------+
| Narrative                                                              | Performed At |
+------------------------------------------------------------------------+--------------+
|   This result has an attachment that is not available.  Anastasia CASTILLO         |              |
| MD S[place]       2017 19:10Sera Weeks is a 71 y.o. female      |              |
| patient.1. Acquired hypothyroidism   2. NSTEMI (non-ST elevated        |              |
| myocardial infarction) (HCC)   3. Pure hypercholesterolemia    Past    |              |
| Medical History Diagnosis Date                                         |              |
|   Hyperlipidemia                                                       |              |
|   Hypothyroidism                                                       |              |
|   Incontinence                                                         |              |
|   Depression    Blood pressure 124/78, pulse 63, temperature 36.2   C  |              |
| (97.2   F), resp. rate 19, height 1.6 m (5' 3"), weight 74.3 kg (163   |              |
| lb 12.8 oz), SpO2 90 %. CV Cardiac ProcedureDate/Time: 2017        |              |
| 17:10Performed by: ANASTASIA BRASHER AAuthorized by: ANASTASIA BRASHER       |              |
| AConsent: Verbal consent obtained. Written consent obtained.Risks and  |              |
| benefits: risks, benefits and alternatives were discussedConsent given |              |
|  by: patientPatient understanding: patient states understanding of the |              |
|  procedure being performedPatient consent: the patient's understanding |              |
|  of the procedure matches consent givenProcedure consent: procedure    |              |
| consent matches procedure scheduledRelevant documents: relevant        |              |
| documents present and verifiedTest results: test results available and |              |
|  properly labeledSite marked: the operative site was markedImaging     |              |
| studies: imaging studies availableRequired items: required blood       |              |
| products, implants, devices, and special equipment availablePatient    |              |
| identity confirmed: verbally with patient and arm bandTime out:        |              |
| Immediately prior to procedure a "time out" was called to verify the   |              |
| correct patient, procedure, equipment, support staff and site/side     |              |
| marked as required.Preparation: Patient was prepped and draped in the  |              |
| usual sterile fashion.Local anesthesia used: yesLocal anesthetic:      |              |
| lidocaine 1% without epinephrinePatient sedated: yesSedation type:     |              |
| moderate (conscious) sedationSedatives: fentanyl and midazolamVitals:  |              |
 
| Vital signs were monitored during sedation.Patient tolerance: Patient  |              |
| tolerated the procedure well with no immediate complications [place]  |              |
| [facility] [place] LEFT CARDIAC CATHETERIZATION REPORT  PATIENT NAME:      |              |
|   Sera Clifford OF BIRTH:    1945MEDICAL RECORD NUMBER:       |              |
|   [id number] OF PROCEDURE:    2017                            |              |
|                                                                        |              |
|                  PRIMARY CARE PROVIDER:   MAGGIE Anderson   |              |
| :    Anastasia Brasher MD, [facility], [facility] PRE-PROCEDURE DIAGNOSIS:   |              |
|    NSTEMIPOST-PROCEDURE DIAGNOSIS:   same PROCEDURES PERFORMED:        |              |
|   1.   Left Heart Catheterization for pressures2.   Coronary           |              |
| Angiography3.   Aortic Root  DESCRIPTION OF PROCEDURE: Informed        |              |
| consent was obtained from the patient, and a time-out was performed to |              |
|  verify the patient's identification and planned procedure.   Please   |              |
| refer to the computer log entry form for precise details.   The        |              |
| patient's right radial artery was sterilely prepped.   Arterial access |              |
|  was achieved with   micropuncture kit.   Patient was then given       |              |
| intravenous heparin as well as intra-arterial nitroglycerin and        |              |
| Nicardipine through the sheath.   A 5 French JR4 and a 3 DRC did not   |              |
| fit the coronary.   A pullback across the valve was used to assess if  |              |
| there was a gradient across the aortic valve A   JL 3.5 did not fit    |              |
| the left system nor did a LBU 3 did not fit.   A JL3 sat close to the  |              |
| anomalous RCA and also opacified the Left.   A pig tail was used to    |              |
| perform an aortic root. .   The TR band occluder device was utilized   |              |
| to achieve successful hemostasis of the radial artery.   There were no |              |
|  immediate complications.       Estimated blood loss was: 10 cc.       |              |
|   Fluro Time: 8.9 Min    Total Reference Air Kerma: 443.55 mGy         |              |
|   Contrast: 105 mls of Omnipaque 350   FINDINGS:Hemodynamics:Left      |              |
| ventricular end-diastolic pressure (LVEDP) was 14 mm Hg.      There    |              |
| was no gradient across the aortic valve. Left Ventriculogram:   Not    |              |
| performed Left main coronary artery:   Normal Left anterior descending |              |
|  coronary artery:   Large vessel.   No significant disease.Circumflex  |              |
| coronary artery:   Dominant.   Gives off 2 marginal and the PDA.   No  |              |
| significant disease.   10% in the AV groove.Right coronary artery:     |              |
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
| cusp.   Minimal luminal irregularity. Aortic Root:   Enlarged No AI No |              |
|  obvious dissection Possible small aortic ulceration CONCLUSIONS:1.    |              |
| Dilated Aortic Root2. No AI3. Normal Left Main4. Normal LAD5. Dominant |              |
|  LCX.   10% luminal irregularity in AV groove.   Gives off the PDA6.   |              |
| Non dominant RCA.   Mild plaque.   Arises anomalously from the left    |              |
| cusp CLINICAL IMPRESSION AND RECOMMENDATIONS Will need CT of aorta to  |              |
| rule out dissectionEcho for LV function  Anastasia Brasher MD, FACC,      |              |
| FSCAIProvidence Fulton County Medical CenterDATE/TIME:   2017           |              |
| 17:111 17:11 Portions of this chart were created with Dragon    |              |
| voice recognition software. Occasional wrong-word or                   |              |
|                                                                        |              |
| sound-alike                                                            |              |
|   substitutions may have occurred due to the inherent limitations of   |              |
| voice recognition software. Please read the chart carefully and        |              |
| recognize, using context, where these substitutions have occurred      |              |
| Anastasia Brasher2017                                                |              |
|prepped.   Arterial access was achieved with   micropuncture kit.      |              |
|Patient was then given intravenous heparin as well as                   |              |
|intra-arterial nitroglycerin and Nicardipine through the sheath.        |              |
|A 5 French JR4 and a 3 DRC did not fit the coronary.   A pullback       |              |
|across the valve was used to assess if there was a gradient             |              |
|across the aortic valve A   JL 3.5 did not fit the left system nor      |              |
|did a LBU 3 did not fit.   A JL3 sat close to the anomalous RCA         |              |
|and also opacified the Left.   A pig tail was used to perform an        |              |
|aortic root. .                                                          |              |
|The TR band occluder device was utilized to achieve successful          |              |
 
|hemostasis of the radial artery.   There were no immediate              |              |
|complications.                                                         |              |
|                                                                        |              |
|   Estimated blood loss was: 10 cc.                                     |              |
|   Fluro Time: 8.9 Min                                                  |              |
|   Total Reference Air Kerma: 443.55 mGy                                |              |
|   Contrast: 105 mls of Omnipaque 350                                   |              |
|                                                                        |              |
|FINDINGS:                                                              |              |
|Hemodynamics:                                                          |              |
|Left ventricular end-diastolic pressure (LVEDP) was 14 mm Hg.           |              |
|There was no gradient across the aortic valve.                          |              |
|                                                                        |              |
|Left Ventriculogram:   Not performed                                    |              |
|Left main coronary artery:   Normal                                     |              |
|Left anterior descending coronary artery:   Large vessel.   No          |              |
|significant disease.                                                    |              |
|Circumflex coronary artery:   Dominant.   Gives off 2 marginal and      |              |
|the PDA.   No significant disease.   10% in the AV groove.              |              |
|Right coronary artery:   Small non dominant vessel anomalous            |              |
|origin from the left coronary cusp.   Minimal luminal                   |              |
|irregularity.                                                          |              |
|                                                                        |              |
|Aortic Root:                                                            |              |
|Enlarged                                                                |              |
|No AI                                                                   |              |
|No obvious dissection                                                   |              |
|Possible small aortic ulceration                                        |              |
|CONCLUSIONS:                                                           |              |
|1. Dilated Aortic Root                                                  |              |
|2. No AI                                                                |              |
|3. Normal Left Main                                                     |              |
|4. Normal LAD                                                           |              |
|5. Dominant LCX.   10% luminal irregularity in AV groove.   Gives       |              |
|off the PDA                                                             |              |
|6. Non dominant RCA.   Mild plaque.   Arises anomalously from the       |              |
|left cusp                                                               |              |
|                                                                        |              |
|CLINICAL IMPRESSION AND RECOMMENDATIONS                                 |              |
|Will need CT of aorta to rule out dissection                            |              |
|Echo for LV function                                                    |              |
|                                                                        |              |
|                                                                        |              |
|Anastasia Brasher MD, [facility], FSCAI                                          |              |
|[facility]                                      |              |
|DATE/TIME:   2017 17:11                                             |              |
|2017 17:11                                                          |              |
|                                                                        |              |
|Portions of this chart were created with Dragon voice recognition       |              |
|software. Occasional wrong-word or    sound-alike    substitutions     |              |
|may have occurred due to the inherent limitations of voice              |              |
|recognition software. Please read the chart carefully and               |              |
|recognize, using context, where these substitutions have occurred       |              |
|                                                                        |              |
|                                                                        |              |
|Anastasia Brasher                                                         |              |
|2017                                                                |              |
|                                                                        |              |
 
+------------------------------------------------------------------------+--------------+
 CT Head w wo Contrast (2017  2:23 PM PST)
 
+----------+
| Specimen |
+----------+
|          |
+----------+
 
 
 
+------------------------------------------------------------------------+---------------+
| Narrative                                                              | Performed At  |
+------------------------------------------------------------------------+---------------+
|   UNENHANCED AND ENHANCED HEAD CT 2017 2:23 PM     CLINICAL        |   [facility] |
| HISTORY:   pain behind right eye           COMPARISON: None available  |               |
|     TECHNIQUE: Axial images are performed through the head both before |               |
|  and after the  uneventful intravenous administration of 80 cc         |               |
| Omnipaque 350 contrast.   Coronal  and sagittal reformations are also  |               |
| performed.      FINDINGS:   There is mild cerebral atrophy and mild,   |               |
| patchy hypoattenuation  involving the deep and periventricular         |               |
| cerebral white matter.   Gray-white  differentiation is maintained.    |               |
|   The ventricles, brainstem and cerebellum are  unremarkable.   There  |               |
| is no mass effect, abnormal enhancement, evidence of  intracranial     |               |
| hemorrhage or extra-axial fluid collection/mass.   Bilateral           |               |
| prosthetic ocular lenses are present.   The orbital contents are       |               |
| otherwise  symmetric and unremarkable, without visible mass lesion or  |               |
| proptosis.   There is  mild mucous membrane thickening within          |               |
| bilateral ethmoid air cells.   Imaged  paranasal sinuses are otherwise |               |
|  well aerated, along with the middle ear cavities  and imaged after    |               |
| air cells.   Asymmetric degeneration of the left greater than  right   |               |
| temporomandibular joints is noted.     There is calcified plaque       |               |
| within the cavernous segments of the internal carotid  arteries.       |               |
|   Major intracranial vessels and dural sinuses otherwise appear patent |               |
|   and grossly unremarkable.   No conclusive aneurysm is visible.       |               |
| IMPRESSION -     1.   NO VISIBLE MASS, ANEURYSM OR OTHER POTENTIAL     |               |
| ETIOLOGY FOR RETRO-ORBITAL  PAIN.     2.   MILD CEREBRAL ATROPHY AND   |               |
| PROBABLE CHRONIC, MICROVASCULAR ISCHEMIC CHANGE OF  THE CEREBRAL WHITE |               |
|  MATTER.     3.   MILD ETHMOID SINUS DISEASE.     4.   ASYMMETRIC      |               |
| DEGENERATION OF THE TEMPOROMANDIBULAR JOINTS.     Dictated and Signed  |               |
| by: Андрей Payton MD    Electronically signed: 2017 2:36 PM        |               |
+------------------------------------------------------------------------+---------------+
 
 
 
+------------------------------------------------------------------------------------------+
| Procedure Note                                                                           |
+------------------------------------------------------------------------------------------+
|   Ace, Rad Results In - 2017  2:39 PM PST  UNENHANCED AND ENHANCED HEAD CT         |
| 2017 2:23 PM CLINICAL HISTORY:  pain behind right eye     COMPARISON: None available |
|  TECHNIQUE: Axial images are performed through the head both before and after            |
| theuneventful intravenous administration of 80 cc Omnipaque 350 contrast.  Coronaland    |
| sagittal reformations are also performed.  FINDINGS:  There is mild cerebral atrophy and |
|  mild, patchy hypoattenuationinvolving the deep and periventricular cerebral white       |
| matter.  Gray-whitedifferentiation is maintained.  The ventricles, brainstem and         |
| cerebellum areunremarkable.  There is no mass effect, abnormal enhancement, evidence     |
| ofintracranial hemorrhage or extra-axial fluid collection/mass.  Bilateralprosthetic     |
| ocular lenses are present.  The orbital contents are otherwisesymmetric and              |
| unremarkable, without visible mass lesion or proptosis.  There ismild mucous membrane    |
| thickening within bilateral ethmoid air cells.  Imagedparanasal sinuses are otherwise    |
 
| well aerated, along with the middle ear cavitiesand imaged after air cells.  Asymmetric  |
| degeneration of the left greater thanright temporomandibular joints is noted.There is    |
| calcified plaque within the cavernous segments of the internal carotidarteries.  Major   |
| intracranial vessels and dural sinuses otherwise appear patentand grossly unremarkable.  |
|  No conclusive aneurysm is visible. IMPRESSION -  1.  NO VISIBLE MASS, ANEURYSM OR OTHER |
|  POTENTIAL ETIOLOGY FOR RETRO-ORBITALPAIN.2.  MILD CEREBRAL ATROPHY AND PROBABLE         |
| CHRONIC, MICROVASCULAR ISCHEMIC CHANGE OFTHE CEREBRAL WHITE MATTER.3.  MILD ETHMOID      |
| SINUS DISEASE.4.  ASYMMETRIC DEGENERATION OF THE TEMPOROMANDIBULAR JOINTS.Dictated and   |
| Signed by: Андрей Payton MD  Electronically signed: 2017 2:36 PM                      |
|                                                                                          |
|There is calcified plaque within the cavernous segments of the internal carotid           |
|arteries.  Major intracranial vessels and dural sinuses otherwise appear patent          |
|and grossly unremarkable.  No conclusive aneurysm is visible.                             |
|                                                                                          |
|IMPRESSION -                                                                              |
|1.  NO VISIBLE MASS, ANEURYSM OR OTHER POTENTIAL ETIOLOGY FOR RETRO-ORBITAL               |
|PAIN.                                                                                    |
|                                                                                          |
|2.  MILD CEREBRAL ATROPHY AND PROBABLE CHRONIC, MICROVASCULAR ISCHEMIC CHANGE OF          |
|THE CEREBRAL WHITE MATTER.                                                                |
|                                                                                          |
|3.  MILD ETHMOID SINUS DISEASE.                                                           |
|                                                                                          |
|4.  ASYMMETRIC DEGENERATION OF THE TEMPOROMANDIBULAR JOINTS.                              |
|                                                                                          |
|Dictated and Signed by: Андрей Payton MD                                                   |
| Electronically signed: 2017 2:36 PM                                                  |
+------------------------------------------------------------------------------------------+
 
 
 
+---------------+---------+--------------------+--------------+
| Performing    | Address | City/State/Zipcode | Phone Number |
| Organization  |         |                    |              |
+---------------+---------+--------------------+--------------+
|   [facility] |         |                    |              |
+---------------+---------+--------------------+--------------+
 ECG 12 lead (2017 12:50 PM PST)
 
+-------------+--------------------------+-----------+------------+--------------+
| Component   | Value                    | Ref Range | Performed  | Pathologist  |
|             |                          |           | At         | Signature    |
+-------------+--------------------------+-----------+------------+--------------+
| VENTRICULAR | 57                       | BPM       | [facility]  |              |
|  RATE EKG   |                          |           |            |              |
+-------------+--------------------------+-----------+------------+--------------+
| ATRIAL RATE | 57                       | BPM       | [facility]  |              |
+-------------+--------------------------+-----------+------------+--------------+
| P-R         | 146                      | ms        | [facility]  |              |
| INTERVAL    |                          |           |            |              |
+-------------+--------------------------+-----------+------------+--------------+
| QRS         | 78                       | ms        | [facility]  |              |
| DURATION    |                          |           |            |              |
+-------------+--------------------------+-----------+------------+--------------+
| Q-T         | 450                      | ms        | [facility]  |              |
| INTERVAL    |                          |           |            |              |
+-------------+--------------------------+-----------+------------+--------------+
| Q-T         | 438                      | ms        | [facility]  |              |
| INTERVAL    |                          |           |            |              |
| (CORRECTED) |                          |           |            |              |
 
+-------------+--------------------------+-----------+------------+--------------+
| P WAVE AXIS | 34                       | degrees   | [facility]  |              |
+-------------+--------------------------+-----------+------------+--------------+
| QRS AXIS    | -2                       | degrees   | [facility]  |              |
+-------------+--------------------------+-----------+------------+--------------+
| T AXIS      | 56                       | degrees   | [facility]  |              |
+-------------+--------------------------+-----------+------------+--------------+
| INTERPRETAT | Sinus bradycardiaLow     |           | [facility]  |              |
| ION TEXT    | voltage QRSNonspecific   |           |            |              |
|             | ST abnormalityBorderline |           |            |              |
|             |  ECGNo previous ECGs     |           |            |              |
|             | availableConfirmed by    |           |            |              |
|             | ANASTASIA BRASHER MD        |           |            |              |
|             | ([id number]) on 2017      |           |            |              |
|             | 1:32:01 PM               |           |            |              |
+-------------+--------------------------+-----------+------------+--------------+
 
 
 
+----------+
| Specimen |
+----------+
|          |
+----------+
 
 
 
+----------------------------------------------------------+--------------+
| Narrative                                                | Performed At |
+----------------------------------------------------------+--------------+
|   This result has an attachment that is not available.   |              |
+----------------------------------------------------------+--------------+
 
 
 
+--------------+---------+--------------------+--------------+
| Performing   | Address | City/State/Zipcode | Phone Number |
| Organization |         |                    |              |
+--------------+---------+--------------------+--------------+
|   [facility]  |         |                    |              |
+--------------+---------+--------------------+--------------+
 PTT (2017 12:44 PM PST)
 
+-----------+-------+-----------------+-------------+--------------+
| Component | Value | Ref Range       | Performed   | Pathologist  |
|           |       |                 | At          | Signature    |
+-----------+-------+-----------------+-------------+--------------+
| aPTT      | 32    | 22 - 36 seconds | [facility]  |              |
|           |       |                 | [facility]    |              |
|           |       |                 | [facility]     |              |
|           |       |                 | [facility] -    |              |
|           |       |                 | LABORATORY  |              |
+-----------+-------+-----------------+-------------+--------------+
 
 
 
+----------+
| Specimen |
+----------+
| Blood    |
 
+----------+
 
 
 
+----------------------+--------------------+--------------------+----------------+
| Performing           | Address            | City/State/Zipcode | Phone Number   |
| Organization         |                    |                    |                |
+----------------------+--------------------+--------------------+----------------+
|   [facility]     |   [address] |   [place]  |   [phone] |
| [facility]  |                    | [address]              |                |
| - LABORATORY         |                    |                    |                |
+----------------------+--------------------+--------------------+----------------+
 Basic Metabolic Panel (2017 12:44 PM PST)
 
+-------------+--------------------------+-----------------+-------------+--------------+
| Component   | Value                    | Ref Range       | Performed   | Pathologist  |
|             |                          |                 | At          | Signature    |
+-------------+--------------------------+-----------------+-------------+--------------+
| Na          | 141                      | 136 - 149       | [facility]  |              |
|             |                          | mmol/L          | [facility]    |              |
|             |                          |                 | MEDICAL     |              |
|             |                          |                 | CENTER -    |              |
|             |                          |                 | LABORATORY  |              |
+-------------+--------------------------+-----------------+-------------+--------------+
| K           | 3.7                      | 3.5 - 5.1       | [facility]  |              |
|             |                          | mmol/L          | [facility]    |              |
|             |                          |                 | MEDICAL     |              |
|             |                          |                 | CENTER -    |              |
|             |                          |                 | LABORATORY  |              |
+-------------+--------------------------+-----------------+-------------+--------------+
| Cl          | 105                      | 98 - 109 mmol/L | [facility]  |              |
|             |                          |                 | [facility]    |              |
|             |                          |                 | MEDICAL     |              |
|             |                          |                 | CENTER -    |              |
|             |                          |                 | LABORATORY  |              |
+-------------+--------------------------+-----------------+-------------+--------------+
| CO2         | 30                       | 24 - 31 mmol/L  | [facility]  |              |
|             |                          |                 | [facility]    |              |
|             |                          |                 | [facility]     |              |
|             |                          |                 | C[facility] -    |              |
|             |                          |                 | LABORATORY  |              |
+-------------+--------------------------+-----------------+-------------+--------------+
| Anion Gap   | 6                        | 3 - 16 mmol/L   | [facility]  |              |
|             |                          |                 | [facility]    |              |
|             |                          |                 | [facility]     |              |
|             |                          |                 | CENTER -    |              |
|             |                          |                 | LABORATORY  |              |
+-------------+--------------------------+-----------------+-------------+--------------+
| Glucose     | 85                       | 70 - 109 mg/dL  | [facility]  |              |
|             |                          |                 | [facility]    |              |
|             |                          |                 | [facility]     |              |
|             |                          |                 | CENTER -    |              |
|             |                          |                 | LABORATORY  |              |
+-------------+--------------------------+-----------------+-------------+--------------+
| BUN         | 13                       | 7 - 18 mg/dL    | [facility]  |              |
|             |                          |                 | [facility]    |              |
|             |                          |                 | MEDICAL     |              |
|             |                          |                 | CENTER -    |              |
|             |                          |                 | LABORATORY  |              |
+-------------+--------------------------+-----------------+-------------+--------------+
 
| Creatinine  | 0.54 (L)                 | 0.60 - 1.30     | [facility]  |              |
|             |                          | mg/dL           |  [facility]    |              |
|             |                          |                 | MED[facility]     |              |
|             |                          |                 | CENTER -    |              |
|             |                          |                 | LABORATORY  |              |
+-------------+--------------------------+-----------------+-------------+--------------+
| eGFR if not | >60Comment: GLOMERULAR   | >=60            | [facility]  |              |
|      | FILTRATION               | mL/min/1.73m2   | [facility]    |              |
| [employer]    | RATE,ESTIMATED           |                 | MEDICAL     |              |
|             |   mL/min/1.46g4Cnuv than |                 | CENTER -    |              |
|             |  60    Chronic kidney    |                 | LABORATORY  |              |
|             | disease,if found over a  |                 |             |              |
|             | 3-month period.Less than |                 |             |              |
|             |  15    Kidney failureFor |                 |             |              |
|             |                   |                 |             |              |
|             | Americans,multiply the   |                 |             |              |
|             | calculated GFR by 1.21.  |                 |             |              |
|             |                          |                 |             |              |
+-------------+--------------------------+-----------------+-------------+--------------+
| Calcium     | 9.1                      | 8.3 - 10.5      | [facility]  |              |
|             |                          | mg/dL           |  [facility]    |              |
|             |                          |                 | MEDICAL     |              |
|             |                          |                 | CENTER -    |              |
|             |                          |                 | LABORATORY  |              |
+-------------+--------------------------+-----------------+-------------+--------------+
| BUN/Creatin | 24.1                     |                 | [facility]  |              |
| ine Ratio   |                          |                 | [facility]    |              |
|             |                          |                 | MEDICAL     |              |
|             |                          |                 | CENTER -    |              |
|             |                          |                 | LABORATORY  |              |
+-------------+--------------------------+-----------------+-------------+--------------+
 
 
 
+----------+
| Specimen |
+----------+
| Blood    |
+----------+
 
 
 
+----------------------+--------------------+--------------------+----------------+
| Performing           | Address            | City/State/Zipcode | Phone Number   |
| Organization         |                    |                    |                |
+----------------------+--------------------+--------------------+----------------+
|   [facility]     |   [address] |   [place]  |   [phone] |
| [facility]  |                    | [address]              |                |
| - LABORATORY         |                    |                    |                |
+----------------------+--------------------+--------------------+----------------+
 CBC no Differential (2017 12:44 PM PST)
 
+-------------+---------+-----------------+-------------+--------------+
| Component   | Value   | Ref Range       | Performed   | Pathologist  |
|             |         |                 | At          | Signature    |
+-------------+---------+-----------------+-------------+--------------+
| White Blood | 4.2     | 4.0 - 11.0 K/uL | [facility]  |              |
|  Cells      |         |                 | [facility]    |              |
|             |         |                 | MEDICAL     |              |
|             |         |                 | CENTER -    |              |
 
|             |         |                 | LABORATORY  |              |
+-------------+---------+-----------------+-------------+--------------+
| Red Blood   | 4.33    | 3.70 - 5.20     | [facility]  |              |
| Cells       |         | M/uL            | [facility]    |              |
|             |         |                 | MEDICAL     |              |
|             |         |                 | CENTER -    |              |
|             |         |                 | LABORATORY  |              |
+-------------+---------+-----------------+-------------+--------------+
| Hemoglobin  | 13.9    | 11.5 - 16.0     | [facility]  |              |
|             |         | g/dL            | [facility]    |              |
|             |         |                 | MEDICAL     |              |
|             |         |                 | CENTER -    |              |
|             |         |                 | LABORATORY  |              |
+-------------+---------+-----------------+-------------+--------------+
| Hematocrit  | 41.2    | 34.0 - 47.0 %   | [facility]  |              |
|             |         |                 | [facility]    |              |
|             |         |                 | MEDICAL     |              |
|             |         |                 | CENTER -    |              |
|             |         |                 | LABORATORY  |              |
+-------------+---------+-----------------+-------------+--------------+
| MCV         | 95.0    | 83.0 - 101.0 fL | [facility]  |              |
|             |         |                 | [facility]    |              |
|             |         |                 | MEDICAL     |              |
|             |         |                 | CENTER -    |              |
|             |         |                 | LABORATORY  |              |
+-------------+---------+-----------------+-------------+--------------+
| MCH         | 32.2    | 28.0 - 35.0 pg  | [facility]  |              |
|             |         |                 | [facility]    |              |
|             |         |                 | MEDICAL     |              |
|             |         |                 | CENTER -    |              |
|             |         |                 | LABORATORY  |              |
+-------------+---------+-----------------+-------------+--------------+
| MCHC        | 33.9    | 32.0 - 36.0     | [facility]  |              |
|             |         | g/dL            | [facility]    |              |
|             |         |                 | MEDICAL     |              |
|             |         |                 | CENTER -    |              |
|             |         |                 | LABORATORY  |              |
+-------------+---------+-----------------+-------------+--------------+
| RDW-CV      | 12.8    | <15.0 %         | [facility]  |              |
|             |         |                 | [facility]    |              |
|             |         |                 | MEDICAL     |              |
|             |         |                 | CENTER -    |              |
|             |         |                 | LABORATORY  |              |
+-------------+---------+-----------------+-------------+--------------+
| Platelet    | 129 (L) | 140 - 440 K/uL  | [facility]  |              |
| Count       |         |                 | [facility]    |              |
|             |         |                 | MEDICAL     |              |
|             |         |                 | CENTER -    |              |
|             |         |                 | LABORATORY  |              |
+-------------+---------+-----------------+-------------+--------------+
| MPV         | 10.1    | fL              | [facility]  |              |
|             |         |                 | [facility]    |              |
|             |         |                 | MEDICAL     |              |
|             |         |                 | CENTER -    |              |
|             |         |                 | LABORATORY  |              |
+-------------+---------+-----------------+-------------+--------------+
 
 
 
+----------+
 
| Specimen |
+----------+
| Blood    |
+----------+
 
 
 
+----------------------+--------------------+--------------------+----------------+
| Performing           | Address            | City/State/Zipcode | Phone Number   |
| Organization         |                    |                    |                |
+----------------------+--------------------+--------------------+----------------+
|   [facility]     |   [address] |   [place]  |   [phone] |
| [facility]  |                    | [address]              |                |
| - LABORATORY         |                    |                    |                |
+----------------------+--------------------+--------------------+----------------+
 Troponin I (2017 12:44 PM PST)
 
+------------+--------------------------+-------------+-------------+--------------+
| Component  | Value                    | Ref Range   | Performed   | Pathologist  |
|            |                          |             | At          | Signature    |
+------------+--------------------------+-------------+-------------+--------------+
| Troponin I | 2.01 ()Comment:        | <0.06 ng/mL | [facility]  |              |
|            | Critical Result called   |             | [facility]    |              |
|            | to and read back by      |             | MEDICAL     |              |
|            | [facility] on   |             | CENTER -    |              |
|            | 2017 at 13:16 by     |             | LABORATORY  |              |
|            | Joe Tarango.          |             |             |              |
|            | Reference                |             |             |              |
|            | Ranges:0.00-0.06 =       |             |             |              |
|            | NORMAL>0.06       =      |             |             |              |
|            | SUSPICIOUS FOR           |             |             |              |
|            | MYOCARDIAL DAMAGE NOTE:  |             |             |              |
|            | Values greater than 0.50 |             |             |              |
|            |  ng/mL have been shown   |             |             |              |
|            | to be strongly           |             |             |              |
|            | associated with acute    |             |             |              |
|            | myocardial infarction.   |             |             |              |
|            | The [employer] College of  |             |             |              |
|            | Cardiology (ACC)         |             |             |              |
|            | recommends a decision    |             |             |              |
|            | limit of 0.06 ng/mL for  |             |             |              |
|            | this assay.   Results    |             |             |              |
|            | greater than 0.06 can    |             |             |              |
|            | reflect a pre-infarct    |             |             |              |
|            | acute coronary syndrome, |             |             |              |
|            |  but can also reflect    |             |             |              |
|            | myocardial necrosis or   |             |             |              |
|            | injury that is not due   |             |             |              |
|            | to coronary artery       |             |             |              |
|            | disease.   Some of these |             |             |              |
|            |  causes are sepsis,      |             |             |              |
|            | hypocolemia, atrial      |             |             |              |
|            | fibrillation, heart      |             |             |              |
|            | failure, pulmonary       |             |             |              |
|            | embolism, myocarditis,   |             |             |              |
|            | myocardial contusion,    |             |             |              |
|            | and renal failure.   The |             |             |              |
|            |  diagnosis of myocardial |             |             |              |
|            |  infarction should be    |             |             |              |
|            | based on a combination   |             |             |              |
 
|            | of the patient's         |             |             |              |
|            | clinical presentation    |             |             |              |
|            | and the clinical         |             |             |              |
|            | laboratory test results  |             |             |              |
|            | (especially serial       |             |             |              |
|            | troponin levels).        |             |             |              |
+------------+--------------------------+-------------+-------------+--------------+
 
 
 
+----------+
| Specimen |
+----------+
| Blood    |
+----------+
 
 
 
+----------------------+--------------------+--------------------+----------------+
| Performing           | Address            | City/State/Zipcode | Phone Number   |
| Organization         |                    |                    |                |
+----------------------+--------------------+--------------------+----------------+
|   [facility]     |   [address] |   [place]  |   [phone] |
| [facility]  |                    | [address]              |                |
| - LABORATORY         |                    |                    |                |
+----------------------+--------------------+--------------------+----------------+
 XR Chest PA or AP (2017  4:55 AM PST)
 
+----------+
| Specimen |
+----------+
|          |
+----------+
 
 
 
+--------------------------------------------------------------------+---------------+
| Narrative                                                          | Performed At  |
+--------------------------------------------------------------------+---------------+
|   External films for comparison only - no result from [facility]  |   [facility] |
+--------------------------------------------------------------------+---------------+
 
 
 
+---------------+---------+--------------------+--------------+
| Performing    | Address | City/State/Zipcode | Phone Number |
| Organization  |         |                    |              |
+---------------+---------+--------------------+--------------+
|   [facility] |         |                    |              |
+---------------+---------+--------------------+--------------+
 ECG - EXTERNAL SCAN (2017 12:00 AM PST)
 
+------------------------------------------------------------------------+--------------+
| Narrative                                                              | Performed At |
+------------------------------------------------------------------------+--------------+
|   This result has an attachment that is not available.  Ordered by an  |              |
| unspecified provider.                                                  |              |
+------------------------------------------------------------------------+--------------+
 documented in this encounter
 
 
 Visit Diagnoses
 Not on filedocumented in this encounter
 
 Administered Medications
 
 
+-----------------------------------+--------+----------+--------+------+------+
| Medication Order                  | MAR    | Action   | Dose   | Rate | Site |
|                                   | Action | Date     |        |      |      |
+-----------------------------------+--------+----------+--------+------+------+
|   fentaNYL (PF) injection  ONCE   | Given  | 20 | 25 mcg |      |      |
| PRN, Starting Sun 17 at 1536, |        | 17  3:41 |        |      |      |
|  Intra-op                         |        |  PM PST  |        |      |      |
+-----------------------------------+--------+----------+--------+------+------+
 
 
 
+-------+----------+--------+---+---+
| Given | 20 | 50 mcg |   |   |
|       | 17  3:36 |        |   |   |
|       |  PM PST  |        |   |   |
+-------+----------+--------+---+---+
 
 
 
+---+---+
|   |   |
+---+---+
 
 
 
+--------------------------------+-------+----------+---------+---+---+
|   iohexol (OMNIPAQUE 350) 350  | Given | 20 | 105 mLs |   |   |
| mg/mL injection  ONCE PRN,     |       | 17  4:11 |         |   |   |
| Starting Sun 17 at 1611,   |       |  PM PST  |         |   |   |
| Intra-op                       |       |          |         |   |   |
+--------------------------------+-------+----------+---------+---+---+
 
 
 
+---+---+
|   |   |
+---+---+
 
 
 
+-----------------------------------+-------+----------+-------+---+----------+
|   lidocaine buffered 1% injection | Given | 20 | 4 mLs |   | Surgical |
|   ONCE PRN, Starting Sun 17   |       | 17  3:39 |       |   |  Site    |
| at 1539, Intra-op                 |       |  PM PST  |       |   |          |
+-----------------------------------+-------+----------+-------+---+----------+
 
 
 
+---+---+
|   |   |
+---+---+
 
 
 
 
+-----------------------------------+-------+----------+--------+---+---+
|   midazolam (VERSED) 1 mg/mL      | Given | 20 | 0.5 mg |   |   |
| injection  ONCE PRN, Starting Sun |       | 17  3:41 |        |   |   |
|  17 at 1536, Intra-op         |       |  PM PST  |        |   |   |
+-----------------------------------+-------+----------+--------+---+---+
 
 
 
+-------+----------+--------+---+---+
| Given | 20 | 0.5 mg |   |   |
|       | 17  3:38 |        |   |   |
|       |  PM PST  |        |   |   |
+-------+----------+--------+---+---+
| Given | 20 | 0.5 mg |   |   |
|       | 17  3:36 |        |   |   |
|       |  PM PST  |        |   |   |
+-------+----------+--------+---+---+
 
 
 
+---+---+
|   |   |
+---+---+
 
 
 
+-----------------------------------+-------+----------+---------+---+---+
|   niCARdipine in dextrose         | Given | 20 | 200 mcg |   |   |
| (CARDENE) 0.2 mg/ml syringe  ONCE |       | 17  3:56 |         |   |   |
|  PRN, Starting Sun 17 at      |       |  PM PST  |         |   |   |
| 1545, Intra-op                    |       |          |         |   |   |
+-----------------------------------+-------+----------+---------+---+---+
 
 
 
+-------+----------+---------+---+---+
| Given | 20 | 200 mcg |   |   |
|       | 17  3:48 |         |   |   |
|       |  PM PST  |         |   |   |
+-------+----------+---------+---+---+
| Given | 20 | 200 mcg |   |   |
|       | 17  3:45 |         |   |   |
|       |  PM PST  |         |   |   |
+-------+----------+---------+---+---+
 
 
 
+---+---+
|   |   |
+---+---+
 documented in this encounter

## 2020-07-23 NOTE — XMS
Clinical Summary
  Created on: 2020
 
 Sera Weeks Kylah
 External Reference #: [id number]
 : [birth year]
 Sex: Female
 
 Demographics
 
 
+-----------------------+----------------------+
| Address               | [address]         |
|                       | [place]  [address] |
+-----------------------+----------------------+
| Home Phone            | [phone]      |
+-----------------------+----------------------+
| Preferred Language    | Unknown              |
+-----------------------+----------------------+
| Marital Status        |               |
+-----------------------+----------------------+
| Faith Affiliation | Unknown              |
+-----------------------+----------------------+
| Race                  | Unknown              |
+-----------------------+----------------------+
| Ethnic Group          | Unknown              |
+-----------------------+----------------------+
 
 
 Author
 
 
+--------------+--------------------------------------------+
| Author       | [facility]  |
|              | [facility]                                |
+--------------+--------------------------------------------+
| Organization | [facility]  |
|              | and [facility]                                |
+--------------+--------------------------------------------+
| Address      | Unknown                                    |
+--------------+--------------------------------------------+
| Phone        | Unavailable                                |
+--------------+--------------------------------------------+
 
 
 
 Support
 
 
+-------------------+--------------+---------+-----------------+
| Name              | Relationship | Address | Phone           |
+-------------------+--------------+---------+-----------------+
| Melissa Lau | ECON         | Unknown | [phone] |
+-------------------+--------------+---------+-----------------+
| Mike Lau   | ECON         | Unknown | [phone] |
+-------------------+--------------+---------+-----------------+
| Damaris Jonna     | ECON         | Unknown | [phone] |
+-------------------+--------------+---------+-----------------+
 
 
 
 
 Care Team Providers
 
 
+-----------------------+------+-----------------+
| Care Team Member Name | Role | Phone           |
+-----------------------+------+-----------------+
| Chandra King MD | PCP  | [phone] |
+-----------------------+------+-----------------+
 
 
 
 Allergies
 
 
+----------------+-----------+----------+----------+----------+
| Active Allergy | Reactions | Severity | Noted    | Comments |
|                |           |          | Date     |          |
+----------------+-----------+----------+----------+----------+
| Codeine        | Itching   | Medium   | 20 |          |
|                |           |          | 17       |          |
+----------------+-----------+----------+----------+----------+
 
 
 
 Medications
 
 
+----------------------+----------------------+-----------+---------+------+------+-------+
| Medication           | Sig                  | Dispensed | Refills | Star | End  | Statu |
|                      |                      |           |         | t    | Date | s     |
|                      |                      |           |         | Date |      |       |
+----------------------+----------------------+-----------+---------+------+------+-------+
|   aspirin 81 mg EC   | Take 81 mg by mouth  |           | 0       |      |      | Activ |
| tablet               | Daily.               |           |         |      |      | e     |
+----------------------+----------------------+-----------+---------+------+------+-------+
|   Multiple           | Take 1 tablet by     |           | 0       |      |      | Activ |
| Vitamins-Minerals    | mouth Daily.         |           |         |      |      | e     |
| (ADULT MULTIVITAMIN  |                      |           |         |      |      |       |
| WITH MINERALS/IRON)  |                      |           |         |      |      |       |
| TABS                 |                      |           |         |      |      |       |
+----------------------+----------------------+-----------+---------+------+------+-------+
|   levothyroxine      | Take 100 mcg by      |           | 0       |      |      | Activ |
| (SYNTHROID,          | mouth every morning  |           |         |      |      | e     |
| LEVOTHROID) 100 mcg  | (before breakfast).  |           |         |      |      |       |
| tablet               |                      |           |         |      |      |       |
+----------------------+----------------------+-----------+---------+------+------+-------+
|   simvastatin        | Take 40 mg by mouth  |           | 0       |      |      | Activ |
| (ZOCOR) 40 mg tablet | nightly.             |           |         |      |      | e     |
+----------------------+----------------------+-----------+---------+------+------+-------+
|   FLUoxetine         | Take 20 mg by mouth  |           | 0       |      |      | Activ |
| (PROZAC) 20 mg       | Daily.               |           |         |      |      | e     |
| capsule              |                      |           |         |      |      |       |
+----------------------+----------------------+-----------+---------+------+------+-------+
|   tolterodine        | Take 4 mg by mouth   |           | 0       |      |      | Activ |
| (DETROL LA) 4 MG 24  | Daily.               |           |         |      |      | e     |
| hr capsule           |                      |           |         |      |      |       |
+----------------------+----------------------+-----------+---------+------+------+-------+
 
|   cholecalciferol    | Take 1,000 Units by  |           | 0       |      |      | Activ |
| (VITAMIN D-3) 1000   | mouth Daily.         |           |         |      |      | e     |
| UNITS TABS           |                      |           |         |      |      |       |
+----------------------+----------------------+-----------+---------+------+------+-------+
|   nitroglycerin      | Place 1 tablet under |   25      | 1       | 01/0 |      | Activ |
| (NITROSTAT) 0.4 mg   |  the tongue every 5  | tablet    |         | 3/20 |      | e     |
| SL tablet            | minutes as needed    |           |         | 17   |      |       |
|                      | for Chest pain.      |           |         |      |      |       |
+----------------------+----------------------+-----------+---------+------+------+-------+
|   amLODIPine         | Take 5 mg by mouth   |           | 0       |      |      | Activ |
| (NORVASC) 5 mg       | Daily.               |           |         |      |      | e     |
| tablet               |                      |           |         |      |      |       |
+----------------------+----------------------+-----------+---------+------+------+-------+
|   traMADol (ULTRAM)  | Take 50 mg by mouth  |           | 0       |      |      | Activ |
| 50 mg tablet         | every 6 hours as     |           |         |      |      | e     |
|                      | needed for Pain.     |           |         |      |      |       |
+----------------------+----------------------+-----------+---------+------+------+-------+
|   traZODone          | Take 50 mg by mouth  |           | 0       |      |      | Activ |
| (DESYREL) 50 mg      | nightly.             |           |         |      |      | e     |
| tablet               |                      |           |         |      |      |       |
+----------------------+----------------------+-----------+---------+------+------+-------+
|   triamcinolone      | Small amount to rash |   45 g    | 1       | /2 |      | Activ |
| (KENALOG) 0.1% cream |  twice a day         |           |         |  |      | e     |
|                      |                      |           |         | 17   |      |       |
+----------------------+----------------------+-----------+---------+------+------+-------+
|   cetirizine         | Take 1 tablet by     |   30      | 2       |  |      | Activ |
| (ZYRTEC) 10 mg       | mouth Daily. As      | tablet    |         |  |      | e     |
| tablet               | needed for rash      |           |         | 17   |      |       |
+----------------------+----------------------+-----------+---------+------+------+-------+
 
 
 
 Active Problems
 
 
+---------------------------------+------------+
| Problem                         | Noted Date |
+---------------------------------+------------+
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
+---------------------------------+------------+
 
 
 
+-------------------------------------------------------------------+
|   Overview:   Cath on 17Enlarged No AI No obvious dissection  |
| Possible small aortic ulceration CONCLUSIONS:1. Dilated Aortic    |
| Root2. No AI3. Normal Left Main4. Normal LAD5. Dominant LCX.      |
|   10% luminal irregularity in AV groove.   Gives off the PDA6.    |
| Non dominant RCA.   Mild plaque.   Arises anomalously from the    |
| left cusp                                                         |
|1. Dilated Aortic Root                                             |
|2. No AI                                                           |
|3. Normal Left Main                                                |
|4. Normal LAD                                                      |
|5. Dominant LCX.   10% luminal irregularity in AV groove.   Gives  |
|off the PDA                                                        |
|6. Non dominant RCA.   Mild plaque.   Arises anomalously from the  |
|left cusp                                                          |
+-------------------------------------------------------------------+
 
 
 
 
+------------------------------------------------+------------+
| Pure hypercholesterolemia                      | 2017 |
+------------------------------------------------+------------+
| NSTEMI (non-ST elevated myocardial infarction) | 2017 |
+------------------------------------------------+------------+
| Acquired hypothyroidism                        | 2017 |
+------------------------------------------------+------------+
| Depression                                     | 2017 |
+------------------------------------------------+------------+
 
 
 
 Family History
 
 
+------------------+----------+------+----------+
| Medical History  | Relation | Name | Comments |
+------------------+----------+------+----------+
| Coronary artery  | Brother  |      |          |
| disease          |          |      |          |
+------------------+----------+------+----------+
| Coronary artery  | Father   |      |          |
| disease          |          |      |          |
+------------------+----------+------+----------+
| Coronary artery  | Mother   |      |          |
| disease          |          |      |          |
+------------------+----------+------+----------+
 
 
 
+----------+------+--------+----------+
| Relation | Name | Status | Comments |
+----------+------+--------+----------+
| Brother  |      |        |          |
+----------+------+--------+----------+
| Father   |      |        |          |
+----------+------+--------+----------+
| Mother   |      |        |          |
+----------+------+--------+----------+
 
 
 
 Social History
 
 
+---------------+-------+-----------+--------+---------------------+
| Tobacco Use   | Types | Packs/Day | Years  | Date                |
|               |       |           | Used   |                     |
+---------------+-------+-----------+--------+---------------------+
| Former Smoker |       |           |        | Started: 1992 |
+---------------+-------+-----------+--------+---------------------+
 
 
 
+-------------+-------------+---------+----------+
| Alcohol Use | Drinks/Week | oz/Week | Comments |
+-------------+-------------+---------+----------+
| No          |             |         |          |
 
+-------------+-------------+---------+----------+
 
 
 
+------------------+---------------+
| Sex Assigned at  | Date Recorded |
| Birth            |               |
+------------------+---------------+
| Not on file      |               |
+------------------+---------------+
 
 
 
 Last Filed Vital Signs
 
 
+-------------------+-------------------+----------------------+----------+
| Vital Sign        | Reading           | Time Taken           | Comments |
+-------------------+-------------------+----------------------+----------+
| Blood Pressure    | 122/72            | 08/10/2017 11:29 AM  |          |
|                   |                   | PDT                  |          |
+-------------------+-------------------+----------------------+----------+
| Pulse             | 68                | 08/10/2017 11:29 AM  |          |
|                   |                   | PDT                  |          |
+-------------------+-------------------+----------------------+----------+
| Temperature       | 37   C (98.6   F) | 2017 11:37 AM  |          |
|                   |                   | PST                  |          |
+-------------------+-------------------+----------------------+----------+
| Respiratory Rate  | 14                | 08/10/2017 11:29 AM  |          |
|                   |                   | PDT                  |          |
+-------------------+-------------------+----------------------+----------+
| Oxygen Saturation | 94%               | 2017 11:37 AM  |          |
|                   |                   | PST                  |          |
+-------------------+-------------------+----------------------+----------+
| Inhaled Oxygen    | -                 | -                    |          |
| Concentration     |                   |                      |          |
+-------------------+-------------------+----------------------+----------+
| Weight            | 77.6 kg (171 lb)  | 08/10/2017 11:29 AM  |          |
|                   |                   | PDT                  |          |
+-------------------+-------------------+----------------------+----------+
| Height            | 160 cm (5' 3")    | 08/10/2017 11:29 AM  |          |
|                   |                   | PDT                  |          |
+-------------------+-------------------+----------------------+----------+
| Body Mass Index   | 30.29             | 08/10/2017 11:29 AM  |          |
|                   |                   | PDT                  |          |
+-------------------+-------------------+----------------------+----------+
 
 
 
 Plan of Treatment
 
 
+----------------------+-----------+-------+----------+
| Health Maintenance   | Due Date  | Last  | Comments |
|                      |           | Done  |          |
+----------------------+-----------+-------+----------+
| Vaccine:             | 10/25/196 |       |          |
| Dtap/Tdap/Td (1 -    | 6         |       |          |
| Tdap)                |           |       |          |
+----------------------+-----------+-------+----------+
 
| Vaccine: Zoster (1   | 10/25/199 |       |          |
| of 2)                | 7         |       |          |
+----------------------+-----------+-------+----------+
| Breast Cancer        | 10/25/200 |       |          |
| Screening            | 2         |       |          |
+----------------------+-----------+-------+----------+
| Vaccine:             | 10/25/201 |       |          |
| Pneumococcal 65+ (1  | 2         |       |          |
| of 1 - PPSV23)       |           |       |          |
+----------------------+-----------+-------+----------+
| Vaccine: Influenza   |  |       |          |
| (#1)                 | 0         |       |          |
+----------------------+-----------+-------+----------+
 
 
 
 Results
 Not on filefrom Last 3 Months
 
 Insurance
 
 
+----------+--------+-------------+--------+-------------+---------+--------+
| Payer    | Benefi | Subscriber  | Effect | Phone       | Address | Type   |
|          | t Plan | ID          | sukh    |             |         |        |
|          |  /     |             | Dates  |             |         |        |
|          | Group  |             |        |             |         |        |
+----------+--------+-------------+--------+-------------+---------+--------+
| MEDICARE | MEDICA | [id number]  | 10/1/2 | [phone] |         | [employer] |
|          | RE     |             | 012-Pr | 5           |         | re     |
|          | PART A |             | esent  |             |         |        |
|          |  AND B |             |        |             |         |        |
+----------+--------+-------------+--------+-------------+---------+--------+
|   | [employer] | [id number]   | / | [phone] |         | Indemn |
|          | A      |             | -P | 7           |         | ity    |
|          |        |             | resent |             |         |        |
+----------+--------+-------------+--------+-------------+---------+--------+
 
 
 
+------------------+--------+-------------+--------+-------------+---------------------+
| Guarantor Name   | [employer] | Relation to | Date   | Phone       | Billing Address     |
|                  | t Type |  Patient    | of     |             |                     |
|                  |        |             | Birth  |             |                     |
+------------------+--------+-------------+--------+-------------+---------------------+
| Sera Weeks | Person | Self        | [birth year]             |   [address]      |
|                  | al/Fam |             | 1945   | [phone] | [place] [address] |
|                  | magalys    |             |        | 2 (Home)    |                     |
+------------------+--------+-------------+--------+-------------+---------------------+
 
 
 
 Advance Directives
 
 
+-----------+-----------------+----------------+-------------+
| Type      | Date Recorded   | Patient        | Explanation |
|           |                 | Representative |             |
+-----------+-----------------+----------------+-------------+
| Power of  |                 |                |             |
 
|   |                 |                |             |
+-----------+-----------------+----------------+-------------+
| Advance   | 2017  6:11  |                |             |
| Directive | PM              |                |             |
+-----------+-----------------+----------------+-------------+
 
 
 
+-------------+------------+-------------+----------+
| Code Status | Date       | Date        | Comments |
|             | Activated  | Inactivated |          |
+-------------+------------+-------------+----------+
| Full Code   | 2017   | 1/3/2017    |          |
|             | 7:59 AM    | 4:12 PM     |          |
+-------------+------------+-------------+----------+

## 2020-07-23 NOTE — EKG
[facility]
                                    [address]
                                  [place], [place]  [address]
_________________________________________________________________________________________
                                                                 Signed   
 
 
Normal sinus rhythm
Low voltage QRS
Borderline ECG
When compared with ECG of 01-JAN-2017 04:33,
No significant change was found
Confirmed by MABEL REYES MD ([id number]) on 7/23/2020 10:17:14 PM
 
 
 
 
 
 
 
 
 
 
 
 
 
 
 
 
 
 
 
 
 
 
 
 
 
 
 
 
 
 
 
 
 
 
 
 
 
 
 
    Electronically Signed By: MABEL REYES MD  07/23/20 2217
_________________________________________________________________________________________
PATIENT NAME:     EVARISTO ORTA GISSEL                      
MEDICAL RECORD #: [id number]                     Electrocardiogram             
          ACCT #: [id number]  
DATE OF BIRTH:   [birth year]                                       
PHYSICIAN:   MABEL REYES MD                   REPORT #: [id number]
REPORT IS CONFIDENTIAL AND NOT TO BE RELEASED WITHOUT AUTHORIZATION

## 2020-07-23 NOTE — XMS
Encounter Summary
  Created on: 2020
 
 Parrish Weeksgabe Montero
 External Reference #: [id number]
 : [birth year]
 Sex: Female
 
 Demographics
 
 
+-----------------------+----------------------+
| Address               | [address]         |
|                       | [place]  [address] |
+-----------------------+----------------------+
| Home Phone            | [phone]      |
+-----------------------+----------------------+
| Preferred Language    | Unknown              |
+-----------------------+----------------------+
| Marital Status        |               |
+-----------------------+----------------------+
| Protestant Affiliation | Unknown              |
+-----------------------+----------------------+
| Race                  | Unknown              |
+-----------------------+----------------------+
| Ethnic Group          | Unknown              |
+-----------------------+----------------------+
 
 
 Author
 
 
+--------------+--------------------------------------------+
| Author       | [facility]  |
|              | and [facility]                                |
+--------------+--------------------------------------------+
| Organization | [facility]  |
|              | and [facility]                                |
+--------------+--------------------------------------------+
| Address      | Unknown                                    |
+--------------+--------------------------------------------+
| Phone        | Unavailable                                |
+--------------+--------------------------------------------+
 
 
 
 Support
 
 
+-------------------+--------------+---------+-----------------+
| Name              | Relationship | Address | Phone           |
+-------------------+--------------+---------+-----------------+
| Melissa Lau | ECON         | Unknown | [phone] |
+-------------------+--------------+---------+-----------------+
| Mike Lau   | ECON         | Unknown | [phone] |
+-------------------+--------------+---------+-----------------+
| Damaris Coyle     | ECON         | Unknown | [phone] |
+-------------------+--------------+---------+-----------------+
 
 
 
 
 Care Team Providers
 
 
+-----------------------+------+-----------------+
| Care Team Member Name | Role | Phone           |
+-----------------------+------+-----------------+
| Chandra King MD | PCP  | [phone] |
+-----------------------+------+-----------------+
 
 
 
 Reason for Visit
 Auth/Cert
 
+--------+--------+-----------+--------------+--------------+--------------+
| Status | Reason | Specialty | Diagnoses /  | Referred By  | Referred To  |
|        |        |           | Procedures   | Contact      | Contact      |
+--------+--------+-----------+--------------+--------------+--------------+
|        |        |           |   Diagnoses  |              |              |
|        |        |           |  Chest pain  |              |              |
|        |        |           |  chest pain  |              |              |
|        |        |           |  NSTEMI      |              |              |
|        |        |           | Procedures   |              |              |
|        |        |           | CV COR ANGIO |              |              |
+--------+--------+-----------+--------------+--------------+--------------+
 
 
 
 
 Encounter Details
 
 
+--------+-----------+----------------------+----------------------+----------------------+
| Date   | Type      | Department           | Care Team            | Description          |
+--------+-----------+----------------------+----------------------+----------------------+
| / | [facility]  |   [facility] |   Anastasia Brasher,  | Acquired             |
| 2017 - | Encounter |  MED CTR ICU  [address]  | MD  [address]  | hypothyroidism       |
|        |           | [place]  [place], |  [place]     | (Primary Dx); NSTEMI |
| / |           |  [place] [address]       | [address]  [phone]  |  (non-ST elevated    |
|    |           | [phone]         |  [phone] (Fax)  | myocardial           |
|        |           |                      |                      | infarction) (HCC);   |
|        |           |                      |                      | Pure                 |
|        |           |                      |                      | hypercholesterolemia |
|        |           |                      |                      | ; Anomalous right    |
|        |           |                      |                      | coronary artery      |
+--------+-----------+----------------------+----------------------+----------------------+
 
 
 
 Social History
 
 
+---------------+-------+-----------+--------+---------------------+
| Tobacco Use   | Types | Packs/Day | Years  | Date                |
|               |       |           | Used   |                     |
+---------------+-------+-----------+--------+---------------------+
| Former Smoker |       |           |        | Started: 1992 |
 
+---------------+-------+-----------+--------+---------------------+
 
 
 
+-------------+-------------+---------+----------+
| Alcohol Use | Drinks/Week | oz/Week | Comments |
+-------------+-------------+---------+----------+
| No          |             |         |          |
+-------------+-------------+---------+----------+
 
 
 
+------------------+---------------+
| Sex Assigned at  | Date Recorded |
| Birth            |               |
+------------------+---------------+
| Not on file      |               |
+------------------+---------------+
 documented as of this encounter
 
 Last Filed Vital Signs
 
 
+-------------------+----------------------+----------------------+----------+
| Vital Sign        | Reading              | Time Taken           | Comments |
+-------------------+----------------------+----------------------+----------+
| Blood Pressure    | 92/52                | 2017 11:42 AM  |          |
|                   |                      | PST                  |          |
+-------------------+----------------------+----------------------+----------+
| Pulse             | 59                   | 2017 11:37 AM  |          |
|                   |                      | PST                  |          |
+-------------------+----------------------+----------------------+----------+
| Temperature       | 37   C (98.6   F)    | 2017 11:37 AM  |          |
|                   |                      | PST                  |          |
+-------------------+----------------------+----------------------+----------+
| Respiratory Rate  | 18                   | 2017 11:37 AM  |          |
|                   |                      | PST                  |          |
+-------------------+----------------------+----------------------+----------+
| Oxygen Saturation | 94%                  | 2017 11:37 AM  |          |
|                   |                      | PST                  |          |
+-------------------+----------------------+----------------------+----------+
| Inhaled Oxygen    | -                    | -                    |          |
| Concentration     |                      |                      |          |
+-------------------+----------------------+----------------------+----------+
| Weight            | 74.6 kg (164 lb 7.4  | 2017  3:58 AM  |          |
|                   | oz)                  | PST                  |          |
+-------------------+----------------------+----------------------+----------+
| Height            | 160 cm (5' 3")       | 2017 12:25 PM  |          |
|                   |                      | PST                  |          |
+-------------------+----------------------+----------------------+----------+
| Body Mass Index   | 29.13                | 2017 12:25 PM  |          |
|                   |                      | PST                  |          |
+-------------------+----------------------+----------------------+----------+
 documented in this encounter
 
 Discharge Summaries
 Anastasia Brasher MD - 2017  8:54 AM PSTFormatting of this note might be different fr
om the original.
 
 
 
 DISCHARGE SUMMARY
 
 PATIENT NAME/:  Sera Weeks, (1945)
 MEDICAL RECORD NUMBER: [id number]
 DATE OF ADMISSION:  2017
 DATE OF DISCHARGE:  1/3/2017
 DISCHARGING PHYSICIAN: Anastasia Brasher MD
   
 
 ADMITTING DIAGNOSIS:  NSTEMI (non-ST elevated myocardial infarction) 
 PRIMARY CARE PROVIDER: Chandra King MD
 
 DISPOSITION:   Discharge to home
 
 BRIEF HOSPITAL COURSE:
    Please see the history and physical at the time of admission.  Briefly, Ms. Weeks is a [age] y.o. female who was admitted on 2017 with NSTEMI (non-ST elevated myocardial infarctio
n) .
 She was asymptomatic after admission.  
 She had recently experienced a headache behind her right eye that was unusual for her.
 We first obtained a Head CT.  That was unremarkable.
 
 She was then taken to cath:
 FINDINGS:
 Hemodynamics:
 Left ventricular end-diastolic pressure (LVEDP) was 14 mm Hg.  
 There was no gradient across the aortic valve.
 
 Left Ventriculogram: Not performed 
 Left main coronary artery: Normal 
 Left anterior descending coronary artery: Large vessel. No 
 significant disease.
 Circumflex coronary artery: Dominant. Gives off 2 marginal and 
 the PDA. No significant disease. 10% in the AV groove.
 Right coronary artery: Small non dominant vessel anomalous 
 origin from the left coronary cusp. Minimal luminal 
 irregularity.
 
 Aortic Root: 
 Enlarged 
 No AI 
 No obvious dissection 
 Possible small aortic ulceration 
 CONCLUSIONS:
 1. Dilated Aortic Root
 2. No AI
 3. Normal Left Main
 4. Normal LAD
 5. Dominant LCX. 10% luminal irregularity in AV groove. Gives 
 off the PDA
 6. Non dominant RCA. Mild plaque. Arises anomalously from the 
 left cusp
 
 A CT angio of the chest was done and there was no evidence of dissection.
 Her aorta was minimally enlarged.
 An echo was done and her LV function was normal.  Her aortic root was at the upper limit of
 normal.
 
 In the first 24 hours she had some PVC and run of VT 3 beats.  She then had no arrhythmia.
 
 
 She felt well and denied pain.
 
 She was ambulated without problem.  Her blood pressure was normal and she could not tolerat
e a lot of medication.  
 Condition at discharge:
 Ambulating normally
 Pain free
 No arrhythmia
 
 HOSPITAL PROBLEMS: 
 Principal Problem:
   NSTEMI (non-ST elevated myocardial infarction) 
 Active Problems:
   Pure hypercholesterolemia
   Acquired hypothyroidism
   Depression
   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
 
 MASTER PROBLEM LIST: 
 Patient Active Problem List 
  Diagnosis Date Noted POA 
   Pure hypercholesterolemia 2017 Unknown 
   NSTEMI (non-ST elevated myocardial infarction)  2017 Unknown 
   Acquired hypothyroidism 2017 Unknown 
   Depression 2017 Unknown 
 
 VITALS:
 Temp: 36.2 C (97.2 F)   BP: 104/58 mmHg       Pulse: 56      Resp: 16     SpO2: 95 %
 
 WEIGHT:
 Today's Weight: 74.6 kg (164 lb 7.4 oz)
   Admit  Weight: 74.3 kg (163 lb 12.8 oz)
 
 BRIEF EXAM TODAY:
  General Appearance - alert, in no distress
  Heart -  regular rate and rhythm, S1 and S2 normal, no murmur, rub, or gallop.
  Lungs - clear to auscultation bilaterally
  Musc/Skel -  moves all extremities
  Extremities - no cyanosis.  no significant edema
  Neurologic - no focal deficits
  no hematoma
 
 CONSULTATION:
 Dietitian
 
 DIAGNOSTIC STUDIES: 
 Imaging: 
 Cath:
 Informed consent was obtained from the patient, and a time-out was performed to verify the 
patient's identification and planned procedure. Please refer to the computer log entry for
m for precise details. The patient's right radial artery was sterilely prepped. Arterial
 access was achieved with micropuncture kit. Patient was then given intravenous heparin 
as well as intra-arterial nitroglycerin and Nicardipine through the sheath. A 5 French JR4
 and a 3 DRC did not fit the coronary. A pullback across the valve was used to assess if t
here was a gradient across the aortic valve A JL 3.5 did not fit the left system nor did a
 LBU 3 did not fit. A JL3 sat close to the anomalous RCA and also opacified the Left. A 
pig tail was used to perform an aortic root. . 
 The TR band occluder device was utilized to achieve successful hemostasis of the radial art
mik. There were no immediate complications. 
 
 
  Estimated blood loss was: 10 cc.
  Fluro Time: 8.9 Min 
  Total Reference Air Kerma: 443.55 mGy
  Contrast: 105 mls of Omnipaque 350
 
 FINDINGS:
 Hemodynamics:
 Left ventricular end-diastolic pressure (LVEDP) was 14 mm Hg. There was no gradient a
cross the aortic valve.
 
 Left Ventriculogram: Not performed 
 Left main coronary artery: Normal 
 Left anterior descending coronary artery: Large vessel. No significant disease.
 Circumflex coronary artery: Dominant. Gives off 2 marginal and the PDA. No significan
t disease. 10% in the AV groove.
 Right coronary artery: Small non dominant vessel anomalous origin from the left coronary 
cusp. Minimal luminal irregularity.
 
 Aortic Root: 
 Enlarged 
 No AI 
 No obvious dissection 
 Possible small aortic ulceration 
 CONCLUSIONS:
 1. Dilated Aortic Root
 2. No AI
 3. Normal Left Main
 4. Normal LAD
 5. Dominant LCX. 10% luminal irregularity in AV groove. Gives off the PDA
 6. Non dominant RCA. Mild plaque. Arises anomalously from the left cusp
 Echo 17
 Procedure date
 Date: 2017Start: 10:22 AM
 
 Technical Quality: Adequate visualizationStudy Location: ICU
 
 Patient Status: Routine
 Height: 62.99 inchesWeight: 163 poundsBSA: 1.77 m^2BMI: 28.88 kg/m^2
 HR: 54 bpm
 
 Conclusions
 Summary
 Normal Left Ventricular contractility was noted.
 Left ventricle is normal in size and function. Ejection fraction is
 estimated at 62%.
 Trace AI
 Dilated left atrium.
 aortic root at the upper limits of normal in size
 normal IVC
 
 ECG: 
 
 Head CT:
  UNENHANCED AND ENHANCED HEAD CT 2017 2:23 PM
 
 CLINICAL HISTORY: pain behind right eye  
 
 COMPARISON: None available
 
 TECHNIQUE: Axial images are performed through the head both before and after the
 
 uneventful intravenous administration of 80 cc Omnipaque 350 contrast. Coronal
 and sagittal reformations are also performed. 
 
 FINDINGS: There is mild cerebral atrophy and mild, patchy hypoattenuation
 involving the deep and periventricular cerebral white matter. Gray-white
 differentiation is maintained. The ventricles, brainstem and cerebellum are
 unremarkable. There is no mass effect, abnormal enhancement, evidence of
 intracranial hemorrhage or extra-axial fluid collection/mass. Bilateral
 prosthetic ocular lenses are present. The orbital contents are otherwise
 symmetric and unremarkable, without visible mass lesion or proptosis. There is
 mild mucous membrane thickening within bilateral ethmoid air cells. Imaged
 paranasal sinuses are otherwise well aerated, along with the middle ear cavities
 and imaged after air cells. Asymmetric degeneration of the left greater than
 right temporomandibular joints is noted.
 
 There is calcified plaque within the cavernous segments of the internal carotid
 arteries. Major intracranial vessels and dural sinuses otherwise appear patent
 and grossly unremarkable. No conclusive aneurysm is visible.
 
 IMPRESSION - 
 1. NO VISIBLE MASS, ANEURYSM OR OTHER POTENTIAL ETIOLOGY FOR RETRO-ORBITAL
 PAIN.
 
 2. MILD CEREBRAL ATROPHY AND PROBABLE CHRONIC, MICROVASCULAR ISCHEMIC CHANGE OF
 THE CEREBRAL WHITE MATTER.
 
 3. MILD ETHMOID SINUS DISEASE.
 
 4. ASYMMETRIC DEGENERATION OF THE TEMPOROMANDIBULAR JOINTS.
 
 Dictated and Signed by: Андрей Payton MD 
 Electronically signed: 2017 2:36 PM
  
  
 
 CT angio of chest and abdomen:
 HISTORY: Rule out dissection.
 
 COMPARISON: None.
 
 PROTOCOL: Axial images of the chest and abdomen were obtained before and after
 uneventful administration of 85 mL Omnipaque 350. Coronal and sagittal
 reformations were acquired.
 
 FINDINGS:
 There is moderate atherosclerosis of the aorta extending into the branches. The
 ascending thoracic aorta is mildly ectatic and measures 3.8 cm. There is no
 evidence for dissection. The celiac artery, SMA, and TWYLA are patent. The
 bilateral renal arteries are normal. There is mild to moderate atherosclerosis
 of the iliac arteries.
 
 Neck base is normal. The heart is of normal size. The pulmonary arteries are
 unremarkable. SVC is normal. No enlarged lymph nodes are seen in the
 mediastinum, kourtney, or axilla. Trachea and esophagus are normal. There is mild
 dependent atelectasis of the bilateral lungs.
 
 The liver demonstrates normal parenchyma. The gallbladder is normal. Biliary
 ducts are unremarkable. The spleen is unremarkable. The pancreas demonstrates
 normal parenchyma and a normal pancreatic duct. Adrenal glands are normal. The
 right kidney and visualized ureter are normal. The left kidney and visualized
 
 ureter are normal. Stomach is normal. Imaged small bowel and colon show no acute
 findings. There is no significant abnormality in the portal veins, mesenteric
 veins, or systemic veins. No enlarged lymph nodes are visualized within the
 omentum or retroperitoneum. There is no evidence for ascites or free air.
 
 Body wall soft tissue structures are normal. There is mild S-shaped curvature of
 the thoracolumbar spine. Mild to moderate spondylosis is present.
 
 IMPRESSION -
 No evidence for aortic dissection.
 
 A preliminary report was sent by [facility] with no significant
 discrepancy.
 
 Selected Labs: 
 
 Recent Labs
 Lab 17
 0349 17
 0447 17
 1244 
 WBC 4.4  --  4.2 
 HGB 12.5  --  13.9 
 HCT 36.4  --  41.2 
 *  --  129* 
  144 141 
 K 3.8 3.4* 3.7 
 BUN 10 8 13 
 CREA 0.72 0.62 0.54* 
 GLU 95 82 85 
 CALCIUM 8.8 8.4 9.1 
 
 Recent Labs
 Lab 17
 0447 
 HDL 44 
 LDL 81 
 TRIG 122 
 
 Recent Results (from the past 24 hour(s)) 
 ECHO Complete 
  Collection Time: 17 11:00 
 Result Value Ref Range 
  LVEF-TTE TRANSTHORACIC ECHO 62  
 D-Dimer 
  Collection Time: 17 12:33 
 Result Value Ref Range 
  D-DIMER, QUANTITATIVE 0.61 (H) <=0.50 ug/ml 
 CBC no Differential 
  Collection Time: 17  3:49 
 Result Value Ref Range 
  WBC 4.4 4.0-11.0 K/uL 
  RBC 3.85 3.70-5.20 M/uL 
  Hgb 12.5 11.5-16.0 g/dL 
  Hct 36.4 34.0-47.0 % 
  MCV 94.6 83.0-101.0 fL 
  MCH 32.5 28.0-35.0 pg 
  MCHC 34.4 32.0-36.0 g/dL 
  RDW-CV 13.1 <15.0 % 
  Platelet Count 125 (L) 140-440 K/uL 
 
  MPV 9.6 fL 
 Basic Metabolic Panel 
  Collection Time: 17  3:49 
 Result Value Ref Range 
   136-149 mmol/L 
  K 3.8 3.5-5.1 mmol/L 
    mmol/L 
  CO2 29 24-31 mmol/L 
  ANION GAP 7 3-16 mmol/L 
  GLUCOSE 95  mg/dL 
  BUN 10 7-18 mg/dL 
  Creatinine, Serum/Plasma 0.72 0.60-1.30 mg/dL 
  eGFR if not AFRICAN AMERICAN >60 >=60 mL/min/1.73m2 
  CALCIUM 8.8 8.3-10.5 mg/dL 
  BUN/CREA 13.9  
 D-Dimer 
  Collection Time: 17  3:49 
 Result Value Ref Range 
  D-DIMER, QUANTITATIVE 0.49 <=0.50 ug/ml 
 Troponin I 
  Collection Time: 17  3:50 
 Result Value Ref Range 
  Troponin I 1.28 (HH) <0.06 ng/mL 
 
 MEDS:
  
 Discharge Medications 
  
 New Medications  
    Details 
  metoprolol tartrate 25 mg tablet 
  Take 1 tablet by mouth 2 times daily. 
 aka:  LOPRESSOR 
  
  nitroglycerin 0.4 mg SL tablet 
  Place 1 tablet under the tongue every 5 minutes as needed for Chest pain. 
 aka:  NITROSTAT 
  
  
 Unchanged Medications  
    Details 
  adult multivitamin with minerals/iron Tabs 
  Take 1 tablet by mouth Daily. 
  
  aspirin 81 mg EC tablet 
  Take 81 mg by mouth Daily. 
  
  cholecalciferol 1000 UNITS Tabs 
  Take 1,000 Units by mouth Daily. 
 aka:  VITAMIN D-3 
  
  FLUoxetine 20 mg capsule 
  Take 20 mg by mouth Daily. 
 aka:  PROzac 
  
  levothyroxine 100 mcg tablet 
  Take 100 mcg by mouth every morning (before breakfast). 
 aka:  SYNTHROID, LEVOTHROID 
  
  simvastatin 40 mg tablet 
 
  Take 40 mg by mouth nightly. 
 aka:  ZOCOR 
  
  tolterodine 4 MG 24 hr capsule 
  Take 4 mg by mouth Daily. 
 aka:  DETROL LA 
  
  
 Discontinued Medications  
    
  tocopherol 400 units capsule 
 aka:  VITAMIN E 
  
  
 
 PATIENT INSTRUCTIONS:
 
 ACTIVITY:
 For 24 Hours:  Do NOT drive
 
 For 2 days:  Do NOT lift more than 1 pound with the affected arm.
           Keep the wrist dry, clean.  No dishwashing, hot tub.
                      Avoid wrist movement such as bike riding, golf.
 
 For 5 days:  Avoid vigorous exercise that uses the affected arm.
 Do not lift greater than 10 pounds until cleared 
 
 DIET:
 cardiac diet
 
 FOLLOW-UP:
 Follow up with Dr Brasher week  follow up with Dr King in a week.
 
 Time spent on discharge planning: less than 30 minutes
 
 Electronically signed by: 
 Anastasia Brasher MD on 1/3/2017 at 8:54Electronically signed by Anastasia Brasher MD at 01/0
3/2017  9:49 AM PSTdocumented in this encounter
 
 Discharge Instructions
 Instructions Anastasia Brasher MD - 2017For 24 Hours:  Do NOT drive
 
 For 2 days:  Do NOT lift more than 1 pound with the affected arm.
           Keep the wrist dry, clean.  No dishwashing, hot tub.
                      Avoid wrist movement such as bike riding, golf.
 
 For 5 days:  Avoid vigorous exercise that uses the affected arm.
 Do not lift greater than 10 pounds until cleared 
 
 Pain
  It is common to be sore for 1 to 2 days at the catheter insertion site.  You may take ac
etaminophen (Tylenol) for pain relief.  Follow the dosing instructions on the package.
 
  Take your regular aspirin as prescribed.
 
  Do NOT take other products that contain aspirin.  Do NOT take other anti-inflammatory pr
oducts such as ibuprofen (Advil, Motrin) or naproxen (Aleve, Naprosyn).  They may cause incr
eased bleeding.
 
  If you have severe pain at the catheter site, call your provider.
 
 
 Site Care
  You may remove the dressing or bandage the day after your procedure.
 
  Keep site clean and dry.  Clean the site gently with mild soap and water.  Re-apply a cl
[place] Band-Aid, if needed.
 
  It is normal to have a small bruise or lump at the insertion site.
 
  You may shower the day after your procedure, but avoid tub baths, hot tubs or swimming f
or the next 2 days.
 
  Inspect the site everyday for infection.  (see " When to Call for Help" on the next page
)
 
 Fluids
  Drink an extra 2 quarts of water, in addition to your normal fluid intake in the next 24
 hours.
  This helps the body eliminate the contrast dye given to you during your procedure.
  If you are on a fluid restriction follow orders from your physician.
 
 When to Call for Help
 
 If you have bleeding at the site, apply pressure to the site with clean fingers for 10 chioma
bryson.  
 Phone [phone]
 
  If the bleeding does not stop in 10 minutes, or there is a large amount of bleeding, heri
l 9-1-1.  Continue to apply pressure over the site until help arrives.
 
  If the bleeding stops, sit quietly for 2 hours while you keep the affected wrist straigh
t.  Call the cardiologist who did your procedure as soon as possible.
 
 Other Concerns
 Call the cardiologist who did your procedure if you have:
 
 Any of these Signs of Infection:
  Redness
  Fever higher than 101.5 degrees F or 38.6 degrees C
 
  Change in the bruise or lump.
  Numbness in your arm or wrist.
  Severe pain that is not relieved by Tylenol.
 
 Follow-up Care
  Continue your prescribed medications unless instructed otherwise.
 
  Follow-up with your primary health care provider and cardiologist after your procedure, 
as instructed.
 
  If you have questions or concerns about your cardiac catheterization procedure, call the
 number below.  
 
 
 documented in this encounter
 
 Medications at Time of Discharge
 
 
+----------------------+----------------------+-----------+---------+----------+-----------+
 
| Medication           | Sig                  | Dispensed | Refills | Start    | End Date  |
|                      |                      |           |         | Date     |           |
+----------------------+----------------------+-----------+---------+----------+-----------+
|   aspirin 81 mg EC   | Take 81 mg by mouth  |           | 0       |          |           |
| tablet               | Daily.               |           |         |          |           |
+----------------------+----------------------+-----------+---------+----------+-----------+
|   cholecalciferol    | Take 1,000 Units by  |           | 0       |          |           |
| (VITAMIN D-3) 1000   | mouth Daily.         |           |         |          |           |
| UNITS TABS           |                      |           |         |          |           |
+----------------------+----------------------+-----------+---------+----------+-----------+
|   FLUoxetine         | Take 20 mg by mouth  |           | 0       |          |           |
| (PROZAC) 20 mg       | Daily.               |           |         |          |           |
| capsule              |                      |           |         |          |           |
+----------------------+----------------------+-----------+---------+----------+-----------+
|   levothyroxine      | Take 100 mcg by      |           | 0       |          |           |
| (SYNTHROID,          | mouth every morning  |           |         |          |           |
| LEVOTHROID) 100 mcg  | (before breakfast).  |           |         |          |           |
| tablet               |                      |           |         |          |           |
+----------------------+----------------------+-----------+---------+----------+-----------+
|   Multiple           | Take 1 tablet by     |           | 0       |          |           |
| Vitamins-Minerals    | mouth Daily.         |           |         |          |           |
| (ADULT MULTIVITAMIN  |                      |           |         |          |           |
| WITH MINERALS/IRON)  |                      |           |         |          |           |
| TABS                 |                      |           |         |          |           |
+----------------------+----------------------+-----------+---------+----------+-----------+
|   nitroglycerin      | Place 1 tablet under |   25      | 1       | 20 |           |
| (NITROSTAT) 0.4 mg   |  the tongue every 5  | tablet    |         | 17       |           |
| SL tablet            | minutes as needed    |           |         |          |           |
|                      | for Chest pain.      |           |         |          |           |
+----------------------+----------------------+-----------+---------+----------+-----------+
|   simvastatin        | Take 40 mg by mouth  |           | 0       |          |           |
| (ZOCOR) 40 mg tablet | nightly.             |           |         |          |           |
+----------------------+----------------------+-----------+---------+----------+-----------+
|   tolterodine        | Take 4 mg by mouth   |           | 0       |          |           |
| (DETROL LA) 4 MG 24  | Daily.               |           |         |          |           |
| hr capsule           |                      |           |         |          |           |
+----------------------+----------------------+-----------+---------+----------+-----------+
|   metoprolol         | Take 1 tablet by     |   180     | 3       | 20 |  |
| tartrate (LOPRESSOR) | mouth 2 times daily. | tablet    |         | 17       | 7         |
|  25 mg tablet        |                      |           |         |          |           |
+----------------------+----------------------+-----------+---------+----------+-----------+
 documented as of this encounter
 
 Progress Notes
 Karla Hinojosa PharmD - 2017 10:31 AM Lorna Kylah Weeks was admitted for NSTEMI a
nd discharged home today (1/3/2017)
 
 Taught AVS education to patient. Education was focused on new medications and/or changed me
dications. I explained indication, how to take, possible side effects, when to contact physi
yehuda, and monitor parameters.
 
 The patient was encouraged to make a follow-up appointment with PCP and cardiologist. 
 
 The patient verbalized understanding of the above and all questions were answered. Pharmaci
st will follow-up with patient in one to two business days. Patient was provided with a izzy
nciled discharge medication list as part of their AVS instructions. Encouraged patient to sh
are medication list with healthcare providers and keep list current.
 
 Karla Hinojosa PHARMD 1/3/2017 10:31
 Electronically signed by Karla Hinojosa PharmD at 2017 10:31 AM Sharifa Martell P harmD - 2017  5:25 PM PSTFormatting of this note might be different from the original.
 PHARMACY SERVICES: ADMISSION MEDICATION REVIEW
 
 Sera Weeks is a 71 y.o. female admitted on 17.
 
 Patient is a reliable historian. 
 
 Location of Patient when reviewed:
 [] ED   [x] Medical Floor
 
 Patient  s prior to admit medication and over the counter (OTC) medications/herbal supplem
ents list obtained from:
 
 [x] Verbal interview (patient is able to recall drug name, strength, frequency, etc.) 
 [] Patient/family member provided a complete current medication list or bottles
 [] MAR from [facility] facility: 
 [] Doctor's office:
 [] Pharmacy    list names: 
 [x] [employer] insurance reported information
 [] Care Everywhere 
 [] Other sources: 
 
 Vaccines up to date?  
  Yes No Unsure 
 Influenza [x] [] [] 
 Pneumococcal [x] [] [] 
 Tdap [x] [] [] 
 Shingles [x] [] [] 
 
 Noted medications discrepancies or medication-related issues: 
 
  Medication added: 
 Medication: Prior to Admission Sig: 
 Cholecalciferol 1000 u 1 tab daily 
 Tocopherol 400 u 1 cap daily 
 
 Medication review performed and electronically signed by Joe Gong Pharm Tech 
017 17:15
 Sharifa Gibbons 2017 17:24
 Electronically signed by Sharifa Posada PharmD at 2017  5:25 PM Anastasia Jameson MD - 2017  9:40 AM PSTFormatting of this note might be different from the original.
   
 
 PATIENT NAME:  Sera Weeks  
 : 1945:         AGE: 71 y.o.
  PRIMARY CARE:
 Chandra King MD 
 
 INPATIENT FOLLOW UP VISIT
 
 Date of Service: 17
 
 HISTORY OF PRESENT ILLNESS:
 Sera Weeks is a 71 y.o. female with a history of NSTEMI yesterday.  She is being seen tod
 for follow up.
 
 She feels well.  No chest pain or shortness of breath.  She had some asymptomatic NSVT over
 night.
 
 MEDICAL, SURGICAL, AND PERSONAL HISTORY
 
 Past Medical, Surgical, Family, and Social History are reviewed in EPIC.
 
 CURRENT PROBLEMS
 Patient Active Problem List 
 Diagnosis 
   Pure hypercholesterolemia 
   NSTEMI (non-ST elevated myocardial infarction)  
   Acquired hypothyroidism 
   Depression 
 
 CURRENT MEDICATIONS
 Current Facility-Administered Medications 
 Medication Dose Route Frequency Provider Last Rate Last Dose 
   acetaminophen (TYLENOL) tablet 650 mg  650 mg Oral Q6H PRN Anastasia Brasher MD   650 mg
 at 17 2357 
   aluminum & magnesium hydroxide-simethicone (MAALOX PLUS REGULAR STRENGTH) 200-200-20 mg
/5 mL suspension 30 mL  30 mL Oral Q4H PRN Anastasia Brasher MD     
   aspirin EC tablet 81 mg  81 mg Oral Daily Anastasia Brasher MD   81 mg at 17 0842 
   atorvaSTATin (LIPITOR) tablet 40 mg  40 mg Oral Nightly Anastasia Brasher MD   40 mg at 
17 205 
   FLUoxetine (PROzac) capsule 20 mg  20 mg Oral Daily Anastasia Brasher MD   20 mg at  1727 
   levothyroxine (SYNTHROID, LEVOTHROID) tablet 100 mcg  100 mcg Oral QAM AC Anastasia Anguiano ms, MD   100 mcg at 17 0632 
   lidocaine 1%-EPINEPHrine 1:100,000 injection 5 mL  5 mL Infiltration Once PRN Anastasia clay MD     
   magnesium sulfate 2 g/50 mL IVPB 2 g  2 g Intravenous Once Anastasia Brasher MD 25 mL/hr
 at 17 0842 2 g at 17 0842 
   metoprolol tartrate (LOPRESSOR) tablet 50 mg  50 mg Oral BID Anastasia Brasher MD   50 m
g at 17 0842 
   nitroglycerin (NITROSTAT) SL tablet 0.4 mg  0.4 mg Sublingual Q5 Min PRN Anastasia santos MD     
   ondansetron (ZOFRAN) injection 4-8 mg  4-8 mg Intravenous Q6H PRN Anastasia Brasher MD  
   
  
 
 ALLERGIES
 Allergies 
 Allergen Reactions 
   Codeine Itching 
 
 ROS
 Otherwise negative
 OBJECTIVE:  
 
 PHYSICAL EXAM
 BP 87/54 mmHg | Pulse 60 | Temp(Src) 36.4 C (97.5 F) (Oral) | Resp 18 | Ht 1.6 m (5' 3"
) | Wt 75.3 kg (166 lb 0.1 oz) | BMI 29.41 kg/m2 | SpO2 93%
   General appearance: no acute distress.
   Eyes: no icterus. Lids normal
   Mouth: no cyanosis.
   Neck: No lymphadenopathy in the neck or supraclavicular area. Good carotid upstroke.   No
 bruits. No JVD
   Lungs: CTA    
   Heart: normal sounds  no murmur
   Abdomen: soft.
   Ext: No CCE in upper or lower extremities
   Neuro: Awake and oriented x3
 
 ECG:  
 
 
 LAB RESULTS: 
 LIPID
 Will add on 
 
 CHEMISTRY
 Lab Results 
 Component Value Date 
  GLU 82 2017 
   2017 
  K 3.4* 2017 
  * 2017 
  CO2 25 2017 
  CALCIUM 8.4 2017 
  ALKPHOS 41 2017 
  AST 30 2017 
  ALT 16 2017 
  BILITOT 0.6 2017 
  CREA 0.62 2017 
  BUN 8 2017 
 
 HEMATOLOGY
 Lab Results 
 Component Value Date 
  WBC 4.2 2017 
  HGB 13.9 2017 
  HCT 41.2 2017 
  * 2017 
 
 ASSESSMENT:  
 NSTEMI
 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
 Small ascending aortic aneurysm without dissection
 Non sustained VT in the first 24 hours of MI
 
 PLAN:  
 Echo for LV function and to assess infarct location
 Replace K
 Add magnesium
 Beta blocker
 
 Electronically signed by: Anastasia Brasher MD [facility] 2017 
 
 Portions of this chart may have been created with Dragon voice recognition software. Occasi
onal wrong-word or   sound-alike  substitutions may have occurred due to the inherent martell
itations of voice recognition software. Please read the chart carefully and recognize, using
 context, where these substitutions have occurred.
 Electronically signed by Anastasia Brasher MD at 2017  9:49 AM [facility]
 - 2017  2:47 PM PSTHead CT is negative.
 The risk and benefits of appropriate heart  catheterization with possible balloons, stents,
 or other appropriate techniques as indicated were discussed.  It was explained that the pos
sible complications include but are not limited to death, stroke, heart arrest,heart attack,
 emergency surgery, blood vessel injury possibly requiring surgical repair, site infection, 
acute kidney failure, and/or reactions to iodine contrast agent or sedatives. Long term risk
s include re-blockage (restenosis) and stent thrombosis (clotting).    The patient's questio
ns were answered, and the patient wishes to proceed. Patient is appropriate for conscious se
dation.
 Types of stents were discussed and DAPHNEY would be most appropriate for this patient.Jong bravo signed by Anastasia Brasher MD at 2017  2:48 PM PSTdocumented in this encounter
 
 
 H&P Notes
 Anastasia Brasher MD - 2017 12:59 PM PSTFormatting of this note might be different fr
om the original.
                           Admission H&P
 
 Referring Provider: ER [facility] 
 Primary Care: Dr King
 
 Reason for Admission: Unstable Angina 
 
 2017
 Sera Weeks is a 71 y.o. female
 
 History of Present Illness:
 Very pleasant 70 y/o female who awoke with jaw pain at 3:30 in the morning.  She has never 
had anything like this before.  It started on the right and then spread to both sides of her
 jaw and her tongue.
 She was seen in the ER at [facility].  She was given ASA and NTG  First set of enzymes were
 negative and second set Trop was 0.11.  She was going to get Heparin.  This was not started
.
 She is asymptomatic.  It was present for about 10 hours.
 She was a little dizzy this am.  She did not have dyspnea or diaphoresis.
 
 Past Medical History:
 Dyslipidemia
 Hypothyroidism
 Depression
 Urinary incontinence
 
 Current Medications:
 Home Medications 
 Medication Sig 
   aspirin 81 mg EC tablet Take 81 mg by mouth Daily. 
   FLUoxetine (PROZAC) 20 mg capsule Take 20 mg by mouth Daily. 
   levothyroxine (SYNTHROID, LEVOTHROID) 100 mcg tablet Take 100 mcg by mouth every mornin
g (before breakfast). 
   Multiple Vitamins-Minerals (ADULT MULTIVITAMIN WITH MINERALS/IRON) TABS Take 1 tablet b
y mouth Daily. 
   simvastatin (ZOCOR) 40 mg tablet Take 40 mg by mouth nightly. 
   tolterodine (DETROL LA) 4 MG 24 hr capsule Take 4 mg by mouth Daily. 
 
 Allergies:
 
 Allergies 
 Allergen Reactions 
   Codeine Itching 
 
 Family History:
 
 No family history on file.
 
 Social History:
 
 Social History 
 
 Social History 
   Marital Status: N/A 
   Spouse Name: N/A 
   Number of Children: N/A 
   Years of Education: N/A 
 
 
 Occupational History 
   Not on file. 
 
 Social History Main Topics 
   Smoking status: Former Smoker 
   Start date: 1992 
   Smokeless tobacco: Not on file 
   Alcohol Use: No 
   Drug Use: Not on file 
   Sexual Activity: Not on file 
 
 Other Topics Concern 
   Not on file 
 
 Social History Narrative 
   No narrative on file 
 
 Review of Systems:
 For the last weeks she has had pain behind her right eye and in her right eye brow.
 Last episode was today
 Some tingling in right hand
 Hands are cool - no changes of red, white or blue
 Review of Systems 
 Constitutional: Negative for fever, chills, weight loss, malaise/fatigue and diaphoresis. 
 HENT: Negative for congestion, ear discharge, ear pain, hearing loss, nosebleeds and tinnit
us.  
 Eyes: Positive for pain. Negative for blurred vision, double vision, photophobia, discharge
 and redness. 
 Respiratory: Negative for cough, hemoptysis, sputum production, shortness of breath and whe
ezing.  
 Cardiovascular: Positive for palpitations and leg swelling. Negative for orthopnea, claudic
ation and PND. 
 Gastrointestinal: Negative for heartburn, nausea, vomiting, blood in stool and melena. 
 Genitourinary: Negative for dysuria, urgency, frequency, hematuria and flank pain. 
 Musculoskeletal: Negative for myalgias, back pain and neck pain. 
 Skin: Negative for itching and rash. 
 Neurological: Positive for dizziness and headaches. Negative for tingling, tremors, sensory
 change, speech change, focal weakness, seizures, loss of consciousness and weakness. 
 Endo/Heme/Allergies: Negative for environmental allergies and polydipsia. Does not bruise/b
leed easily. 
 Psychiatric/Behavioral: Positive for depression. Negative for suicidal ideas, hallucination
s, memory loss and substance abuse. The patient is not nervous/anxious and does not have ins
omnia.  
 
 Reviewed 10 systems, all were negative except as listed in HPI
 
 Physical Exam:
 /64 mmHg | Pulse 67 | Temp(Src) 36.2 C (97.2 F) | Resp 15 | Ht 1.6 m (5' 3") | Wt
 74.3 kg (163 lb 12.8 oz) | BMI 29.02 kg/m2 | SpO2 96%
 Physical Exam 
 Constitutional: She is oriented to person, place, and time. She appears well-developed and 
well-nourished. No distress. 
 HENT: 
 Head: Normocephalic and atraumatic. 
 Eyes: Conjunctivae and EOM are normal. Pupils are equal, round, and reactive to light. No s
cleral icterus. 
 Neck: Normal range of motion. No JVD present. No tracheal deviation present. No thyromegaly
 present. 
 Cardiovascular: Normal rate, regular rhythm, normal heart sounds and intact distal pulses. 
 
 Exam reveals no gallop and no friction rub.  
 No murmur heard.
 Pulmonary/Chest: Effort normal and breath sounds normal. No stridor. No respiratory distres
s. She has no wheezes. She has no rales. She exhibits no tenderness. 
 Abdominal: Soft. Bowel sounds are normal. She exhibits no distension and no mass. There is 
no tenderness. There is no rebound and no guarding. 
 Musculoskeletal: She exhibits no edema or tenderness. 
 Lymphadenopathy: 
   She has no cervical adenopathy. 
 Neurological: She is alert and oriented to person, place, and time. No cranial nerve defici
t. 
 Skin: Skin is warm and dry. She is not diaphoretic. 
 Psychiatric: She has a normal mood and affect. Her behavior is normal. 
 
 Test Results:
 ECG reviewed by me from ER and here Incomplete RBBB otherwise normal
 
 Recent Results (from the past 24 hour(s)) 
 Troponin I 
 Result Value Ref Range 
  Troponin I 2.01 (HH) <0.06 ng/mL 
 CBC no Differential 
 Result Value Ref Range 
  WBC 4.2 4.0-11.0 K/uL 
  RBC 4.33 3.70-5.20 M/uL 
  Hgb 13.9 11.5-16.0 g/dL 
  Hct 41.2 34.0-47.0 % 
  MCV 95.0 83.0-101.0 fL 
  MCH 32.2 28.0-35.0 pg 
  MCHC 33.9 32.0-36.0 g/dL 
  RDW-CV 12.8 <15.0 % 
  Platelet Count 129 (L) 140-440 K/uL 
  MPV 10.1 fL 
 Basic Metabolic Panel 
 Result Value Ref Range 
   136-149 mmol/L 
  K 3.7 3.5-5.1 mmol/L 
    mmol/L 
  CO2 30 24-31 mmol/L 
  ANION GAP 6 3-16 mmol/L 
  GLUCOSE 85  mg/dL 
  BUN 13 7-18 mg/dL 
  Creatinine, Serum/Plasma 0.54 (L) 0.60-1.30 mg/dL 
  eGFR if not AFRICAN AMERICAN >60 >=60 mL/min/1.73m2 
  CALCIUM 9.1 8.3-10.5 mg/dL 
  BUN/CREA 24.1  
 PTT 
 Result Value Ref Range 
  PTT 32 22-36 seconds 
 ECG 12 lead 
 Result Value Ref Range 
  VENTRICULAR RATE EKG 57 BPM 
  ATRIAL RATE 57 BPM 
  P-R INTERVAL 146 ms 
  QRS DURATION 78 ms 
  Q-T INTERVAL 450 ms 
  Q-T INTERVAL (CORRECTED) 438 ms 
  P WAVE AXIS 34 degrees 
  QRS AXIS -2 degrees 
  T AXIS 56 degrees 
 
  INTERPRETATION TEXT   
   Sinus bradycardia
 Low voltage QRS
 Nonspecific ST abnormality
 Borderline ECG
 No previous ECGs available
 Confirmed by ANASTASIA BRASHER MD ([id number]) on 2017 1:32:01 PM
  
  
 Impression/Plan:
 
 1. NSTEMI
 2. Pain behind right eye - etiology unknown
 3. Dyslipidemia
 4. Strong + family history of CAD
 Plan:
 CT of head with contrast r/o intracranial pathology
 Then proceed with cath
 As heparin was never started at [facility] I will hold off pending the head CT
 
 Electronically signed by Anastasia Brsaher MD at 2017  1:40 PM PSTdocumented in this e
ncounter
 
 Procedure Notes
 Anastasia Brasher MD - 2017  5:10 PM PSTAssociated Order(s): CV CARDIAC PROCEDUREForm
atting of this note might be different from the original.
 Sera Weeks is a 71 y.o. female patient.
 1. Acquired hypothyroidism  
 2. NSTEMI (non-ST elevated myocardial infarction) (HCC)  
 3. Pure hypercholesterolemia  
 
 Past Medical History 
 Diagnosis Date 
   Hyperlipidemia  
   Hypothyroidism  
   Incontinence  
   Depression  
 
 Blood pressure 124/78, pulse 63, temperature 36.2 C (97.2 F), resp. rate 19, height 1.6
 m (5' 3"), weight 74.3 kg (163 lb 12.8 oz), SpO2 90 %.
 
 CV Cardiac Procedure
 Date/Time: 2017 17:10
 Performed by: ANASTASIA BRASHER
 Authorized by: ANASTASIA BRASHER
 Consent: Verbal consent obtained. Written consent obtained.
 Risks and benefits: risks, benefits and alternatives were discussed
 Consent given by: patient
 Patient understanding: patient states understanding of the procedure being performed
 Patient consent: the patient's understanding of the procedure matches consent given
 Procedure consent: procedure consent matches procedure scheduled
 Relevant documents: relevant documents present and verified
 Test results: test results available and properly labeled
 Site marked: the operative site was marked
 Imaging studies: imaging studies available
 Required items: required blood products, implants, devices, and special equipment available
 Patient identity confirmed: verbally with patient and arm band
 Time out: Immediately prior to procedure a "time out" was called to verify the correct maki
ent, procedure, equipment, support staff and site/side marked as required.
 Preparation: Patient was prepped and draped in the usual sterile fashion.
 
 Local anesthesia used: yes
 Local anesthetic: lidocaine 1% without epinephrine
 Patient sedated: yes
 Sedation type: moderate (conscious) sedation
 Sedatives: fentanyl and midazolam
 Vitals: Vital signs were monitored during sedation.
 Patient tolerance: Patient tolerated the procedure well with no immediate complications
 
 [facility]
 
 LEFT CARDIAC CATHETERIZATION REPORT
 
 PATIENT NAME:  Sera Weeks
 YOB: 1945
 MEDICAL RECORD NUMBER:  [id number]
 DATE OF PROCEDURE:   2017
                                                                             
 PRIMARY CARE PROVIDER:  Chandra King MD
 :   Anastasia Brasher MD, [facility], [facility]
 
 PRE-PROCEDURE DIAGNOSIS:   NSTEMI
 POST-PROCEDURE DIAGNOSIS:  same
 
 PROCEDURES PERFORMED:      
 1.  Left Heart Catheterization for pressures
 2.  Coronary Angiography
 3.  Aortic Root 
 
 DESCRIPTION OF PROCEDURE:
 
 Informed consent was obtained from the patient, and a time-out was performed to verify the 
patient's identification and planned procedure.  Please refer to the computer log entry form
 for precise details.  The patient's right radial artery was sterilely prepped.  Arterial ac
cess was achieved with  micropuncture kit.  Patient was then given intravenous heparin as we
ll as intra-arterial nitroglycerin and Nicardipine through the sheath.  A 5 French JR4 and a
 3 DRC did not fit the coronary.  A pullback across the valve was used to assess if there wa
s a gradient across the aortic valve A  JL 3.5 did not fit the left system nor did a LBU 3 d
id not fit.  A JL3 sat close to the anomalous RCA and also opacified the Left.  A pig tail w
as used to perform an aortic root. .  
 The TR band occluder device was utilized to achieve successful hemostasis of the radial art
mik.  There were no immediate complications.  
 
   Estimated blood loss was: 10 cc.
   Fluro Time: 8.9 Min 
   Total Reference Air Kerma: 443.55 mGy
   Contrast: 105 mls of Omnipaque 350
 
 FINDINGS:
 Hemodynamics:
 Left ventricular end-diastolic pressure (LVEDP) was 14 mm Hg.    There was no gradient acro
ss the aortic valve.
 
 Left Ventriculogram:  Not performed 
 Left main coronary artery:  Normal 
 Left anterior descending coronary artery:  Large vessel.  No significant disease.
 Circumflex coronary artery:  Dominant.  Gives off 2 marginal and the PDA.  No significant d
isease.  10% in the AV groove.
 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
usp.  Minimal luminal irregularity.
 
 
 Aortic Root:  
 Enlarged 
 No AI 
 No obvious dissection 
 Possible small aortic ulceration 
 CONCLUSIONS:
 1. Dilated Aortic Root
 2. No AI
 3. Normal Left Main
 4. Normal LAD
 5. Dominant LCX.  10% luminal irregularity in AV groove.  Gives off the PDA
 6. Non dominant RCA.  Mild plaque.  Arises anomalously from the left cusp
 
 CLINICAL IMPRESSION AND RECOMMENDATIONS 
 Will need CT of aorta to rule out dissection
 Echo for LV function
 
 Anastasia Brasher MD, FACC, [facility]
 DATE/TIME:  2017 17:11
 2017 17:11
 
 Portions of this chart were created with Dragon voice recognition software. Occasional wron
g-word or   sound-alike  substitutions may have occurred due to the inherent limitations 
of voice recognition software. Please read the chart carefully and recognize, using context,
 where these substitutions have occurred
 
 Anastasia Brasher
 2017
 Electronically signed by Anastasia Brasher MD at 2017  7:10 PM PSTdocumented in this e
ncounter
 
 Miscellaneous Notes
 Plan of Care - Dayan Daniel RN - 2017  1:59 PM PSTProblem: Patient Care Ove
rview (Adult)
 Goal: Care Team Goals & Evaluation
 PROBLEM-RELATED GOALS:
 **
 
 STRATEGY TO ACHIEVE GOALS:
 **
 
 RESTRAINT-RELATED GOALS:
 
 STRATEGIES TO ACHIEVE RESTRAINT GOALS: 
 Outcome: Improving
 Goal Evaluation:
 Pt denied any chest pain. Ambulated in the hallway independently x 2. No complaint while wa
lking. 
 
 All questions answered. No further questions or concerns raised. Pt ambulated out of the ro
om in a stable condition. Electronically signed by Dayan Daniel RN at 2017  2
:11 PM PSTPlan of Care - Sivan Shin RN - 2017  6:12 AM PSTProblem: Patient 
Care Overview (Adult)
 Goal: Personalization
 Needs & Preferences 
 Outcome: Improving
 Slept well.  Denies chest pain or sob.  VSS.  Independent in room.
 
   Electronically signed by Sivan Shin RN at 2017  6:12 AM PSTPlan of [facility] -
 
 Carmelita Cox RN - 2017  4:10 PM PSTProblem: Discharge Planning
 Goal: Patient will be discharged in a safe manner
 Discharge planning:
 This CM met with patient, Sera Weeks. 
 She lives alone in her home in [place]. 
 She is very independent. She does have a cane, but rately needs to use it. She does not use
 any hoe oxygen, nor CPAP. 
 She does still drives, does her own cooking, Cleaning and is able to care for herself. 
 Her son lives across the street from her and would help with any of her needs if she had an
y. 
 Her PCP is Dr King and she use [facility] in [place]. 
 Her son will be her transportation home when she is medically stable for discharge. 
 Electronically signed by: Carmelita Cox RN 2017 16:10
   
 
   Electronically signed by Carmelita Cox RN at 2017  4:10 PM PSTPlan of [facility] Chaplain - 2017  8:23 AM PSTProblem: Patient Care Overview (Adult)
 Goal: Care Team Goals & Evaluation
 PROBLEM-RELATED GOALS:
 **
 
 STRATEGY TO ACHIEVE GOALS:
 **
 
 RESTRAINT-RELATED GOALS:
 
 STRATEGIES TO ACHIEVE RESTRAINT GOALS: 
 Spiritual Care
 
 Sera Weeks is a 71 y.o. female who is admitted for Chest pain [R07.9] 
 
 This is my first visit with the patient.   Sera Weeks  was asleep at the time of the visi
t. 
 Her granddaughter was present supporting at her bedside and had been there all night.  
 
 Spiritual Evaluation:
 Patient was asleep so no assessment made.   
 Granddaughter is from [place], showed very positive compassion toward her grandmother.
 
 Spiritual Interventions:
 Engaged in social conversation; active listening and pastoral support was provided.  
 Granddaughter stated no needs, offered Chaplaincy support if any needs arose. 
 
 Spiritual Outcomes:
 Expressed appreciation for the visit. 
 
 Spiritual Goals/Followup:
 Will see the patient as requested. If there are any other spiritual care issues that arise,
 please contact .
 
   
 
   Electronically signed by Chaplain Sharan at 2017  8:23 AM PSTPlan of Sivan David RN - 2017  7:33 AM PSTProblem: Patient Care Overview (Adul
t)
 Goal: Personalization
 Needs & Preferences 
 Outcome: Improving
 Patient slept well overnight.  No c/o chest pain or SOB reported.  R. Radial dressing c/d/i
.  Good pulses.
 
 
   Electronically signed by Sivan Shin RN at 2017  7:33 AM PSTdocumented in 
this encounter
 
 Plan of Treatment
 Not on filedocumented as of this encounter
 
 Procedures
 
 
+---------------------+--------+-------------+----------------------+----------------------+
| Procedure Name      | Priori | Date/Time   | Associated Diagnosis | Comments             |
|                     | ty     |             |                      |                      |
+---------------------+--------+-------------+----------------------+----------------------+
| TROPONIN I          | Routin | 2017  |                      |   Results for this   |
|                     | e      |  3:50 AM    |                      | procedure are in the |
|                     |        | PST         |                      |  results section.    |
+---------------------+--------+-------------+----------------------+----------------------+
| D-DIMER             | Routin | 2017  |                      |   Results for this   |
|                     | e      |  3:49 AM    |                      | procedure are in the |
|                     |        | PST         |                      |  results section.    |
+---------------------+--------+-------------+----------------------+----------------------+
| CBC NO DIFFERENTIAL | Routin | 2017  |                      |   Results for this   |
|                     | e      |  3:49 AM    |                      | procedure are in the |
|                     |        | PST         |                      |  results section.    |
+---------------------+--------+-------------+----------------------+----------------------+
| BASIC METABOLIC     | Routin | 2017  |                      |   Results for this   |
| PANEL               | e      |  3:49 AM    |                      | procedure are in the |
|                     |        | PST         |                      |  results section.    |
+---------------------+--------+-------------+----------------------+----------------------+
| D-DIMER             | Routin | 2017  |                      |   Results for this   |
|                     | e      | 12:33 PM    |                      | procedure are in the |
|                     |        | PST         |                      |  results section.    |
+---------------------+--------+-------------+----------------------+----------------------+
| ECHO COMPLETE       | Routin | 2017  |                      |   Results for this   |
|                     | e      | 11:00 AM    |                      | procedure are in the |
|                     |        | PST         |                      |  results section.    |
+---------------------+--------+-------------+----------------------+----------------------+
| ECG 12 LEAD         | Routin | 2017  |                      |   Results for this   |
|                     | e      |  6:03 AM    |                      | procedure are in the |
|                     |        | PST         |                      |  results section.    |
+---------------------+--------+-------------+----------------------+----------------------+
| LIPID PANEL         | Add-On | 2017  |                      |   Results for this   |
|                     |        |  4:47 AM    |                      | procedure are in the |
|                     |        | PST         |                      |  results section.    |
+---------------------+--------+-------------+----------------------+----------------------+
| EXTRA LAVENDER TOP  | Routin | 2017  |                      |   Results for this   |
| TUBE                | e      |  4:47 AM    |                      | procedure are in the |
|                     |        | PST         |                      |  results section.    |
+---------------------+--------+-------------+----------------------+----------------------+
| COMPREHENSIVE       | Routin | 2017  |                      |   Results for this   |
| METABOLIC PANEL     | e      |  4:47 AM    |                      | procedure are in the |
|                     |        | PST         |                      |  results section.    |
+---------------------+--------+-------------+----------------------+----------------------+
| TROPONIN I          | Routin | 2017  |                      |   Results for this   |
|                     | e      |  8:33 PM    |                      | procedure are in the |
|                     |        | PST         |                      |  results section.    |
+---------------------+--------+-------------+----------------------+----------------------+
| CT ANGIOGRAM CHEST  | Routin | 2017  |                      |   Results for this   |
| ABDOMEN W CONTRAST  | e      |  5:05 PM    |                      | procedure are in the |
 
|                     |        | PST         |                      |  results section.    |
+---------------------+--------+-------------+----------------------+----------------------+
| CV COR ANGIO        | Routin | 2017  |                      |   Results for this   |
|                     | e      |  4:26 PM    |                      | procedure are in the |
|                     |        | PST         |                      |  results section.    |
+---------------------+--------+-------------+----------------------+----------------------+
| CT HEAD W WO        | STAT   | 2017  |                      |   Results for this   |
| CONTRAST            |        |  2:23 PM    |                      | procedure are in the |
|                     |        | PST         |                      |  results section.    |
+---------------------+--------+-------------+----------------------+----------------------+
| ECG 12 LEAD         | STAT   | 2017  |                      |   Results for this   |
|                     |        | 12:50 PM    |                      | procedure are in the |
|                     |        | PST         |                      |  results section.    |
+---------------------+--------+-------------+----------------------+----------------------+
| TROPONIN I          | Routin | 2017  |                      |   Results for this   |
|                     | e      | 12:44 PM    |                      | procedure are in the |
|                     |        | PST         |                      |  results section.    |
+---------------------+--------+-------------+----------------------+----------------------+
| PTT                 | Routin | 2017  |                      |   Results for this   |
|                     | e      | 12:44 PM    |                      | procedure are in the |
|                     |        | PST         |                      |  results section.    |
+---------------------+--------+-------------+----------------------+----------------------+
| CBC NO DIFFERENTIAL | Routin | 2017  |                      |   Results for this   |
|                     | e      | 12:44 PM    |                      | procedure are in the |
|                     |        | PST         |                      |  results section.    |
+---------------------+--------+-------------+----------------------+----------------------+
| BASIC METABOLIC     | STAT   | 2017  |                      |   Results for this   |
| PANEL               |        | 12:44 PM    |                      | procedure are in the |
|                     |        | PST         |                      |  results section.    |
+---------------------+--------+-------------+----------------------+----------------------+
| XR CHEST PA OR AP   | Routin | 2017  |                      |   Results for this   |
|                     | e      |  4:55 AM    |                      | procedure are in the |
|                     |        | PST         |                      |  results section.    |
+---------------------+--------+-------------+----------------------+----------------------+
| ECG - EXTERNAL SCAN |        | 2017  |                      |   Results for this   |
|                     |        | 12:00 AM    |                      | procedure are in the |
|                     |        | PST         |                      |  results section.    |
+---------------------+--------+-------------+----------------------+----------------------+
 documented in this encounter
 
 Results
 Troponin I (2017  3:50 AM PST)
 
+------------+--------------------------+-------------+-------------+--------------+
| Component  | Value                    | Ref Range   | Performed   | Pathologist  |
|            |                          |             | At          | Signature    |
+------------+--------------------------+-------------+-------------+--------------+
| Troponin I | 1.28 ()Comment:        | <0.06 ng/mL | [facility]  |              |
|            | Consistent with previous |             | [facility]    |              |
|            |  results. Reference      |             | MEDICAL     |              |
|            | Ranges:0.00-0.06 =       |             | CENTER -    |              |
|            | NORMAL>0.06       =      |             | LABORATORY  |              |
|            | SUSPICIOUS FOR           |             |             |              |
|            | MYOCARDIAL DAMAGE NOTE:  |             |             |              |
|            | Values greater than 0.50 |             |             |              |
|            |  ng/mL have been shown   |             |             |              |
|            | to be strongly           |             |             |              |
|            | associated with acute    |             |             |              |
|            | myocardial infarction.   |             |             |              |
|            | The American College of  |             |             |              |
 
|            | Cardiology (ACC)         |             |             |              |
|            | recommends a decision    |             |             |              |
|            | limit of 0.06 ng/mL for  |             |             |              |
|            | this assay.   Results    |             |             |              |
|            | greater than 0.06 can    |             |             |              |
|            | reflect a pre-infarct    |             |             |              |
|            | acute coronary syndrome, |             |             |              |
|            |  but can also reflect    |             |             |              |
|            | myocardial necrosis or   |             |             |              |
|            | injury that is not due   |             |             |              |
|            | to coronary artery       |             |             |              |
|            | disease.   Some of these |             |             |              |
|            |  causes are sepsis,      |             |             |              |
|            | hypocolemia, atrial      |             |             |              |
|            | fibrillation, heart      |             |             |              |
|            | failure, pulmonary       |             |             |              |
|            | embolism, myocarditis,   |             |             |              |
|            | myocardial contusion,    |             |             |              |
|            | and renal failure.   The |             |             |              |
|            |  diagnosis of myocardial |             |             |              |
|            |  infarction should be    |             |             |              |
|            | based on a combination   |             |             |              |
|            | of the patient's         |             |             |              |
|            | clinical presentation    |             |             |              |
|            | and the clinical         |             |             |              |
|            | laboratory test results  |             |             |              |
|            | (especially serial       |             |             |              |
|            | troponin levels).        |             |             |              |
+------------+--------------------------+-------------+-------------+--------------+
 
 
 
+----------+
| Specimen |
+----------+
| Blood    |
+----------+
 
 
 
+----------------------+--------------------+--------------------+----------------+
| Performing           | Address            | City/State/Zipcode | Phone Number   |
| Organization         |                    |                    |                |
+----------------------+--------------------+--------------------+----------------+
|   [facility]     |   [address] |   [place]  |   [phone] |
| [facility]  |                    | [address]              |                |
| - LABORATORY         |                    |                    |                |
+----------------------+--------------------+--------------------+----------------+
 CBC no Differential (2017  3:49 AM PST)
 
+-------------+---------+-----------------+-------------+--------------+
| Component   | Value   | Ref Range       | Performed   | Pathologist  |
|             |         |                 | At          | Signature    |
+-------------+---------+-----------------+-------------+--------------+
| White Blood | 4.4     | 4.0 - 11.0 K/uL | [facility]  |              |
|  Cells      |         |                 | [facility]    |              |
|             |         |                 | MEDICAL     |              |
|             |         |                 | CENTER -    |              |
|             |         |                 | LABORATORY  |              |
+-------------+---------+-----------------+-------------+--------------+
 
| Red Blood   | 3.85    | 3.70 - 5.20     | [facility]  |              |
| Cells       |         | M/uL            | [facility]    |              |
|             |         |                 | MEDICAL     |              |
|             |         |                 | CENTER -    |              |
|             |         |                 | LABORATORY  |              |
+-------------+---------+-----------------+-------------+--------------+
| Hemoglobin  | 12.5    | 11.5 - 16.0     | [facility]  |              |
|             |         | g/dL            | [facility]    |              |
|             |         |                 | MEDICAL     |              |
|             |         |                 | CENTER -    |              |
|             |         |                 | LABORATORY  |              |
+-------------+---------+-----------------+-------------+--------------+
| Hematocrit  | 36.4    | 34.0 - 47.0 %   | [facility]  |              |
|             |         |                 | [facility]    |              |
|             |         |                 | MEDICAL     |              |
|             |         |                 | CENTER -    |              |
|             |         |                 | LABORATORY  |              |
+-------------+---------+-----------------+-------------+--------------+
| MCV         | 94.6    | 83.0 - 101.0 fL | [facility]  |              |
|             |         |                 | [facility]    |              |
|             |         |                 | MEDICAL     |              |
|             |         |                 | CENTER -    |              |
|             |         |                 | LABORATORY  |              |
+-------------+---------+-----------------+-------------+--------------+
| MCH         | 32.5    | 28.0 - 35.0 pg  | [facility]  |              |
|             |         |                 | [facility]    |              |
|             |         |                 | MEDICAL     |              |
|             |         |                 | CENTER -    |              |
|             |         |                 | LABORATORY  |              |
+-------------+---------+-----------------+-------------+--------------+
| MCHC        | 34.4    | 32.0 - 36.0     | [facility]  |              |
|             |         | g/dL            | [facility]    |              |
|             |         |                 | MEDICAL     |              |
|             |         |                 | CENTER -    |              |
|             |         |                 | LABORATORY  |              |
+-------------+---------+-----------------+-------------+--------------+
| RDW-CV      | 13.1    | <15.0 %         | [facility]  |              |
|             |         |                 | [facility]    |              |
|             |         |                 | MEDICAL     |              |
|             |         |                 | CENTER -    |              |
|             |         |                 | LABORATORY  |              |
+-------------+---------+-----------------+-------------+--------------+
| Platelet    | 125 (L) | 140 - 440 K/uL  | [facility]  |              |
| Count       |         |                 | [facility]    |              |
|             |         |                 | MEDICAL     |              |
|             |         |                 | CENTER -    |              |
|             |         |                 | LABORATORY  |              |
+-------------+---------+-----------------+-------------+--------------+
| MPV         | 9.6     | fL              | [facility]  |              |
|             |         |                 | [facility]    |              |
|             |         |                 | MEDICAL     |              |
|             |         |                 | CENTER -    |              |
|             |         |                 | LABORATORY  |              |
+-------------+---------+-----------------+-------------+--------------+
 
 
 
+----------+
| Specimen |
+----------+
 
| Blood    |
+----------+
 
 
 
+----------------------+--------------------+--------------------+----------------+
| Performing           | Address            | City/State/Zipcode | Phone Number   |
| Organization         |                    |                    |                |
+----------------------+--------------------+--------------------+----------------+
|   [facility]     |   [address] |   [place]  |   [phone] |
| [facility]  |                    | [address]              |                |
| - LABORATORY         |                    |                    |                |
+----------------------+--------------------+--------------------+----------------+
 D-Dimer (2017  3:49 AM PST)
 
+-------------+--------------------------+--------------+-------------+--------------+
| Component   | Value                    | Ref Range    | Performed   | Pathologist  |
|             |                          |              | At          | Signature    |
+-------------+--------------------------+--------------+-------------+--------------+
| D-Dimer     | 0.49Comment: This        | <=0.50 ug/ml | [facility]  |              |
| Quantitativ | quantitative D-Dimer     |              | [facility]    |              |
| e           | assay has been evaluated |              | MEDICAL     |              |
|             |  for screening for       |              | CENTER -    |              |
|             | venous thrombotic        |              | LABORATORY  |              |
|             | disease, and may be      |              |             |              |
|             | useful in ruling out,    |              |             |              |
|             | but not ruling in        |              |             |              |
|             | disease. Values less     |              |             |              |
|             | than 0.50 ug/mL FEU      |              |             |              |
|             | (Fibrinogen Equivalent   |              |             |              |
|             | Units) have a negative   |              |             |              |
|             | predictive value of      |              |             |              |
|             | approximately 95% for    |              |             |              |
|             | ruling out large         |              |             |              |
|             | pulmonary emboli or      |              |             |              |
|             | proximal deep vein       |              |             |              |
|             | thrombosis. Distal DVT   |              |             |              |
|             | are not excluded. An     |              |             |              |
|             | elevated D-dimer can be  |              |             |              |
|             | present in patients with |              |             |              |
|             |  liver disease,          |              |             |              |
|             | pregnancy, eclampsia,    |              |             |              |
|             | heart disease and some   |              |             |              |
|             | cancers among other      |              |             |              |
|             | conditions. The presence |              |             |              |
|             |  of rheumatoid factor at |              |             |              |
|             |  a level >50 IU/mL may   |              |             |              |
|             | falsely elevate the      |              |             |              |
|             | determined D-dimer       |              |             |              |
|             | levels.                  |              |             |              |
+-------------+--------------------------+--------------+-------------+--------------+
 
 
 
+----------+
| Specimen |
+----------+
| Blood    |
+----------+
 
 
 
 
+----------------------+--------------------+--------------------+----------------+
| Performing           | Address            | City/State/Zipcode | Phone Number   |
| Organization         |                    |                    |                |
+----------------------+--------------------+--------------------+----------------+
|   [facility]     |   [address] |   [place]  |   [phone] |
| [facility]  |                    | [id number]              |                |
| - LABORATORY         |                    |                    |                |
+----------------------+--------------------+--------------------+----------------+
 Basic Metabolic Panel (2017  3:49 AM PST)
 
+-------------+--------------------------+-----------------+-------------+--------------+
| Component   | Value                    | Ref Range       | Performed   | Pathologist  |
|             |                          |                 | At          | Signature    |
+-------------+--------------------------+-----------------+-------------+--------------+
| Na          | 141                      | 136 - 149       | [facility]  |              |
|             |                          | mmol/L          | [facility]    |              |
|             |                          |                 | [facility]     |              |
|             |                          |                 | [facility]ENTER -    |              |
|             |                          |                 | LABORATORY  |              |
+-------------+--------------------------+-----------------+-------------+--------------+
| K           | 3.8                      | 3.5 - 5.1       | [facility]  |              |
|             |                          | mmol/L          | [facility]    |              |
|             |                          |                 | [facility]     |              |
|             |                          |                 | CENTER -    |              |
|             |                          |                 | LABORATORY  |              |
+-------------+--------------------------+-----------------+-------------+--------------+
| Cl          | 105                      | 98 - 109 mmol/L | [facility]  |              |
|             |                          |                 | [facility]    |              |
|             |                          |                 | MED[facility]     |              |
|             |                          |                 | CENTER -    |              |
|             |                          |                 | LABORATORY  |              |
+-------------+--------------------------+-----------------+-------------+--------------+
| CO2         | 29                       | 24 - 31 mmol/L  | [facility]  |              |
|             |                          |                 | [facility]    |              |
|             |                          |                 | MED[facility]     |              |
|             |                          |                 | CENTER -    |              |
|             |                          |                 | LABORATORY  |              |
+-------------+--------------------------+-----------------+-------------+--------------+
| Anion Gap   | 7                        | 3 - 16 mmol/L   | [facility]  |              |
|             |                          |                 | [facility]    |              |
|             |                          |                 | MED[facility]     |              |
|             |                          |                 | CENTER -    |              |
|             |                          |                 | LABORATORY  |              |
+-------------+--------------------------+-----------------+-------------+--------------+
| Glucose     | 95                       | 70 - 109 mg/dL  | [facility]  |              |
|             |                          |                 | [facility]    |              |
|             |                          |                 | MED[facility]     |              |
|             |                          |                 | CENTER -    |              |
|             |                          |                 | LABORATORY  |              |
+-------------+--------------------------+-----------------+-------------+--------------+
| BUN         | 10                       | 7 - 18 mg/dL    | [facility]  |              |
|             |                          |                 | [facility]    |              |
|             |                          |                 | MED[facility]     |              |
|             |                          |                 | CENTER -    |              |
|             |                          |                 | LABORATORY  |              |
+-------------+--------------------------+-----------------+-------------+--------------+
| Creatinine  | 0.72                     | 0.60 - 1.30     | [facility]  |              |
|             |                          | mg/dL           | [facility]    |              |
 
|             |                          |                 | MEDICAL     |              |
|             |                          |                 | CENTER -    |              |
|             |                          |                 | LABORATORY  |              |
+-------------+--------------------------+-----------------+-------------+--------------+
| eGFR if not | >60Comment: GLOMERULAR   | >=60            | [facility]  |              |
|      | FILTRATION               | mL/min/1.73m2   | [facility]    |              |
| [employer]    | RATE,ESTIMATED           |                 | MEDICAL     |              |
|             |   mL/min/1.35r0Vmxv than |                 | CENTER -    |              |
|             |  60    Chronic kidney    |                 | LABORATORY  |              |
|             | disease,if found over a  |                 |             |              |
|             | 3-month period.Less than |                 |             |              |
|             |  15    Kidney failureFor |                 |             |              |
|             |                   |                 |             |              |
|             | Americans,multiply the   |                 |             |              |
|             | calculated GFR by 1.21.  |                 |             |              |
|             |                          |                 |             |              |
+-------------+--------------------------+-----------------+-------------+--------------+
| Calcium     | 8.8                      | 8.3 - 10.5      | [facility]  |              |
|             |                          | mg/dL           | [facility]    |              |
|             |                          |                 | MEDICAL     |              |
|             |                          |                 | CENTER -    |              |
|             |                          |                 | LABORATORY  |              |
+-------------+--------------------------+-----------------+-------------+--------------+
| BUN/Creatin | 13.9                     |                 | [facility]  |              |
| ine Ratio   |                          |                 | [facility]    |              |
|             |                          |                 | MEDICAL     |              |
|             |                          |                 | CENTER -    |              |
|             |                          |                 | LABORATORY  |              |
+-------------+--------------------------+-----------------+-------------+--------------+
 
 
 
+----------+
| Specimen |
+----------+
| Blood    |
+----------+
 
 
 
+----------------------+--------------------+--------------------+----------------+
| Performing           | Address            | City/State/Zipcode | Phone Number   |
| Organization         |                    |                    |                |
+----------------------+--------------------+--------------------+----------------+
|   [facility]     |   [address] |   [place]  |   [phone] |
| [facility]  |                    | [id number]              |                |
| - LABORATORY         |                    |                    |                |
+----------------------+--------------------+--------------------+----------------+
 D-Dimer (2017 12:33 PM PST)
 
+-------------+--------------------------+--------------+-------------+--------------+
| Component   | Value                    | Ref Range    | Performed   | Pathologist  |
|             |                          |              | At          | Signature    |
+-------------+--------------------------+--------------+-------------+--------------+
| D-Dimer     | 0.61 (H)Comment: This    | <=0.50 ug/ml | [facility]  |              |
| Quantitativ | quantitative D-Dimer     |              | [facility]    |              |
| e           | assay has been evaluated |              | MEDICAL     |              |
|             |  for screening for       |              | CENTER -    |              |
|             | venous thrombotic        |              | LABORATORY  |              |
|             | disease, and may be      |              |             |              |
 
|             | useful in ruling out,    |              |             |              |
|             | but not ruling in        |              |             |              |
|             | disease. Values less     |              |             |              |
|             | than 0.50 ug/mL FEU      |              |             |              |
|             | (Fibrinogen Equivalent   |              |             |              |
|             | Units) have a negative   |              |             |              |
|             | predictive value of      |              |             |              |
|             | approximately 95% for    |              |             |              |
|             | ruling out large         |              |             |              |
|             | pulmonary emboli or      |              |             |              |
|             | proximal deep vein       |              |             |              |
|             | thrombosis. Distal DVT   |              |             |              |
|             | are not excluded. An     |              |             |              |
|             | elevated D-dimer can be  |              |             |              |
|             | present in patients with |              |             |              |
|             |  liver disease,          |              |             |              |
|             | pregnancy, eclampsia,    |              |             |              |
|             | heart disease and some   |              |             |              |
|             | cancers among other      |              |             |              |
|             | conditions. The presence |              |             |              |
|             |  of rheumatoid factor at |              |             |              |
|             |  a level >50 IU/mL may   |              |             |              |
|             | falsely elevate the      |              |             |              |
|             | determined D-dimer       |              |             |              |
|             | levels.                  |              |             |              |
+-------------+--------------------------+--------------+-------------+--------------+
 
 
 
+----------+
| Specimen |
+----------+
| Blood    |
+----------+
 
 
 
+----------------------+--------------------+--------------------+----------------+
| Performing           | Address            | City/State/Zipcode | Phone Number   |
| Organization         |                    |                    |                |
+----------------------+--------------------+--------------------+----------------+
|   [facility]     |   [address] |   [place]  |   [phone] |
| [facility]  |                    | [id number]              |                |
| - LABORATORY         |                    |                    |                |
+----------------------+--------------------+--------------------+----------------+
 ECHO Complete (2017 11:00 AM PST)
 
+-------------+-------+-----------+------------+--------------+
| Component   | Value | Ref Range | Performed  | Pathologist  |
|             |       |           | At         | Signature    |
+-------------+-------+-----------+------------+--------------+
| LVEF-TTE    | 62    |           |            |              |
| [facility]ST[facility]ORAC |       |           |            |              |
| IC ECHO     |       |           |            |              |
+-------------+-------+-----------+------------+--------------+
 
 
 
+----------+
| Specimen |
 
+----------+
|          |
+----------+
 
 
 
+----------------------------------------------------------------------------------------+--
------------+
| Narrative                                                                              | P
erformed At |
+----------------------------------------------------------------------------------------+--
------------+
|   This result has an attachment that is not available.  Transthoracic                  |  
            |
| Echocardiography Report (TTE)  Demographics  Patient Name      YOU                   |  
            |
| SERA Room Number                   453  Patient Number   [id number]                 |  
            |
|    Date of Study                2017  Visit Number                               |  
            |
|   [id number]  Accession          [id number]    Referring Physician                    |  
            |
|     ANASTASIA BRASHER MD Number  Date of Birth    1945                              |  
            |
| Sonographer                   [facility]  Age                               |  
            |
|       71 year(s)    Interpreting                  ANASTASIA BRASHER MD                    |  
            |
|                                           Cardiologist  Gender                         |  
            |
|         Female          Nurse Procedure Type of Study  TTE procedure:                  |  
            |
| ECHO Complete. Procedure dateDate: 2017Start: 10:22 AM Technical                 |  
            |
|  Quality: Adequate visualizationStudy Location: ICU Patient Status:                    |  
            |
| RoutineHeight: 62.99 inchesWeight: 163 poundsBSA: 1.77 m^2BMI: 28.88                   |  
            |
| kg/m^2HR: 54 bpm ConclusionsSummaryNormal Left Ventricular                             |  
            |
| contractility was noted.Left ventricle is normal in size and function.                 |  
            |
|  Ejection fraction isestimated at 62%.Trace AIDilated left                             |  
            |
| atrium.aortic root at the upper limits of normal in sizenormal IVC                     |  
            |
| Signature-------------------------------------------------------------                 |  
            |
| --------------- Electronically signed by ANASTASIA BRASHER                                |  
            |
| MD(Interpreting physician) on 2017 01:08                                         |  
            |
| PM--------------------------------------------------------------------                 |  
            |
| -------- FindingsMitral ValveStructurally normal mitral valve without                  |  
            |
| significant stenosis orregurgitation.Aortic ValveTrace AITricuspid                     |  
            |
| ValveTrace TRnot adequate to calculate PA pressurePulmonic ValveThe                    |  
            |
 
| pulmonic valve was not well visualized.Left AtriumDilated left                         |  
            |
| atrium.Left VentricleLeft ventricle is normal in size and function.                    |  
            |
| Ejection fraction isestimated at 62%.Right AtriumNormal right                          |  
            |
| atrium.Right VentricleNormal right ventricular structure and                           |  
            |
| function.Pericardial EffusionNo evidence of pericardial effusion.                      |  
            |
| Miscellaneousaortic root at the upper limits of normal in sizenormal                   |  
            |
| IVC Valves  Mitral Valve  Peak E-Wave: 0.84 m/s Peak A-Wave: 0.45 m/s                  |  
            |
|  Tissue Doppler  Septal e' Velocity: 0.08 m/s Septal E/e' Ratio:10.49                  |  
            |
|  Aortic Valve        Area (continuity): 0.98 cm^2       Mean Gradient:                 |  
            |
|  3.31 mmHg  LVOT  Peak Velocity: 0.81 m/s LVOT Diameter: 1.25 cm                       |  
            |
| Structures  Left Atrium  LA A/P Dimension: 4.87 cm                                     |  
            |
|               LA Area: 19.01 cm^2 LA Vol/BSA Index: 30 mL/m^2                          |  
            |
|                      LA Volume: 53.51 ml                                               |  
            |
|                                           EF Eoatkbgod53%  Left                        |  
            |
| Ventricle  Diastolic Dimension: 4.81 cm             Systolic                           |  
            |
| Dimension: 2.84 cm Septum Diastolic: 0.91 cm PW Diastolic: 0.91 cm EF                  |  
            |
| Calculated: 62%  Miscellaneous  Aorta  Aortic Root: 3.56 cm Ascending                  |  
            |
| Aorta: 2.3 cm                                                                          |  
            |
|aortic root at the upper limits of normal in size                                       |  
            |
|normal IVC                                                                              |  
            |
|                                                                                        |  
            |
|Signature                                                                              |   
           |
|----------------------------------------------------------------------------             | 
             |
| Electronically signed by VINCE HERMOSILLOInterpreting physician) on                  |  
            |
| 2017 01:08 PM                                                                    |  
            |
|----------------------------------------------------------------------------             | 
             |
|                                                                                        |  
            |
|Findings                                                                               |   
           |
|Mitral Valve                                                                            |  
            |
|Structurally normal mitral valve without significant stenosis or                        |  
            |
 
|regurgitation.                                                                         |   
           |
|Aortic Valve                                                                            |  
            |
|Trace AI                                                                                |  
            |
|Tricuspid Valve                                                                         |  
            |
|Trace TR                                                                                |  
            |
|not adequate to calculate PA pressure                                                   |  
            |
|Pulmonic Valve                                                                          |  
            |
|The pulmonic valve was not well visualized.                                             |  
            |
|Left Atrium                                                                             |  
            |
|Dilated left atrium.                                                                    |  
            |
|Left Ventricle                                                                          |  
            |
|Left ventricle is normal in size and function. Ejection fraction is                     |  
            |
|estimated at 62%.                                                                       |  
            |
|Right Atrium                                                                            |  
            |
|Normal right atrium.                                                                    |  
            |
|Right Ventricle                                                                         |  
            |
|Normal right ventricular structure and function.                                        |  
            |
|Pericardial Effusion                                                                    |  
            |
|No evidence of pericardial effusion.                                                    |  
            |
|                                                                                        |  
            |
|Miscellaneous                                                                          |   
           |
|aortic root at the upper limits of normal in size                                       |  
            |
|normal IVC                                                                              |  
            |
|                                                                                        |  
            |
|Valves                                                                                 |   
           |
|                                                                                        |  
            |
| Mitral Valve                                                                           |  
            |
|                                                                                        |  
            |
| Peak E-Wave: 0.84 m/s                                                                  |  
            |
| Peak A-Wave: 0.45 m/s                                                                  |  
            |
 
|                                                                                        |  
            |
| Tissue Doppler                                                                         |  
            |
|                                                                                        |  
            |
| Septal e' Velocity: 0.08 m/s                                                           |  
            |
| Septal E/e' Ratio:10.49                                                                |  
            |
|                                                                                        |  
            |
| Aortic Valve                                                                           |  
            |
|                                                                                        |  
            |
|       Area (continuity): 0.98 cm^2                                                     |  
            |
|       Mean Gradient: 3.31 mmHg                                                         |  
            |
|                                                                                        |  
            |
| LVOT                                                                                   |  
            |
|                                                                                        |  
            |
| Peak Velocity: 0.81 m/s                                                                |  
            |
| LVOT Diameter: 1.25 cm                                                                 |  
            |
|                                                                                        |  
            |
|Structures                                                                             |   
           |
|                                                                                        |  
            |
| Left Atrium                                                                            |  
            |
|                                                                                        |  
            |
| LA A/P Dimension: 4.87 cm                                 LA Area: 19.01 cm^2          |  
            |
| LA Vol/BSA Index: 30 mL/m^2                              LA Volume: 53.51 ml           |  
            |
|                                                                        EF Kebpqouud26% |  
            |
|                                                                                        |  
            |
| Left Ventricle                                                                         |  
            |
|                                                                                        |  
            |
| Diastolic Dimension: 4.81 cm             Systolic Dimension: 2.84 cm                   |  
            |
| Septum Diastolic: 0.91 cm                                                              |  
            |
| PW Diastolic: 0.91 cm                                                                  |  
            |
| EF Calculated: 62%                                                                     |  
            |
 
|                                                                                        |  
            |
| Miscellaneous                                                                          |  
            |
|                                                                                        |  
            |
| Aorta                                                                                  |  
            |
|                                                                                        |  
            |
| Aortic Root: 3.56 cm                                                                   |  
            |
| Ascending Aorta: 2.3 cm                                                                |  
            |
|                                                                                        |  
            |
+----------------------------------------------------------------------------------------+--
------------+
 
 
 
+------------------------------------------------------------------------------------------+
| Procedure Note                                                                           |
+------------------------------------------------------------------------------------------+
|   Ace, Rad Results In - 2017  1:08 PM [facility]  Transthoracic Echocardiography Report   |
| (TTE) Demographics Patient Name    YOU LOERA Room Number             453 Patient      |
| Number  [id number]  Date of Study           2017 Visit Number    [id number]      |
| Accession       [id number]   Referring Physician     ANASTASIA BRASHER MD Number Date of    |
| Birth   1945   Sonographer             CAROL ANGELO,  Age             71    |
| year(s)   Interpreting            ANASTASIA BRASHER MD                                      |
| Cardiologist Gender          Female       NurseProcedureType of Study TTE procedure:     |
| ECHO Complete.Procedure dateDate: 2017Start: 10:22 AMTechnical Quality: Adequate   |
| visualizationStudy Location: ICUPatient Status: RoutineHeight: 62.99 inchesWeight: 163   |
| poundsBSA: 1.77 m^2BMI: 28.88 kg/m^2HR: 54 bpmConclusionsSummaryNormal Left Ventricular  |
| contractility was noted.Left ventricle is normal in size and function. Ejection fraction |
|  isestimated at 62%.Trace AIDilated left atrium.aortic root at the upper limits of       |
| normal in sizenormal                                                                     |
| IVCSignature---------------------------------------------------------------------------- |
|  Electronically signed by ANASTASIA BRASHER MD(Interpreting physician) on 2017 01:08  |
| PM----------------------------------------------------------------------------FindingsMi |
| tral ValveStructurally normal mitral valve without significant stenosis                  |
| orregurgitation.Aortic ValveTrace AITricuspid ValveTrace TRnot adequate to calculate PA  |
| pressurePulmonic ValveThe pulmonic valve was not well visualized.Left AtriumDilated left |
|  atrium.Left VentricleLeft ventricle is normal in size and function. Ejection fraction   |
| isestimated at 62%.Right AtriumNormal right atrium.Right VentricleNormal right           |
| ventricular structure and function.Pericardial EffusionNo evidence of pericardial        |
| effusion.Miscellaneousaortic root at the upper limits of normal in sizenormal IVCValves  |
| Mitral Valve Peak E-Wave: 0.84 m/s Peak A-Wave: 0.45 m/s Tissue Doppler Septal e'        |
| Velocity: 0.08 m/s Septal E/e' Ratio:10.49 Aortic Valve     Area (continuity): 0.98 cm^2 |
|      Mean Gradient: 3.31 mmHg LVOT Peak Velocity: 0.81 m/s LVOT Diameter: 1.25           |
| cmStructures Left Atrium LA A/P Dimension: 4.87 cm                      LA Area: 19.01   |
| cm^2 LA Vol/BSA Index: 30 mL/m^2                    LA Volume: 53.51 ml                  |
|                                EF Wxclrjjuq47% Left Ventricle Diastolic Dimension: 4.81  |
| cm         Systolic Dimension: 2.84 cm Septum Diastolic: 0.91 cm PW Diastolic: 0.91 cm   |
| EF Calculated: 62% Miscellaneous Aorta Aortic Root: 3.56 cm Ascending Aorta: 2.3 cm      |
|Patient Status: Routine                                                                   |
|Height: 62.99 inchesWeight: 163 poundsBSA: 1.77 m^2BMI: 28.88 kg/m^2                      |
|HR: 54 bpm                                                                                |
|                                                                                          |
|Conclusions                                                                              |
 
|Summary                                                                                  |
|Normal Left Ventricular contractility was noted.                                          |
|Left ventricle is normal in size and function. Ejection fraction is                       |
|estimated at 62%.                                                                         |
|Trace AI                                                                                  |
|Dilated left atrium.                                                                      |
|aortic root at the upper limits of normal in size                                         |
|normal IVC                                                                                |
|                                                                                          |
|Signature                                                                                |
|----------------------------------------------------------------------------               
|
| Electronically signed by VINCE HERMOSILLOInterpreting physician) on                    |
| 2017 01:08 PM                                                                      |
|----------------------------------------------------------------------------               
|
|                                                                                          |
|Findings                                                                                 |
|Mitral Valve                                                                              |
|Structurally normal mitral valve without significant stenosis or                          |
|regurgitation.                                                                           |
|Aortic Valve                                                                              |
|Trace AI                                                                                  |
|Tricuspid Valve                                                                           |
|Trace TR                                                                                  |
|not adequate to calculate PA pressure                                                     |
|Pulmonic Valve                                                                            |
|The pulmonic valve was not well visualized.                                               |
|Left Atrium                                                                               |
|Dilated left atrium.                                                                      |
|Left Ventricle                                                                            |
|Left ventricle is normal in size and function. Ejection fraction is                       |
|estimated at 62%.                                                                         |
|Right Atrium                                                                              |
|Normal right atrium.                                                                      |
|Right Ventricle                                                                           |
|Normal right ventricular structure and function.                                          |
|Pericardial Effusion                                                                      |
|No evidence of pericardial effusion.                                                      |
|                                                                                          |
|Miscellaneous                                                                            |
|aortic root at the upper limits of normal in size                                         |
|normal IVC                                                                                |
|                                                                                          |
|Valves                                                                                   |
|                                                                                          |
| Mitral Valve                                                                             |
|                                                                                          |
| Peak E-Wave: 0.84 m/s                                                                    |
| Peak A-Wave: 0.45 m/s                                                                    |
|                                                                                          |
| Tissue Doppler                                                                           |
|                                                                                          |
| Septal e' Velocity: 0.08 m/s                                                             |
| Septal E/e' Ratio:10.49                                                                  |
|                                                                                          |
| Aortic Valve                                                                             |
|                                                                                          |
|     Area (continuity): 0.98 cm^2                                                         |
|     Mean Gradient: 3.31 mmHg                                                             |
 
|                                                                                          |
| LVOT                                                                                     |
|                                                                                          |
| Peak Velocity: 0.81 m/s                                                                  |
| LVOT Diameter: 1.25 cm                                                                   |
|                                                                                          |
|Structures                                                                               |
|                                                                                          |
| Left Atrium                                                                              |
|                                                                                          |
| LA A/P Dimension: 4.87 cm                      LA Area: 19.01 cm^2                       |
| LA Vol/BSA Index: 30 mL/m^2                    LA Volume: 53.51 ml                       |
|                                                EF Mrcjevkje07%                           |
|                                                                                          |
| Left Ventricle                                                                           |
|                                                                                          |
| Diastolic Dimension: 4.81 cm         Systolic Dimension: 2.84 cm                         |
| Septum Diastolic: 0.91 cm                                                                |
| PW Diastolic: 0.91 cm                                                                    |
| EF Calculated: 62%                                                                       |
|                                                                                          |
| Miscellaneous                                                                            |
|                                                                                          |
| Aorta                                                                                    |
|                                                                                          |
| Aortic Root: 3.56 cm                                                                     |
| Ascending Aorta: 2.3 cm                                                                  |
+------------------------------------------------------------------------------------------+
 ECG 12 lead (2017  6:03 AM PST)
 
+-------------+--------------------------+-----------+------------+--------------+
| Component   | Value                    | Ref Range | Performed  | Pathologist  |
|             |                          |           | At         | Signature    |
+-------------+--------------------------+-----------+------------+--------------+
| VENTRICULAR | 56                       | BPM       | [facility]  |              |
|  RATE EKG   |                          |           |            |              |
+-------------+--------------------------+-----------+------------+--------------+
| ATRIAL RATE | 56                       | BPM       | [facility]  |              |
+-------------+--------------------------+-----------+------------+--------------+
| P-R         | 148                      | ms        | [facility]  |              |
| INTERVAL    |                          |           |            |              |
+-------------+--------------------------+-----------+------------+--------------+
| QRS         | 80                       | ms        | [facility]  |              |
| DURATION    |                          |           |            |              |
+-------------+--------------------------+-----------+------------+--------------+
| Q-T         | 470                      | ms        | [facility]  |              |
| INTERVAL    |                          |           |            |              |
+-------------+--------------------------+-----------+------------+--------------+
| Q-T         | 453                      | ms        | [facility]  |              |
| INTERVAL    |                          |           |            |              |
| (CORRECTED) |                          |           |            |              |
+-------------+--------------------------+-----------+------------+--------------+
| P WAVE AXIS | 19                       | degrees   | [facility]  |              |
+-------------+--------------------------+-----------+------------+--------------+
| QRS AXIS    | 4                        | degrees   | [facility]  |              |
+-------------+--------------------------+-----------+------------+--------------+
| T AXIS      | 49                       | degrees   | [facility]  |              |
+-------------+--------------------------+-----------+------------+--------------+
| INTERPRETAT | Sinus bradycardiaLow     |           | [facility]  |              |
| ION TEXT    | voltage QRSBorderline    |           |            |              |
 
|             | ECGWhen compared with    |           |            |              |
|             | ECG of 2017       |           |            |              |
|             | 12:50,No significant     |           |            |              |
|             | change was               |           |            |              |
|             | foundConfirmed by        |           |            |              |
|             | ANASTASIA BRASHER MD        |           |            |              |
|             | ([id number]) on 2017      |           |            |              |
|             | 9:45:24 AMAlso confirmed |           |            |              |
|             |  by MANJINDER FINE MD      |           |            |              |
|             | ([id number])   on 2017    |           |            |              |
|             | 9:52:00 AM               |           |            |              |
+-------------+--------------------------+-----------+------------+--------------+
 
 
 
+----------+
| Specimen |
+----------+
|          |
+----------+
 
 
 
+----------------------------------------------------------+--------------+
| Narrative                                                | Performed At |
+----------------------------------------------------------+--------------+
|   This result has an attachment that is not available.   |              |
+----------------------------------------------------------+--------------+
 
 
 
+--------------+---------+--------------------+--------------+
| Performing   | Address | City/State/Zipcode | Phone Number |
| Organization |         |                    |              |
+--------------+---------+--------------------+--------------+
|   [facility]  |         |                    |              |
+--------------+---------+--------------------+--------------+
 Lipid Panel (2017  4:47 AM PST)
 
+-------------+-------------------------+-----------------+-------------+--------------+
| Component   | Value                   | Ref Range       | Performed   | Pathologist  |
|             |                         |                 | At          | Signature    |
+-------------+-------------------------+-----------------+-------------+--------------+
| Triglycerid | 122                     | 35 - 160 mg/dL  | [facility]  |              |
| es          |                         |                 | [facility]    |              |
|             |                         |                 | [facility]     |              |
|             |                         |                 | [facility] -    |              |
|             |                         |                 | LABORATORY  |              |
+-------------+-------------------------+-----------------+-------------+--------------+
| Cholesterol | 149 (L)                 | 150 - 200 mg/dL | [facility]  |              |
|             |                         |                 | [facility]    |              |
|             |                         |                 | [facility]     |              |
|             |                         |                 | [facility] -    |              |
|             |                         |                 | LABORATORY  |              |
+-------------+-------------------------+-----------------+-------------+--------------+
| HDL         | 44Comment:     New HDL  | 28 - 83 mg/dL   | [facility]  |              |
|             | Reference Range as of   |                 | [facility]    |              |
|             | September 10, 2015      |                 | MEDICAL     |              |
|             | Values may be 10-20%    |                 | CENTER -    |              |
|             | lower with new,         |                 | LABORATORY  |              |
 
|             | standardized method.    |                 |             |              |
+-------------+-------------------------+-----------------+-------------+--------------+
| Chol/HDL    | 3.4                     |                 | [facility]  |              |
| Ratio       |                         |                 | [facility]    |              |
|             |                         |                 | [facility]     |              |
|             |                         |                 | [facility] -    |              |
|             |                         |                 | LABORATORY  |              |
+-------------+-------------------------+-----------------+-------------+--------------+
| LDL,        | 81                      | <=130 mg/dL     | [facility]  |              |
| Calculated  |                         |                 | [facility]    |              |
|             |                         |                 | [facility]     |              |
|             |                         |                 | [facility] -    |              |
|             |                         |                 | LABORATORY  |              |
+-------------+-------------------------+-----------------+-------------+--------------+
 
 
 
+----------+
| Specimen |
+----------+
| Blood    |
+----------+
 
 
 
+----------------------+--------------------+--------------------+----------------+
| Performing           | Address            | City/State/Zipcode | Phone Number   |
| Organization         |                    |                    |                |
+----------------------+--------------------+--------------------+----------------+
|   [facility]     |   [address] |   [place], [place]  |   [phone] |
| [facility]  |                    | [id number]              |                |
| - LABORATORY         |                    |                    |                |
+----------------------+--------------------+--------------------+----------------+
 Extra Lavender Top Tube (2017  4:47 AM PST)
 
+-----------+-------+-----------+-------------+--------------+
| Component | Value | Ref Range | Performed   | Pathologist  |
|           |       |           | At          | Signature    |
+-----------+-------+-----------+-------------+--------------+
| Extra     | Done  |           | [facility]  |              |
| Lavender  |       |           | [facility]    |              |
| Top Tube  |       |           | MEDICAL     |              |
|           |       |           | CENTER -    |              |
|           |       |           | LABORATORY  |              |
+-----------+-------+-----------+-------------+--------------+
 
 
 
+----------+
| Specimen |
+----------+
| Blood    |
+----------+
 
 
 
+----------------------+--------------------+--------------------+----------------+
| Performing           | Address            | City/State/Zipcode | Phone Number   |
| Organization         |                    |                    |                |
+----------------------+--------------------+--------------------+----------------+
 
|   [facility]     |   [address] |   [place]  |   [phone] |
| [facility]  |                    | [id number]              |                |
| - LABORATORY         |                    |                    |                |
+----------------------+--------------------+--------------------+----------------+
 Comprehensive Metabolic Panel (2017  4:47 AM PST)
 
+-------------+--------------------------+-----------------+-------------+--------------+
| Component   | Value                    | Ref Range       | Performed   | Pathologist  |
|             |                          |                 | At          | Signature    |
+-------------+--------------------------+-----------------+-------------+--------------+
| Na          | 144                      | 136 - 149       | [facility]  |              |
|             |                          | mmol/L          | [facility]    |              |
|             |                          |                 | MEDICAL     |              |
|             |                          |                 | CENTER -    |              |
|             |                          |                 | LABORATORY  |              |
+-------------+--------------------------+-----------------+-------------+--------------+
| K           | 3.4 (L)                  | 3.5 - 5.1       | [facility]  |              |
|             |                          | mmol/L          | [facility]    |              |
|             |                          |                 | [facility]     |              |
|             |                          |                 | CENTER -    |              |
|             |                          |                 | LABORATORY  |              |
+-------------+--------------------------+-----------------+-------------+--------------+
| Cl          | 111 (H)                  | 98 - 109 mmol/L | [facility]  |              |
|             |                          |                 | [facility]    |              |
|             |                          |                 | MEDICAL     |              |
|             |                          |                 | CENTER -    |              |
|             |                          |                 | LABORATORY  |              |
+-------------+--------------------------+-----------------+-------------+--------------+
| CO2         | 25                       | 24 - 31 mmol/L  | [facility]  |              |
|             |                          |                 | [facility]    |              |
|             |                          |                 | MEDICAL     |              |
|             |                          |                 | CENTER -    |              |
|             |                          |                 | LABORATORY  |              |
+-------------+--------------------------+-----------------+-------------+--------------+
| Anion Gap   | 8                        | 3 - 16 mmol/L   | [facility]  |              |
|             |                          |                 | [facility]    |              |
|             |                          |                 | MEDICAL     |              |
|             |                          |                 | CENTER -    |              |
|             |                          |                 | LABORATORY  |              |
+-------------+--------------------------+-----------------+-------------+--------------+
| Glucose     | 82                       | 70 - 109 mg/dL  | [facility]  |              |
|             |                          |                 | [facility]    |              |
|             |                          |                 | MEDICAL     |              |
|             |                          |                 | CENTER -    |              |
|             |                          |                 | LABORATORY  |              |
+-------------+--------------------------+-----------------+-------------+--------------+
| BUN         | 8                        | 7 - 18 mg/dL    | [place]  |              |
|             |                          |                 | [facility]    |              |
|             |                          |                 | MEDICAL     |              |
|             |                          |                 | CENTER -    |              |
|             |                          |                 | LABORATORY  |              |
+-------------+--------------------------+-----------------+-------------+--------------+
| Creatinine  | 0.62                     | 0.60 - 1.30     | [place]  |              |
|             |                          | mg/dL           | [facility]    |              |
|             |                          |                 | MEDICAL     |              |
|             |                          |                 | CENTER -    |              |
|             |                          |                 | LABORATORY  |              |
+-------------+--------------------------+-----------------+-------------+--------------+
| eGFR if not | >60Comment: GLOMERULAR   | >=60            | [place]  |              |
|      | FILTRATION               | mL/min/1.73m2   | [facility]    |              |
 
| [employer]    | RATE,ESTIMATED           |                 | MEDICAL     |              |
|             |   mL/min/1.06y0Pzez than |                 | CENTER -    |              |
|             |  60    Chronic kidney    |                 | LABORATORY  |              |
|             | disease,if found over a  |                 |             |              |
|             | 3-month period.Less than |                 |             |              |
|             |  15    Kidney failureFor |                 |             |              |
|             |                   |                 |             |              |
|             | Americans,multiply the   |                 |             |              |
|             | calculated GFR by 1.21.  |                 |             |              |
|             |                          |                 |             |              |
+-------------+--------------------------+-----------------+-------------+--------------+
| Calcium     | 8.4                      | 8.3 - 10.5      | [facility]  |              |
|             |                          | mg/dL           | [facility]    |              |
|             |                          |                 | MEDICAL     |              |
|             |                          |                 | CENTER -    |              |
|             |                          |                 | LABORATORY  |              |
+-------------+--------------------------+-----------------+-------------+--------------+
| Albumin     | 3.4                      | 3.2 - 5.0 g/dL  | [facility]  |              |
|             |                          |                 | [facility]    |              |
|             |                          |                 | MEDICAL     |              |
|             |                          |                 | CENTER -    |              |
|             |                          |                 | LABORATORY  |              |
+-------------+--------------------------+-----------------+-------------+--------------+
| Bilirubin   | 0.6                      | 0.1 - 1.5 mg/dL | [facility]  |              |
| Total       |                          |                 | [facility]    |              |
|             |                          |                 | MEDICAL     |              |
|             |                          |                 | CENTER -    |              |
|             |                          |                 | LABORATORY  |              |
+-------------+--------------------------+-----------------+-------------+--------------+
| Total       | 5.6 (L)                  | 6.0 - 7.8 g/dL  | [facility]  |              |
| Protein     |                          |                 | [facility]    |              |
|             |                          |                 | MEDICAL     |              |
|             |                          |                 | CENTER -    |              |
|             |                          |                 | LABORATORY  |              |
+-------------+--------------------------+-----------------+-------------+--------------+
| AST         | 30                       | 10 - 42 U/L     | [facility]  |              |
|             |                          |                 | [facility]    |              |
|             |                          |                 | MEDICAL     |              |
|             |                          |                 | CENTER -    |              |
|             |                          |                 | LABORATORY  |              |
+-------------+--------------------------+-----------------+-------------+--------------+
| ALT         | 16                       | 6 - 45 U/L      | [facility]  |              |
|             |                          |                 | [facility]    |              |
|             |                          |                 | MEDICAL     |              |
|             |                          |                 | CENTER -    |              |
|             |                          |                 | LABORATORY  |              |
+-------------+--------------------------+-----------------+-------------+--------------+
| Alkaline    | 41                       | 40 - 110 U/L    | [facility]  |              |
| Phosphatase |                          |                 | [facility]    |              |
|             |                          |                 | MEDICAL     |              |
|             |                          |                 | CENTER -    |              |
|             |                          |                 | LABORATORY  |              |
+-------------+--------------------------+-----------------+-------------+--------------+
| Globulin    | 2.2                      | 2.1 - 3.8 g/dL  | [facility]  |              |
|             |                          |                 | [facility]    |              |
|             |                          |                 | MEDICAL     |              |
|             |                          |                 | CENTER -    |              |
|             |                          |                 | LABORATORY  |              |
+-------------+--------------------------+-----------------+-------------+--------------+
| Albumin/Carmela | 1.5                      | 0.8 - 2.0       | [facility]  |              |
 
| bulin Ratio |                          |                 | [facility]    |              |
|             |                          |                 | MEDICAL     |              |
|             |                          |                 | CENTER -    |              |
|             |                          |                 | LABORATORY  |              |
+-------------+--------------------------+-----------------+-------------+--------------+
| BUN/Creatin | 12.9                     |                 | [facility]  |              |
| ine Ratio   |                          |                 | [facility]    |              |
|             |                          |                 | MEDICAL     |              |
|             |                          |                 | CENTER -    |              |
|             |                          |                 | LABORATORY  |              |
+-------------+--------------------------+-----------------+-------------+--------------+
 
 
 
+----------+
| Specimen |
+----------+
| Blood    |
+----------+
 
 
 
+----------------------+--------------------+--------------------+----------------+
| Performing           | Address            | City/State/Zipcode | Phone Number   |
| Organization         |                    |                    |                |
+----------------------+--------------------+--------------------+----------------+
|   [facility]     |   [address] |   [place]  |   [phone] |
| [facility]  |                    | [address]              |                |
| - LABORATORY         |                    |                    |                |
+----------------------+--------------------+--------------------+----------------+
 Troponin I (2017  8:33 PM PST)
 
+------------+--------------------------+-------------+-------------+--------------+
| Component  | Value                    | Ref Range   | Performed   | Pathologist  |
|            |                          |             | At          | Signature    |
+------------+--------------------------+-------------+-------------+--------------+
| Troponin I | 2.14 ()Comment:        | <0.06 ng/mL | [facility]  |              |
|            | Critical Result called   |             | [facility]    |              |
|            | to and read back by      |             | MEDICAL     |              |
|            | Sivan Shin RN on  |             | CENTER -    |              |
|            | 2017 at 21:15 by     |             | LABORATORY  |              |
|            | Britney PEREZ Mace.          |             |             |              |
|            | Reference                |             |             |              |
|            | Ranges:0.00-0.06 =       |             |             |              |
|            | NORMAL>0.06       =      |             |             |              |
|            | SUSPICIOUS FOR           |             |             |              |
|            | MYOCARDIAL DAMAGE NOTE:  |             |             |              |
|            | Values greater than 0.50 |             |             |              |
|            |  ng/mL have been shown   |             |             |              |
|            | to be strongly           |             |             |              |
|            | associated with acute    |             |             |              |
|            | myocardial infarction.   |             |             |              |
|            | The [employer] College of  |             |             |              |
|            | Cardiology (ACC)         |             |             |              |
|            | recommends a decision    |             |             |              |
|            | limit of 0.06 ng/mL for  |             |             |              |
|            | this assay.   Results    |             |             |              |
|            | greater than 0.06 can    |             |             |              |
|            | reflect a pre-infarct    |             |             |              |
|            | acute coronary syndrome, |             |             |              |
 
|            |  but can also reflect    |             |             |              |
|            | myocardial necrosis or   |             |             |              |
|            | injury that is not due   |             |             |              |
|            | to coronary artery       |             |             |              |
|            | disease.   Some of these |             |             |              |
|            |  causes are sepsis,      |             |             |              |
|            | hypocolemia, atrial      |             |             |              |
|            | fibrillation, heart      |             |             |              |
|            | failure, pulmonary       |             |             |              |
|            | embolism, myocarditis,   |             |             |              |
|            | myocardial contusion,    |             |             |              |
|            | and renal failure.   The |             |             |              |
|            |  diagnosis of myocardial |             |             |              |
|            |  infarction should be    |             |             |              |
|            | based on a combination   |             |             |              |
|            | of the patient's         |             |             |              |
|            | clinical presentation    |             |             |              |
|            | and the clinical         |             |             |              |
|            | laboratory test results  |             |             |              |
|            | (especially serial       |             |             |              |
|            | troponin levels).        |             |             |              |
+------------+--------------------------+-------------+-------------+--------------+
 
 
 
+----------+
| Specimen |
+----------+
| Blood    |
+----------+
 
 
 
+----------------------+--------------------+--------------------+----------------+
| Performing           | Address            | City/State/Zipcode | Phone Number   |
| Organization         |                    |                    |                |
+----------------------+--------------------+--------------------+----------------+
|   [facility]     |   [address] |   [place]  |   [phone] |
| [facility]  |                    | [id number]              |                |
| - LABORATORY         |                    |                    |                |
+----------------------+--------------------+--------------------+----------------+
 CT Angiogram Chest Abdomen (2017  5:05 PM PST)
 
+----------+
| Specimen |
+----------+
|          |
+----------+
 
 
 
+------------------------------------------------------------------------+---------------+
| Narrative                                                              | Performed At  |
+------------------------------------------------------------------------+---------------+
|   CT ANGIOGRAM CHEST ABDOMEN W CONTRAST 2017 5:04 PM     HISTORY:  |   [facility] |
| Rule out dissection.     COMPARISON: None.     PROTOCOL: Axial images  |               |
| of the chest and abdomen were obtained before and after  uneventful    |               |
| administration of 85 mL Omnipaque 350. Coronal and sagittal            |               |
| reformations were acquired.     FINDINGS:  There is moderate           |               |
| atherosclerosis of the aorta extending into the branches. The          |               |
 
| ascending thoracic aorta is mildly ectatic and measures 3.8 cm. There  |               |
| is no  evidence for dissection. The celiac artery, SMA, and TWYLA are    |               |
| patent. The  bilateral renal arteries are normal. There is mild to     |               |
| moderate atherosclerosis  of the iliac arteries.     Neck base is      |               |
| normal. The heart is of normal size. The pulmonary arteries are        |               |
| unremarkable. SVC is normal. No enlarged lymph nodes are seen in the   |               |
| mediastinum, kourtney, or axilla. Trachea and esophagus are normal. There  |               |
| is mild  dependent atelectasis of the bilateral lungs.     The liver   |               |
| demonstrates normal parenchyma. The gallbladder is normal. Biliary     |               |
| ducts are unremarkable. The spleen is unremarkable. The pancreas       |               |
| demonstrates  normal parenchyma and a normal pancreatic duct. Adrenal  |               |
| glands are normal. The  right kidney and visualized ureter are normal. |               |
|  The left kidney and visualized  ureter are normal. Stomach is normal. |               |
|  Imaged small bowel and colon show no acute  findings. There is no     |               |
| significant abnormality in the portal veins, mesenteric  veins, or     |               |
| systemic veins. No enlarged lymph nodes are visualized within the      |               |
| omentum or retroperitoneum. There is no evidence for ascites or free   |               |
| air.     Body wall soft tissue structures are normal. There is mild    |               |
| S-shaped curvature of  the thoracolumbar spine. Mild to moderate       |               |
| spondylosis is present.     IMPRESSION -  No evidence for aortic       |               |
| dissection.     A preliminary report was sent by [facility] with  |               |
| no significant  discrepancy.     Dictated and Signed by: Kiana Joseph               |
|  MD    Electronically signed: 2017 8:43 AM                         |               |
+------------------------------------------------------------------------+---------------+
 
 
 
+------------------------------------------------------------------------------------------+
| Procedure Note                                                                           |
+------------------------------------------------------------------------------------------+
|   Ace, Rad Results In - 2017  8:46 AM PST  CT ANGIOGRAM CHEST ABDOMEN W CONTRAST   |
| 2017 5:04 PMHISTORY: Rule out dissection.COMPARISON: None.PROTOCOL: Axial images of  |
| the chest and abdomen were obtained before and afteruneventful administration of 85 mL   |
| Omnipaque 350. Coronal and sagittalreformations were acquired.FINDINGS:There is moderate |
|  atherosclerosis of the aorta extending into the branches. Theascending thoracic aorta   |
| is mildly ectatic and measures 3.8 cm. There is noevidence for dissection. The celiac    |
| artery, SMA, and TWYLA are patent. Thebilateral renal arteries are normal. There is mild   |
| to moderate atherosclerosisof the iliac arteries.Neck base is normal. The heart is of    |
| normal size. The pulmonary arteries areunremarkable. SVC is normal. No enlarged lymph    |
| nodes are seen in themediastinum, kourtney, or axilla. Trachea and esophagus are normal.     |
| There is milddependent atelectasis of the bilateral lungs.The liver demonstrates normal  |
| parenchyma. The gallbladder is normal. Biliaryducts are unremarkable. The spleen is      |
| unremarkable. The pancreas demonstratesnormal parenchyma and a normal pancreatic duct.   |
| Adrenal glands are normal. Theright kidney and visualized ureter are normal. The left    |
| kidney and visualizedureter are normal. Stomach is normal. Imaged small bowel and colon  |
| show no acutefindings. There is no significant abnormality in the portal veins,          |
| mesentericveins, or systemic veins. No enlarged lymph nodes are visualized within        |
| theomentum or retroperitoneum. There is no evidence for ascites or free air.Body wall    |
| soft tissue structures are normal. There is mild S-shaped curvature ofthe thoracolumbar  |
| spine. Mild to moderate spondylosis is present.IMPRESSION -No evidence for aortic        |
| dissection.A preliminary report was sent by [facility] with no                      |
| significantdiscrepancy.Dictated and Signed by: Tony Jacobs MD  Electronically signed:   |
| 2017 8:43 AM                                                                         |
|ducts are unremarkable. The spleen is unremarkable. The pancreas demonstrates             |
|normal parenchyma and a normal pancreatic duct. Adrenal glands are normal. The            |
|right kidney and visualized ureter are normal. The left kidney and visualized             |
|ureter are normal. Stomach is normal. Imaged small bowel and colon show no acute          |
|findings. There is no significant abnormality in the portal veins, mesenteric            |
|veins, or systemic veins. No enlarged lymph nodes are visualized within the               |
|omentum or retroperitoneum. There is no evidence for ascites or free air.                 |
 
|                                                                                          |
|Body wall soft tissue structures are normal. There is mild S-shaped curvature of          |
|the thoracolumbar spine. Mild to moderate spondylosis is present.                         |
|                                                                                          |
|IMPRESSION -                                                                              |
|No evidence for aortic dissection.                                                        |
|                                                                                          |
|A preliminary report was sent by [facility] with no significant                      |
|discrepancy.                                                                             |
|                                                                                          |
|Dictated and Signed by: Tony Jacobs MD                                                   |
| Electronically signed: 2017 8:43 AM                                                  |
+------------------------------------------------------------------------------------------+
 
 
 
+---------------+---------+--------------------+--------------+
| Performing    | Address | City/State/Zipcode | Phone Number |
| Organization  |         |                    |              |
+---------------+---------+--------------------+--------------+
|   [facility] |         |                    |              |
+---------------+---------+--------------------+--------------+
 CV CARDIAC PROCEDURE (2017  4:26 PM PST)
 
+----------+
| Specimen |
+----------+
|          |
+----------+
 
 
 
+------------------------------------------------------------------------+--------------+
| Narrative                                                              | Performed At |
+------------------------------------------------------------------------+--------------+
|   This result has an attachment that is not available.  Anastasia CASTILLO         |              |
| MD S[place]       2017 19:10Sera Weeks is a 71 y.o. female      |              |
| patient.1. Acquired hypothyroidism   2. NSTEMI (non-ST elevated        |              |
| myocardial infarction) (HCC)   3. Pure hypercholesterolemia    Past    |              |
| Medical History Diagnosis Date                                         |              |
|   Hyperlipidemia                                                       |              |
|   Hypothyroidism                                                       |              |
|   Incontinence                                                         |              |
|   Depression    Blood pressure 124/78, pulse 63, temperature 36.2   C  |              |
| (97.2   F), resp. rate 19, height 1.6 m (5' 3"), weight 74.3 kg (163   |              |
| lb 12.8 oz), SpO2 90 %. CV Cardiac ProcedureDate/Time: 2017        |              |
| 17:10Performed by: ANASTASIA BRASHER AAuthorized by: ANASTASIA BRASHER       |              |
| AConsent: Verbal consent obtained. Written consent obtained.Risks and  |              |
| benefits: risks, benefits and alternatives were discussedConsent given |              |
|  by: patientPatient understanding: patient states understanding of the |              |
|  procedure being performedPatient consent: the patient's understanding |              |
|  of the procedure matches consent givenProcedure consent: procedure    |              |
| consent matches procedure scheduledRelevant documents: relevant        |              |
| documents present and verifiedTest results: test results available and |              |
|  properly labeledSite marked: the operative site was markedImaging     |              |
| studies: imaging studies availableRequired items: required blood       |              |
| products, implants, devices, and special equipment availablePatient    |              |
| identity confirmed: verbally with patient and arm bandTime out:        |              |
| Immediately prior to procedure a "time out" was called to verify the   |              |
| correct patient, procedure, equipment, support staff and site/side     |              |
 
| marked as required.Preparation: Patient was prepped and draped in the  |              |
| usual sterile fashion.Local anesthesia used: yesLocal anesthetic:      |              |
| lidocaine 1% without epinephrinePatient sedated: yesSedation type:     |              |
| moderate (conscious) sedationSedatives: fentanyl and midazolamVitals:  |              |
| Vital signs were monitored during sedation.Patient tolerance: Patient  |              |
| tolerated the procedure well with no immediate complications [place]  |              |
| [facility] LEFT CARDIAC CATHETERIZATION REPORT  PATIENT NAME:      |              |
|   Sera Clifford OF BIRTH:    1945MEDICAL RECORD NUMBER:       |              |
|   [id number] OF PROCEDURE:    2017                            |              |
|                                                                        |              |
|                  PRIMARY CARE PROVIDER:   Chandra King [facility]   |              |
| :    Anastasia Brasher MD, [facility], [facility] PRE-PROCEDURE DIAGNOSIS:   |              |
|    NSTEMIPOST-PROCEDURE DIAGNOSIS:   same PROCEDURES PERFORMED:        |              |
|   1.   Left Heart Catheterization for pressures2.   Coronary           |              |
| Angiography3.   Aortic Root  DESCRIPTION OF PROCEDURE: Informed        |              |
| consent was obtained from the patient, and a time-out was performed to |              |
|  verify the patient's identification and planned procedure.   Please   |              |
| refer to the computer log entry form for precise details.   The        |              |
| patient's right radial artery was sterilely prepped.   Arterial access |              |
|  was achieved with   micropuncture kit.   Patient was then given       |              |
| intravenous heparin as well as intra-arterial nitroglycerin and        |              |
| Nicardipine through the sheath.   A 5 French JR4 and a 3 DRC did not   |              |
| fit the coronary.   A pullback across the valve was used to assess if  |              |
| there was a gradient across the aortic valve A   JL 3.5 did not fit    |              |
| the left system nor did a LBU 3 did not fit.   A JL3 sat close to the  |              |
| anomalous RCA and also opacified the Left.   A pig tail was used to    |              |
| perform an aortic root. .   The TR band occluder device was utilized   |              |
| to achieve successful hemostasis of the radial artery.   There were no |              |
|  immediate complications.       Estimated blood loss was: 10 cc.       |              |
|   Fluro Time: 8.9 Min    Total Reference Air Kerma: 443.55 mGy         |              |
|   Contrast: 105 mls of Omnipaque 350   FINDINGS:Hemodynamics:Left      |              |
| ventricular end-diastolic pressure (LVEDP) was 14 mm Hg.      There    |              |
| was no gradient across the aortic valve. Left Ventriculogram:   Not    |              |
| performed Left main coronary artery:   Normal Left anterior descending |              |
|  coronary artery:   Large vessel.   No significant disease.Circumflex  |              |
| coronary artery:   Dominant.   Gives off 2 marginal and the PDA.   No  |              |
| significant disease.   10% in the AV groove.Right coronary artery:     |              |
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
| cusp.   Minimal luminal irregularity. Aortic Root:   Enlarged No AI No |              |
|  obvious dissection Possible small aortic ulceration CONCLUSIONS:1.    |              |
| Dilated Aortic Root2. No AI3. Normal Left Main4. Normal LAD5. Dominant |              |
|  LCX.   10% luminal irregularity in AV groove.   Gives off the PDA6.   |              |
| Non dominant RCA.   Mild plaque.   Arises anomalously from the left    |              |
| cusp CLINICAL IMPRESSION AND RECOMMENDATIONS Will need CT of aorta to  |              |
| rule out dissectionEcho for LV function  Anastasia Brasher MD, FACC,      |              |
| FSCAIProvidence [facility]are Hospital of [facility] CountyDATE/TIME:   2017           |              |
| 17:111 17:11 Portions of this chart were created with Dragon    |              |
| voice recognition software. Occasional wrong-word or                   |              |
|                                                                        |              |
| sound-alike                                                            |              |
|   substitutions may have occurred due to the inherent limitations of   |              |
| voice recognition software. Please read the chart carefully and        |              |
| recognize, using context, where these substitutions have occurred      |              |
| Anastasia Brasher2017                                                |              |
|prepped.   Arterial access was achieved with   micropuncture kit.      |              |
|Patient was then given intravenous heparin as well as                   |              |
|intra-arterial nitroglycerin and Nicardipine through the sheath.        |              |
|A 5 French JR4 and a 3 DRC did not fit the coronary.   A pullback       |              |
|across the valve was used to assess if there was a gradient             |              |
|across the aortic valve A   JL 3.5 did not fit the left system nor      |              |
 
|did a LBU 3 did not fit.   A JL3 sat close to the anomalous RCA         |              |
|and also opacified the Left.   A pig tail was used to perform an        |              |
|aortic root. .                                                          |              |
|The TR band occluder device was utilized to achieve successful          |              |
|hemostasis of the radial artery.   There were no immediate              |              |
|complications.                                                         |              |
|                                                                        |              |
|   Estimated blood loss was: 10 cc.                                     |              |
|   Fluro Time: 8.9 Min                                                  |              |
|   Total Reference Air Kerma: 443.55 mGy                                |              |
|   Contrast: 105 mls of Omnipaque 350                                   |              |
|                                                                        |              |
|FINDINGS:                                                              |              |
|Hemodynamics:                                                          |              |
|Left ventricular end-diastolic pressure (LVEDP) was 14 mm Hg.           |              |
|There was no gradient across the aortic valve.                          |              |
|                                                                        |              |
|Left Ventriculogram:   Not performed                                    |              |
|Left main coronary artery:   Normal                                     |              |
|Left anterior descending coronary artery:   Large vessel.   No          |              |
|significant disease.                                                    |              |
|Circumflex coronary artery:   Dominant.   Gives off 2 marginal and      |              |
|the PDA.   No significant disease.   10% in the AV groove.              |              |
|Right coronary artery:   Small non dominant vessel anomalous            |              |
|origin from the left coronary cusp.   Minimal luminal                   |              |
|irregularity.                                                          |              |
|                                                                        |              |
|Aortic Root:                                                            |              |
|Enlarged                                                                |              |
|No AI                                                                   |              |
|No obvious dissection                                                   |              |
|Possible small aortic ulceration                                        |              |
|CONCLUSIONS:                                                           |              |
|1. Dilated Aortic Root                                                  |              |
|2. No AI                                                                |              |
|3. Normal Left Main                                                     |              |
|4. Normal LAD                                                           |              |
|5. Dominant LCX.   10% luminal irregularity in AV groove.   Gives       |              |
|off the PDA                                                             |              |
|6. Non dominant RCA.   Mild plaque.   Arises anomalously from the       |              |
|left cusp                                                               |              |
|                                                                        |              |
|CLINICAL IMPRESSION AND RECOMMENDATIONS                                 |              |
|Will need CT of aorta to rule out dissection                            |              |
|Echo for LV function                                                    |              |
|                                                                        |              |
|                                                                        |              |
|Anastasia Brasher MD, FAC, [facility]                                          |              |
|[facility]                                      |              |
|DATE/TIME:   2017 17:11                                             |              |
|2017 17:11                                                          |              |
|                                                                        |              |
|Portions of this chart were created with Dragon voice recognition       |              |
|software. Occasional wrong-word or    sound-alike    substitutions     |              |
|may have occurred due to the inherent limitations of voice              |              |
|recognition software. Please read the chart carefully and               |              |
|recognize, using context, where these substitutions have occurred       |              |
|                                                                        |              |
 
|                                                                        |              |
|Anastasia Brasher                                                         |              |
|2017                                                                |              |
|                                                                        |              |
+------------------------------------------------------------------------+--------------+
 CT Head w wo Contrast (2017  2:23 PM PST)
 
+----------+
| Specimen |
+----------+
|          |
+----------+
 
 
 
+------------------------------------------------------------------------+---------------+
| Narrative                                                              | Performed At  |
+------------------------------------------------------------------------+---------------+
|   UNENHANCED AND ENHANCED HEAD CT 2017 2:23 PM     CLINICAL        |   [facility] |
| HISTORY:   pain behind right eye           COMPARISON: None available  |               |
|     TECHNIQUE: Axial images are performed through the head both before |               |
|  and after the  uneventful intravenous administration of 80 cc         |               |
| Omnipaque 350 contrast.   Coronal  and sagittal reformations are also  |               |
| performed.      FINDINGS:   There is mild cerebral atrophy and mild,   |               |
| patchy hypoattenuation  involving the deep and periventricular         |               |
| cerebral white matter.   Gray-white  differentiation is maintained.    |               |
|   The ventricles, brainstem and cerebellum are  unremarkable.   There  |               |
| is no mass effect, abnormal enhancement, evidence of  intracranial     |               |
| hemorrhage or extra-axial fluid collection/mass.   Bilateral           |               |
| prosthetic ocular lenses are present.   The orbital contents are       |               |
| otherwise  symmetric and unremarkable, without visible mass lesion or  |               |
| proptosis.   There is  mild mucous membrane thickening within          |               |
| bilateral ethmoid air cells.   Imaged  paranasal sinuses are otherwise |               |
|  well aerated, along with the middle ear cavities  and imaged after    |               |
| air cells.   Asymmetric degeneration of the left greater than  right   |               |
| temporomandibular joints is noted.     There is calcified plaque       |               |
| within the cavernous segments of the internal carotid  arteries.       |               |
|   Major intracranial vessels and dural sinuses otherwise appear patent |               |
|   and grossly unremarkable.   No conclusive aneurysm is visible.       |               |
| IMPRESSION -     1.   NO VISIBLE MASS, ANEURYSM OR OTHER POTENTIAL     |               |
| ETIOLOGY FOR RETRO-ORBITAL  PAIN.     2.   MILD CEREBRAL ATROPHY AND   |               |
| PROBABLE CHRONIC, MICROVASCULAR ISCHEMIC CHANGE OF  THE CEREBRAL WHITE |               |
|  MATTER.     3.   MILD ETHMOID SINUS DISEASE.     4.   ASYMMETRIC      |               |
| DEGENERATION OF THE TEMPOROMANDIBULAR JOINTS.     Dictated and Signed  |               |
| by: Андрей Payton MD    Electronically signed: 2017 2:36 PM        |               |
+------------------------------------------------------------------------+---------------+
 
 
 
+------------------------------------------------------------------------------------------+
| Procedure Note                                                                           |
+------------------------------------------------------------------------------------------+
|   Ace, Rad Results In - 2017  2:39 PM PST  UNENHANCED AND ENHANCED HEAD CT         |
| 2017 2:23 PM CLINICAL HISTORY:  pain behind right eye     COMPARISON: None available |
|  TECHNIQUE: Axial images are performed through the head both before and after            |
| theuneventful intravenous administration of 80 cc Omnipaque 350 contrast.  Coronaland    |
| sagittal reformations are also performed.  FINDINGS:  There is mild cerebral atrophy and |
|  mild, patchy hypoattenuationinvolving the deep and periventricular cerebral white       |
| matter.  Gray-whitedifferentiation is maintained.  The ventricles, brainstem and         |
| cerebellum areunremarkable.  There is no mass effect, abnormal enhancement, evidence     |
 
| ofintracranial hemorrhage or extra-axial fluid collection/mass.  Bilateralprosthetic     |
| ocular lenses are present.  The orbital contents are otherwisesymmetric and              |
| unremarkable, without visible mass lesion or proptosis.  There ismild mucous membrane    |
| thickening within bilateral ethmoid air cells.  Imagedparanasal sinuses are otherwise    |
| well aerated, along with the middle ear cavitiesand imaged after air cells.  Asymmetric  |
| degeneration of the left greater thanright temporomandibular joints is noted.There is    |
| calcified plaque within the cavernous segments of the internal carotidarteries.  Major   |
| intracranial vessels and dural sinuses otherwise appear patentand grossly unremarkable.  |
|  No conclusive aneurysm is visible. IMPRESSION -  1.  NO VISIBLE MASS, ANEURYSM OR OTHER |
|  POTENTIAL ETIOLOGY FOR RETRO-ORBITALPAIN.2.  MILD CEREBRAL ATROPHY AND PROBABLE         |
| CHRONIC, MICROVASCULAR ISCHEMIC CHANGE OFTHE CEREBRAL WHITE MATTER.3.  MILD ETHMOID      |
| SINUS DISEASE.4.  ASYMMETRIC DEGENERATION OF THE TEMPOROMANDIBULAR JOINTS.Dictated and   |
| Signed by: Андрей Payton MD  Electronically signed: 2017 2:36 PM                      |
|                                                                                          |
|There is calcified plaque within the cavernous segments of the internal carotid           |
|arteries.  Major intracranial vessels and dural sinuses otherwise appear patent          |
|and grossly unremarkable.  No conclusive aneurysm is visible.                             |
|                                                                                          |
|IMPRESSION -                                                                              |
|1.  NO VISIBLE MASS, ANEURYSM OR OTHER POTENTIAL ETIOLOGY FOR RETRO-ORBITAL               |
|PAIN.                                                                                    |
|                                                                                          |
|2.  MILD CEREBRAL ATROPHY AND PROBABLE CHRONIC, MICROVASCULAR ISCHEMIC CHANGE OF          |
|THE CEREBRAL WHITE MATTER.                                                                |
|                                                                                          |
|3.  MILD ETHMOID SINUS DISEASE.                                                           |
|                                                                                          |
|4.  ASYMMETRIC DEGENERATION OF THE TEMPOROMANDIBULAR JOINTS.                              |
|                                                                                          |
|Dictated and Signed by: Андрей Payton MD                                                   |
| Electronically signed: 2017 2:36 PM                                                  |
+------------------------------------------------------------------------------------------+
 
 
 
+---------------+---------+--------------------+--------------+
| Performing    | Address | City/State[facility] | Phone Number |
| Organization  |         |                    |              |
+---------------+---------+--------------------+--------------+
|   [facility] |         |                    |              |
+---------------+---------+--------------------+--------------+
 ECG 12 lead (2017 12:50 PM PST)
 
+-------------+--------------------------+-----------+------------+--------------+
| Component   | Value                    | Ref Range | Performed  | Pathologist  |
|             |                          |           | At         | Signature    |
+-------------+--------------------------+-----------+------------+--------------+
| VENTRICULAR | 57                       | BPM       | [facility]  |              |
|  RATE EKG   |                          |           |            |              |
+-------------+--------------------------+-----------+------------+--------------+
| ATRIAL RATE | 57                       | BPM       | [facility]  |              |
+-------------+--------------------------+-----------+------------+--------------+
| P-R         | 146                      | ms        | [facility]  |              |
| INTERVAL    |                          |           |            |              |
+-------------+--------------------------+-----------+------------+--------------+
| QRS         | 78                       | ms        | [facility]  |              |
| DURATION    |                          |           |            |              |
+-------------+--------------------------+-----------+------------+--------------+
| Q-T         | 450                      | ms        | [facility]  |              |
| INTERVAL    |                          |           |            |              |
 
+-------------+--------------------------+-----------+------------+--------------+
| Q-T         | 438                      | ms        | [facility]  |              |
| INTERVAL    |                          |           |            |              |
| (CORRECTED) |                          |           |            |              |
+-------------+--------------------------+-----------+------------+--------------+
| P WAVE AXIS | 34                       | degrees   | [facility]  |              |
+-------------+--------------------------+-----------+------------+--------------+
| QRS AXIS    | -2                       | degrees   | [facility]  |              |
+-------------+--------------------------+-----------+------------+--------------+
| T AXIS      | 56                       | degrees   | [facility]  |              |
+-------------+--------------------------+-----------+------------+--------------+
| INTERPRETAT | Sinus bradycardiaLow     |           | [facility]  |              |
| ION TEXT    | voltage QRSNonspecific   |           |            |              |
|             | ST abnormalityBorderline |           |            |              |
|             |  ECGNo previous ECGs     |           |            |              |
|             | availableConfirmed by    |           |            |              |
|             | ANASTASIA BRASHER MD        |           |            |              |
|             | ([id number]) on 2017      |           |            |              |
|             | 1:32:01 PM               |           |            |              |
+-------------+--------------------------+-----------+------------+--------------+
 
 
 
+----------+
| Specimen |
+----------+
|          |
+----------+
 
 
 
+----------------------------------------------------------+--------------+
| Narrative                                                | Performed At |
+----------------------------------------------------------+--------------+
|   This result has an attachment that is not available.   |              |
+----------------------------------------------------------+--------------+
 
 
 
+--------------+---------+--------------------+--------------+
| Performing   | Address | City/State/Zipcode | Phone Number |
| Organization |         |                    |              |
+--------------+---------+--------------------+--------------+
|   [facility]  |         |                    |              |
+--------------+---------+--------------------+--------------+
 PTT (2017 12:44 PM PST)
 
+-----------+-------+-----------------+-------------+--------------+
| Component | Value | Ref Range       | Performed   | Pathologist  |
|           |       |                 | At          | Signature    |
+-----------+-------+-----------------+-------------+--------------+
| aPTT      | 32    | 22 - 36 seconds | [facility]  |              |
|           |       |                 | [facility]    |              |
|           |       |                 | [facility]     |              |
|           |       |                 | [facility] -    |              |
|           |       |                 | LABORATORY  |              |
+-----------+-------+-----------------+-------------+--------------+
 
 
 
 
+----------+
| Specimen |
+----------+
| Blood    |
+----------+
 
 
 
+----------------------+--------------------+--------------------+----------------+
| Performing           | Address            | City/State/Zipcode | Phone Number   |
| Organization         |                    |                    |                |
+----------------------+--------------------+--------------------+----------------+
|   [facility]     |   [address] |   [place]  |   [phone] |
| [facility]  |                    | [id number]              |                |
| - LABORATORY         |                    |                    |                |
+----------------------+--------------------+--------------------+----------------+
 Basic Metabolic Panel (2017 12:44 PM PST)
 
+-------------+--------------------------+-----------------+-------------+--------------+
| Component   | Value                    | Ref Range       | Performed   | Pathologist  |
|             |                          |                 | At          | Signature    |
+-------------+--------------------------+-----------------+-------------+--------------+
| Na          | 141                      | 136 - 149       | [facility]  |              |
|             |                          | mmol/L          | [facility]    |              |
|             |                          |                 | MEDICAL     |              |
|             |                          |                 | CENTER -    |              |
|             |                          |                 | LABORATORY  |              |
+-------------+--------------------------+-----------------+-------------+--------------+
| K           | 3.7                      | 3.5 - 5.1       | [facility]  |              |
|             |                          | mmol/L          | [facility]    |              |
|             |                          |                 | MEDICAL     |              |
|             |                          |                 | CENTER -    |              |
|             |                          |                 | LABORATORY  |              |
+-------------+--------------------------+-----------------+-------------+--------------+
| Cl          | 105                      | 98 - 109 mmol/L | [facility]  |              |
|             |                          |                 | [facility]    |              |
|             |                          |                 | MEDICAL     |              |
|             |                          |                 | CENTER -    |              |
|             |                          |                 | LABORATORY  |              |
+-------------+--------------------------+-----------------+-------------+--------------+
| CO2         | 30                       | 24 - 31 mmol/L  | [facility]  |              |
|             |                          |                 | [facility]    |              |
|             |                          |                 | [facility]     |              |
|             |                          |                 | C[facility] -    |              |
|             |                          |                 | LABORATORY  |              |
+-------------+--------------------------+-----------------+-------------+--------------+
| Anion Gap   | 6                        | 3 - 16 mmol/L   | [facility]  |              |
|             |                          |                 | [facility]    |              |
|             |                          |                 | MED[facility]     |              |
|             |                          |                 | CENTER -    |              |
|             |                          |                 | LABORATORY  |              |
+-------------+--------------------------+-----------------+-------------+--------------+
| Glucose     | 85                       | 70 - 109 mg/dL  | [facility]  |              |
|             |                          |                 | [facility]    |              |
|             |                          |                 | MEDICAL     |              |
|             |                          |                 | CENTER -    |              |
|             |                          |                 | LABORATORY  |              |
+-------------+--------------------------+-----------------+-------------+--------------+
| BUN         | 13                       | 7 - 18 mg/dL    | [facility]  |              |
|             |                          |                 | [facility]    |              |
 
|             |                          |                 | [facility]     |              |
|             |                          |                 | CENTER -    |              |
|             |                          |                 | LABORATORY  |              |
+-------------+--------------------------+-----------------+-------------+--------------+
| Creatinine  | 0.54 (L)                 | 0.60 - 1.30     | [facility]  |              |
|             |                          | mg/dL           | [facility]    |              |
|             |                          |                 | MED[facility]     |              |
|             |                          |                 | CENTER -    |              |
|             |                          |                 | LABORATORY  |              |
+-------------+--------------------------+-----------------+-------------+--------------+
| eGFR if not | >60Comment: GLOMERULAR   | >=60            | [facility]  |              |
|      | FILTRATION               | mL/min/1.73m2   | [facility]    |              |
| [employer]    | RATE,ESTIMATED           |                 | MEDICAL     |              |
|             |   mL/min/1.36s0[place]jta than |                 | CENTER -    |              |
|             |  60    Chronic kidney    |                 | LABORATORY  |              |
|             | disease,if found over a  |                 |             |              |
|             | 3-month period.Less than |                 |             |              |
|             |  15    Kidney failureFor |                 |             |              |
|             |                   |                 |             |              |
|             | Americans,multiply the   |                 |             |              |
|             | calculated GFR by 1.21.  |                 |             |              |
|             |                          |                 |             |              |
+-------------+--------------------------+-----------------+-------------+--------------+
| Calcium     | 9.1                      | 8.3 - 10.5      | [facility]  |              |
|             |                          | mg/dL           | [facility]    |              |
|             |                          |                 | MED[facility]     |              |
|             |                          |                 | CENTER -    |              |
|             |                          |                 | LABORATORY  |              |
+-------------+--------------------------+-----------------+-------------+--------------+
| BUN/Creatin | 24.1                     |                 | [facility]  |              |
| ine Ratio   |                          |                 | [facility]    |              |
|             |                          |                 | [facility]     |              |
|             |                          |                 | CENTER -    |              |
|             |                          |                 | LABORATORY  |              |
+-------------+--------------------------+-----------------+-------------+--------------+
 
 
 
+----------+
| Specimen |
+----------+
| Blood    |
+----------+
 
 
 
+----------------------+--------------------+--------------------+----------------+
| Performing           | Address            | City/State/Zipcode | Phone Number   |
| Organization         |                    |                    |                |
+----------------------+--------------------+--------------------+----------------+
|   [facility]     |   [address] |   [place]  |   [phone] |
| [facility]  |                    | [address]              |                |
| - LABORATORY         |                    |                    |                |
+----------------------+--------------------+--------------------+----------------+
 CBC no Differential (2017 12:44 PM PST)
 
+-------------+---------+-----------------+-------------+--------------+
| Component   | Value   | Ref Range       | Performed   | Pathologist  |
|             |         |                 | At          | Signature    |
+-------------+---------+-----------------+-------------+--------------+
 
| White Blood | 4.2     | 4.0 - 11.0 K/uL | [facility]  |              |
|  Cells      |         |                 |  [facility]    |              |
|             |         |                 | MEDICAL     |              |
|             |         |                 | CENTER -    |              |
|             |         |                 | LABORATORY  |              |
+-------------+---------+-----------------+-------------+--------------+
| Red Blood   | 4.33    | 3.70 - 5.20     | [facility]  |              |
| Cells       |         | M/uL            | [facility]    |              |
|             |         |                 | MEDICAL     |              |
|             |         |                 | CENTER -    |              |
|             |         |                 | LABORATORY  |              |
+-------------+---------+-----------------+-------------+--------------+
| Hemoglobin  | 13.9    | 11.5 - 16.0     | [facility]  |              |
|             |         | g/dL            | [facility]    |              |
|             |         |                 | MED[facility]     |              |
|             |         |                 | CENTER -    |              |
|             |         |                 | LABORATORY  |              |
+-------------+---------+-----------------+-------------+--------------+
| Hematocrit  | 41.2    | 34.0 - 47.0 %   | [facility]  |              |
|             |         |                 | [facility]    |              |
|             |         |                 | MED[facility]     |              |
|             |         |                 | CENTER -    |              |
|             |         |                 | LABORATORY  |              |
+-------------+---------+-----------------+-------------+--------------+
| MCV         | 95.0    | 83.0 - 101.0 fL | [facility]  |              |
|             |         |                 | [facility]    |              |
|             |         |                 | [facility]     |              |
|             |         |                 | CENTER -    |              |
|             |         |                 | LABORATORY  |              |
+-------------+---------+-----------------+-------------+--------------+
| MCH         | 32.2    | 28.0 - 35.0 pg  | [facility]  |              |
|             |         |                 | [facility]    |              |
|             |         |                 | [facility]     |              |
|             |         |                 | CENTER -    |              |
|             |         |                 | LABORATORY  |              |
+-------------+---------+-----------------+-------------+--------------+
| MCHC        | 33.9    | 32.0 - 36.0     | [facility]  |              |
|             |         | g/dL            | [facility]    |              |
|             |         |                 | MEDICAL     |              |
|             |         |                 | CENTER -    |              |
|             |         |                 | LABORATORY  |              |
+-------------+---------+-----------------+-------------+--------------+
| RDW-CV      | 12.8    | <15.0 %         | [facility]  |              |
|             |         |                 | [facility]    |              |
|             |         |                 | MEDICAL     |              |
|             |         |                 | CENTER -    |              |
|             |         |                 | LABORATORY  |              |
+-------------+---------+-----------------+-------------+--------------+
| Platelet    | 129 (L) | 140 - 440 K/uL  | [facility]  |              |
| Count       |         |                 | [facility]    |              |
|             |         |                 | MEDICAL     |              |
|             |         |                 | CENTER -    |              |
|             |         |                 | LABORATORY  |              |
+-------------+---------+-----------------+-------------+--------------+
| MPV         | 10.1    | fL              | [facility]  |              |
|             |         |                 | [facility]    |              |
|             |         |                 | MEDICAL     |              |
|             |         |                 | CENTER -    |              |
|             |         |                 | LABORATORY  |              |
+-------------+---------+-----------------+-------------+--------------+
 
 
 
 
+----------+
| Specimen |
+----------+
| Blood    |
+----------+
 
 
 
+----------------------+--------------------+--------------------+----------------+
| Performing           | Address            | City/State/Zipcode | Phone Number   |
| Organization         |                    |                    |                |
+----------------------+--------------------+--------------------+----------------+
|   [facility]     |   [address] |   [place]  |   [phone] |
| [facility]  |                    | [address]              |                |
| - LABORATORY         |                    |                    |                |
+----------------------+--------------------+--------------------+----------------+
 Troponin I (2017 12:44 PM PST)
 
+------------+--------------------------+-------------+-------------+--------------+
| Component  | Value                    | Ref Range   | Performed   | Pathologist  |
|            |                          |             | At          | Signature    |
+------------+--------------------------+-------------+-------------+--------------+
| Troponin I | 2.01 ()Comment:        | <0.06 ng/mL | [facility]  |              |
|            | Critical Result called   |             |  [facility]    |              |
|            | to and read back by      |             | MEDICAL     |              |
|            | CHAS SHRESTHA on   |             | CENTER -    |              |
|            | 2017 at 13:16 by     |             | LABORATORY  |              |
|            | Joe Tarango.          |             |             |              |
|            | Reference                |             |             |              |
|            | Ranges:0.00-0.06 =       |             |             |              |
|            | NORMAL>0.06       =      |             |             |              |
|            | SUSPICIOUS FOR           |             |             |              |
|            | MYOCARDIAL DAMAGE NOTE:  |             |             |              |
|            | Values greater than 0.50 |             |             |              |
|            |  ng/mL have been shown   |             |             |              |
|            | to be strongly           |             |             |              |
|            | associated with acute    |             |             |              |
|            | myocardial infarction.   |             |             |              |
|            | The [employer] College of  |             |             |              |
|            | Cardiology (ACC)         |             |             |              |
|            | recommends a decision    |             |             |              |
|            | limit of 0.06 ng/mL for  |             |             |              |
|            | this assay.   Results    |             |             |              |
|            | greater than 0.06 can    |             |             |              |
|            | reflect a pre-infarct    |             |             |              |
|            | acute coronary syndrome, |             |             |              |
|            |  but can also reflect    |             |             |              |
|            | myocardial necrosis or   |             |             |              |
|            | injury that is not due   |             |             |              |
|            | to coronary artery       |             |             |              |
|            | disease.   Some of these |             |             |              |
|            |  causes are sepsis,      |             |             |              |
|            | hypocolemia, atrial      |             |             |              |
|            | fibrillation, heart      |             |             |              |
|            | failure, pulmonary       |             |             |              |
|            | embolism, myocarditis,   |             |             |              |
|            | myocardial contusion,    |             |             |              |
 
|            | and renal failure.   The |             |             |              |
|            |  diagnosis of myocardial |             |             |              |
|            |  infarction should be    |             |             |              |
|            | based on a combination   |             |             |              |
|            | of the patient's         |             |             |              |
|            | clinical presentation    |             |             |              |
|            | and the clinical         |             |             |              |
|            | laboratory test results  |             |             |              |
|            | (especially serial       |             |             |              |
|            | troponin levels).        |             |             |              |
+------------+--------------------------+-------------+-------------+--------------+
 
 
 
+----------+
| Specimen |
+----------+
| Blood    |
+----------+
 
 
 
+----------------------+--------------------+--------------------+----------------+
| Performing           | Address            | City/State/Zipcode | Phone Number   |
| Organization         |                    |                    |                |
+----------------------+--------------------+--------------------+----------------+
|   [facility]     |   [address] |   [place]  |   [phone] |
| [facility]  |                    | [address]              |                |
| - LABORATORY         |                    |                    |                |
+----------------------+--------------------+--------------------+----------------+
 XR Chest PA or LUIS (2017  4:55 AM PST)
 
+----------+
| Specimen |
+----------+
|          |
+----------+
 
 
 
+--------------------------------------------------------------------+---------------+
| Narrative                                                          | Performed At  |
+--------------------------------------------------------------------+---------------+
|   External films for comparison only - no result from [facility].  |   [facility] |
+--------------------------------------------------------------------+---------------+
 
 
 
+---------------+---------+--------------------+--------------+
| Performing    | Address | City/State/Zipcode | Phone Number |
| Organization  |         |                    |              |
+---------------+---------+--------------------+--------------+
|   [facility] |         |                    |              |
+---------------+---------+--------------------+--------------+
 ECG - EXTERNAL SCAN (2017 12:00 AM PST)
 
+------------------------------------------------------------------------+--------------+
| Narrative                                                              | Performed At |
+------------------------------------------------------------------------+--------------+
|   This result has an attachment that is not available.  Ordered by an  |              |
 
| unspecified provider.                                                  |              |
+------------------------------------------------------------------------+--------------+
 documented in this encounter
 
 Visit Diagnoses
 
 
+-------------------------------------------------------------------------------------+
| Diagnosis                                                                           |
+-------------------------------------------------------------------------------------+
|   NSTEMI (non-ST elevated myocardial infarction) (HCC) - Primary  Acute myocardial  |
| infarction, subendocardial infarction, episode of care unspecified                  |
+-------------------------------------------------------------------------------------+
|   Acquired hypothyroidism  Unspecified hypothyroidism                               |
+-------------------------------------------------------------------------------------+
|   Pure hypercholesterolemia                                                         |
+-------------------------------------------------------------------------------------+
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
+-------------------------------------------------------------------------------------+
|   Depression  Depressive disorder, not elsewhere classified                         |
+-------------------------------------------------------------------------------------+
 documented in this encounter
 
 Administered Medications
 
 
+-----------------------------------+--------+----------+--------+------+------+
| Medication Order                  | MAR    | Action   | Dose   | Rate | Site |
|                                   | Action | Date     |        |      |      |
+-----------------------------------+--------+----------+--------+------+------+
|   acetaminophen (TYLENOL) tablet  | Given  | 20 | 650 mg |      |      |
| 650 mg  650 mg, Oral, EVERY 6     |        | 17 11:57 |        |      |      |
| HOURS PRN, Pain, Fever, Starting  |        |  PM PST  |        |      |      |
| Sun 17 at 1751, Post-op/Phase |        |          |        |      |      |
|  II                               |        |          |        |      |      |
+-----------------------------------+--------+----------+--------+------+------+
 
 
 
+---+---+
|   |   |
+---+---+
 
 
 
+-----------------------------------+-------+----------+-------+---+---+
|   aspirin EC tablet 81 mg  81 mg, | Given | 20 | 81 mg |   |   |
|  Oral, DAILY, First dose on Sun   |       | 17  8:44 |       |   |   |
| 17 at 1315, Do not cut or     |       |  AM PST  |       |   |   |
| crush.,                           |       |          |       |   |   |
+-----------------------------------+-------+----------+-------+---+---+
 
 
 
+-------+----------+-------+---+---+
| Given | 20 | 81 mg |   |   |
|       | 17  8:42 |       |   |   |
|       |  AM PST  |       |   |   |
+-------+----------+-------+---+---+
 
 
 
 
+---+---+
|   |   |
+---+---+
 
 
 
+----------------------------------+-------+----------+-------+---+---+
|   atorvaSTATin (LIPITOR) tablet  | Given | 20 | 40 mg |   |   |
| 40 mg  40 mg, Oral, NIGHTLY,     |       | 17  9:11 |       |   |   |
| First dose on Sun 17 at 2100 |       |  PM PST  |       |   |   |
+----------------------------------+-------+----------+-------+---+---+
 
 
 
+-------+----------+-------+---+---+
| Given | 20 | 40 mg |   |   |
|       | 17  8:54 |       |   |   |
|       |  PM PST  |       |   |   |
+-------+----------+-------+---+---+
 
 
 
+---+---+
|   |   |
+---+---+
 
 
 
+-----------------------------------+-------+----------+-------+---+---+
|   FLUoxetine (PROzac) capsule 20  | Given | 20 | 20 mg |   |   |
| mg  20 mg, Oral, DAILY, First     |       | 17  9:11 |       |   |   |
| dose on Sun 17 at 1315        |       |  PM PST  |       |   |   |
+-----------------------------------+-------+----------+-------+---+---+
 
 
 
+-------+----------+-------+---+---+
| Given | 20 | 20 mg |   |   |
|       | 17  5:27 |       |   |   |
|       |  PM PST  |       |   |   |
+-------+----------+-------+---+---+
 
 
 
+---+---+
|   |   |
+---+---+
 
 
 
+-----------------------------------+-------+----------+--------+---+---+
|   iohexol (OMNIPAQUE 350) 350     | Given | 20 | 80 mLs |   |   |
| mg/mL injection 80 mL  80 mL,     |       | 17  2:24 |        |   |   |
| Intravenous, ONCE PRN, Other,     |       |  PM PST  |        |   |   |
| Starting Sun 17 at 1423, For  |       |          |        |   |   |
| 1 dose, Cat Scanner               |       |          |        |   |   |
+-----------------------------------+-------+----------+--------+---+---+
 
 
 
 
+---+---+
|   |   |
+---+---+
 
 
 
+-----------------------------------+-------+----------+--------+---+---+
|   iohexol (OMNIPAQUE 350) 350     | Given | 20 | 85 mLs |   |   |
| mg/mL injection 85 mL  85 mL,     |       | 17  5:07 |        |   |   |
| Intravenous, ONCE PRN, Other,     |       |  PM PST  |        |   |   |
| Starting Sun 17 at 1709, For  |       |          |        |   |   |
| 1 dose, Cat Scanner               |       |          |        |   |   |
+-----------------------------------+-------+----------+--------+---+---+
 
 
 
+---+---+
|   |   |
+---+---+
 
 
 
+----------------------------------+-------+----------+---------+---+---+
|   levothyroxine (SYNTHROID,      | Given | 20 | 100 mcg |   |   |
| LEVOTHROID) tablet 100 mcg  100  |       | 17  6:30 |         |   |   |
| mcg, Oral, DAILY BEFORE          |       |  AM PST  |         |   |   |
| BREAKFAST, First dose on Sun     |       |          |         |   |   |
| 17 at 1315, Give before      |       |          |         |   |   |
| breakfast.,                      |       |          |         |   |   |
+----------------------------------+-------+----------+---------+---+---+
 
 
 
+-------+----------+---------+---+---+
| Given | 20 | 100 mcg |   |   |
|       | 17  6:32 |         |   |   |
|       |  AM PST  |         |   |   |
+-------+----------+---------+---+---+
| Given | 20 | 100 mcg |   |   |
|       | 17  5:28 |         |   |   |
|       |  PM PST  |         |   |   |
+-------+----------+---------+---+---+
 
 
 
+---+---+
|   |   |
+---+---+
 
 
 
+----------------------------------+---------+----------+-----+----------+---+
|   magnesium sulfate 2 g/50 mL    | New Bag | 20 | 2 g | 25 mL/hr |   |
| IVPB 2 g  2 g, Intravenous,      |         | 17  8:42 |     |          |   |
| Administer over 120 Minutes,     |         |  AM PST  |     |          |   |
| ONCE, Mon 17 at 0830, For 1  |         |          |     |          |   |
| dose, Maximum recommended        |         |          |     |          |   |
| infusion rate = 1 gram/hour.,    |         |          |     |          |   |
 
+----------------------------------+---------+----------+-----+----------+---+
 
 
 
+---+---+
|   |   |
+---+---+
 
 
 
+-----------------------------------+-------+----------+-------+---+---+
|   metoprolol tartrate (LOPRESSOR) | Given | 20 | 25 mg |   |   |
|  tablet 25 mg  25 mg, Oral, 2     |       | 17  5:28 |       |   |   |
| TIMES DAILY, First dose on Sun    |       |  PM PST  |       |   |   |
| 17 at 1730                    |       |          |       |   |   |
+-----------------------------------+-------+----------+-------+---+---+
 
 
 
+---+---+
|   |   |
+---+---+
 
 
 
+-----------------------------------+-------+----------+-------+---+---+
|   metoprolol tartrate (LOPRESSOR) | Given | 20 | 25 mg |   |   |
|  tablet 25 mg  25 mg, Oral,      |       | 17 10:16 |       |   |   |
| TIMES DAILY, First dose (after    |       |  AM PST  |       |   |   |
| last modification) on Tue 1/3/17  |       |          |       |   |   |
| at 0900                           |       |          |       |   |   |
+-----------------------------------+-------+----------+-------+---+---+
 
 
 
+---+---+
|   |   |
+---+---+
 
 
 
+-----------------------------------+-------+----------+-------+---+---+
|   metoprolol tartrate (LOPRESSOR) | Given | 20 | 50 mg |   |   |
|  tablet 50 mg  50 mg, Oral,      |       | 17  9:11 |       |   |   |
| TIMES DAILY, First dose (after    |       |  PM PST  |       |   |   |
| last modification) on 17  |       |          |       |   |   |
| at 0830                           |       |          |       |   |   |
+-----------------------------------+-------+----------+-------+---+---+
 
 
 
+-------+----------+-------+---+---+
| Given | 20 | 50 mg |   |   |
|       | 17  8:42 |       |   |   |
|       |  AM PST  |       |   |   |
+-------+----------+-------+---+---+
 
 
 
+---+---+
 
|   |   |
+---+---+
 
 
 
+----------------------------------+-------+----------+--------+---+---+
|   potassium chloride (K-DUR) ER  | Given | 20 | 20 mEq |   |   |
| tablet 20 mEq  20 mEq, Oral,     |       | 17  6:32 |        |   |   |
| ONCE, Mon 17 at 0615, For 1  |       |  AM PST  |        |   |   |
| dose                             |       |          |        |   |   |
+----------------------------------+-------+----------+--------+---+---+
 
 
 
+---+---+
|   |   |
+---+---+
 
 
 
+-----------------------------------+------+----------+--------+-------+---+
|   sodium chloride 0.9% (NS) bolus | Push | 20 | 50 mLs | 3000  |   |
|  50 mL  50 mL, Intravenous,       |      | 17  5:07 |        | mL/hr |   |
| Administer over 1 Minutes, ONCE   |      |  PM PST  |        |       |   |
| PRN, for contrast study, Starting |      |          |        |       |   |
|  Sun 1/1/17 at 1708, For 1 dose,  |      |          |        |       |   |
| May infuse at a different rate    |      |          |        |       |   |
| per protocol., Cat Scanner        |      |          |        |       |   |
+-----------------------------------+------+----------+--------+-------+---+
 
 
 
+---+---+
|   |   |
+---+---+
 
 
 
+-----------------------------------+---------+----------+---+-------+---+
|   sodium chloride 0.9% (NS)       | New Bag | 20 |   | 125   |   |
| infusion  at 125 mL/hr,           |         | 17 12:49 |   | mL/hr |   |
| Intravenous, CONTINUOUS, Starting |         |  AM PST  |   |       |   |
|  Sun 17 at 1345               |         |          |   |       |   |
+-----------------------------------+---------+----------+---+-------+---+
 
 
 
+---------+----------+---+-------+---+
| New Bag | 20 |   | 125   |   |
|         | 17  1:25 |   | mL/hr |   |
|         |  PM PST  |   |       |   |
+---------+----------+---+-------+---+
 
 
 
+---+---+
|   |   |
+---+---+
 documented in this encounter

## 2020-07-23 NOTE — XMS
Encounter Summary
  Created on: 2020
 
 Parrish Weeksgabe Montero
 External Reference #: [id number]
 : [birth year]
 Sex: Female
 
 Demographics
 
 
+-----------------------+----------------------+
| Address               | [address]         |
|                       | [place]  [address] |
+-----------------------+----------------------+
| Home Phone            | [phone]      |
+-----------------------+----------------------+
| Preferred Language    | Unknown              |
+-----------------------+----------------------+
| Marital Status        |               |
+-----------------------+----------------------+
| Voodoo Affiliation | Unknown              |
+-----------------------+----------------------+
| Race                  | Unknown              |
+-----------------------+----------------------+
| Ethnic Group          | Unknown              |
+-----------------------+----------------------+
 
 
 Author
 
 
+--------------+--------------------------------------------+
| Author       | [facility] [place]  |
|              | and [facility]                                |
+--------------+--------------------------------------------+
| Organization | [facility] [place]  |
|              | and [facility]                                |
+--------------+--------------------------------------------+
| Address      | Unknown                                    |
+--------------+--------------------------------------------+
| Phone        | Unavailable                                |
+--------------+--------------------------------------------+
 
 
 
 Support
 
 
+-------------------+--------------+---------+-----------------+
| Name              | Relationship | Address | Phone           |
+-------------------+--------------+---------+-----------------+
| Melissa Lau | ECON         | Unknown | [phone] |
+-------------------+--------------+---------+-----------------+
| Mike Lau   | ECON         | Unknown | [phone] |
+-------------------+--------------+---------+-----------------+
| Damaris Coyle     | ECON         | Unknown | [phone] |
+-------------------+--------------+---------+-----------------+
 
 
 
 
 Care Team Providers
 
 
+-----------------------+------+-----------------+
| Care Team Member Name | Role | Phone           |
+-----------------------+------+-----------------+
| Chandra King MD | PCP  | [phone] |
+-----------------------+------+-----------------+
 
 
 
 Reason for Referral
 Diagnostic/Screening (Routine)
 
+--------+--------+-----------+--------------+--------------+---------------+
| Status | Reason | Specialty | Diagnoses /  | Referred By  | Referred To   |
|        |        |           | Procedures   | Contact      | Contact       |
+--------+--------+-----------+--------------+--------------+---------------+
| Closed |        | Radiology |   Diagnoses  |   Suki,  |   [facility]    |
|        |        |           |  Dilated     | Anastasia CASTILLO MD   | [address]  |
|        |        |           | aortic root  | [address] |  [place], |
|        |        |           | (HCC)  Heart |  ST  [place]   |  [place]           |
|        |        |           |  murmur      | [place]    | [address]    |
|        |        |           | Procedures   | [id number]        | Phone:        |
|        |        |           | ECHO         | Phone:       | [phone]  |
|        |        |           | Complete  [place] | [phone] |  Fax:         |
|        |        |           |  ECHO HEART  |   Fax:       | [phone]  |
|        |        |           | XTHORACIC,CO | [phone] |               |
|        |        |           | MPLETE W     |              |               |
|        |        |           | DOPPLER  [place]  |              |               |
|        |        |           | ECHO HEART   |              |               |
|        |        |           | XTHORACIC,CO |              |               |
|        |        |           | MPLETE, W/O  |              |               |
|        |        |           | DOPPLER      |              |               |
+--------+--------+-----------+--------------+--------------+---------------+
 
 
 
 
 Reason for Visit
 
 
+--------------------+----------+
| Reason             | Comments |
+--------------------+----------+
| Hospital Follow-up |          |
+--------------------+----------+
 Evaluate & Treat (Routine)
 
+--------+--------+------------+--------------+--------------+---------------+
| Status | Reason | Specialty  | Diagnoses /  | Referred By  | Referred To   |
|        |        |            | Procedures   | Contact      | Contact       |
+--------+--------+------------+--------------+--------------+---------------+
| Closed |        | Cardiology |   Diagnoses  |   Christine,    |   Suki,   |
|        |        |            |  Non-ST      | Chandra Siu,  | Anastasia CASTILLO MD    |
|        |        |            | elevation    | MD  [address]     | [address]  |
|        |        |            | (NSTEMI)     | [place]    | ST  [place]     |
 
|        |        |            | myocardial   | [address]        | [place]     |
|        |        |            | infarction   | [place],   | [address]  Phone: |
|        |        |            | (HCC)        | [place]           |  [phone] |
|        |        |            | Procedures   | [address]   |   Fax:        |
|        |        |            | NP IN CLINIC | Phone:       | [phone]  |
|        |        |            |  - HOSP FUP  | [phone] |               |
|        |        |            |              |   Fax:       |               |
|        |        |            |              | [phone] |               |
+--------+--------+------------+--------------+--------------+---------------+
 
 
 
 
 Encounter Details
 
 
+--------+---------+----------------------+----------------------+----------------------+
| Date   | Type    | Department           | Care Team            | Description          |
+--------+---------+----------------------+----------------------+----------------------+
| / | Office  |   [facility]          |   Anastasia Cohn,  | Dilated aortic root  |
| 2017   | Visit   | CARDIOLOGY  [address]    | MD  [address]  | ([facility]) (Primary Dx);  |
|        |         | [address]  [place], |  [place]     | Heart murmur; [place]   |
|        |         |  [place] [address]       | [id number] [phone]  | hypercholesterolemia |
|        |         | [phone]         |  [phone] (Fax)  | ; NSTEMI (non-ST     |
|        |         |                      |                      | elevated myocardial  |
|        |         |                      |                      | infarction) ([facility]);   |
|        |         |                      |                      | Anomalous right      |
|        |         |                      |                      | coronary artery;     |
|        |         |                      |                      | Rash                 |
+--------+---------+----------------------+----------------------+----------------------+
 
 
 
 Social History
 
 
+---------------+-------+-----------+--------+---------------------+
| Tobacco Use   | Types | Packs/Day | Years  | Date                |
|               |       |           | Used   |                     |
+---------------+-------+-----------+--------+---------------------+
| Former Smoker |       |           |        | Started: 1992 |
+---------------+-------+-----------+--------+---------------------+
 
 
 
+-------------+-------------+---------+----------+
| Alcohol Use | Drinks/Week | oz/Week | Comments |
+-------------+-------------+---------+----------+
| No          |             |         |          |
+-------------+-------------+---------+----------+
 
 
 
+------------------+---------------+
| Sex Assigned at  | Date Recorded |
| Birth            |               |
+------------------+---------------+
| Not on file      |               |
+------------------+---------------+
 documented as of this encounter
 
 
 Last Filed Vital Signs
 
 
+-------------------+------------------+----------------------+----------+
| Vital Sign        | Reading          | Time Taken           | Comments |
+-------------------+------------------+----------------------+----------+
| Blood Pressure    | 120/74           | 2017  1:03 PM  |          |
|                   |                  | PST                  |          |
+-------------------+------------------+----------------------+----------+
| Pulse             | 62               | 2017  1:03 PM  |          |
|                   |                  | PST                  |          |
+-------------------+------------------+----------------------+----------+
| Temperature       | -                | -                    |          |
+-------------------+------------------+----------------------+----------+
| Respiratory Rate  | 16               | 2017  1:03 PM  |          |
|                   |                  | PST                  |          |
+-------------------+------------------+----------------------+----------+
| Oxygen Saturation | -                | -                    |          |
+-------------------+------------------+----------------------+----------+
| Inhaled Oxygen    | -                | -                    |          |
| Concentration     |                  |                      |          |
+-------------------+------------------+----------------------+----------+
| Weight            | 77.6 kg (171 lb) | 2017  1:03 PM  |          |
|                   |                  | PST                  |          |
+-------------------+------------------+----------------------+----------+
| Height            | 160 cm (5' 3")   | 2017  1:03 PM  |          |
|                   |                  | PST                  |          |
+-------------------+------------------+----------------------+----------+
| Body Mass Index   | 30.29            | 2017  1:03 PM  |          |
|                   |                  | PST                  |          |
+-------------------+------------------+----------------------+----------+
 documented in this encounter
 
 Progress Notes
 Anastasia Cohn MD - 2017  2:20 PM PST Physical exam:
 /74 mmHg | Pulse 62 | Resp 16 | Ht 1.6 m (5' 3") | Wt 77.565 kg (171 lb) | BMI 30.30 
kg/m2
 Physical Exam 
 Constitutional: She is oriented to person, place, and time. She appears well-developed and 
well-nourished. 
 HENT: 
 Head: Normocephalic and atraumatic. 
 Eyes: Conjunctivae are normal. No scleral icterus. 
 Neck: Normal range of motion. Neck supple. No JVD present. 
 Cardiovascular: Normal rate and regular rhythm.  
 Murmur heard.
 Pulmonary/Chest: Effort normal and breath sounds normal. 
 Abdominal: Soft. Bowel sounds are normal. There is no tenderness. 
 Neurological: She is alert and oriented to person, place, and time. 
 Skin: Skin is warm and dry. Rash noted. 
 erythema and some raised areas on the anterior chest above the level of the bra
 Murmur :  2/6 FRAN upper right 
 Electronically signed by Anastasia Cohn MD at 2017  2:24 PM PSTAnastasia Cohn MD
 - 2017  1:31 PM PSTFormatting of this note might be different from the original.
   
 
 PATIENT NAME:  Sera Weeks  
 : 1945:         AGE: 71 y.o.
  PRIMARY CARE:
 
 Chandra King MD 
 
 OUTPATIENT FOLLOW UP VISIT
 
 Date of Service: 2017 
 
 HISTORY OF PRESENT ILLNESS:
 Sera Weeks is a 71 y.o. female with a history of NSTEMI, dilated aortic root and poss
ible small aortic ulcer.  She is being seen today for follow up.
 She has not had any further chest pain.  She is doing some walking.  
 She has no shortness of breath.  She is not having any leg edema.  She does have occasional
 edema at the end of the day.
 
 She was last seen by her primary on 1/10/2017.  He changed her to Norvasc.  She had never t
aken that before.  She has now developed a rash on her chest.  There is erythema and some pu
nctate areas. She has no other areas.  None on back or abdomen.  It is pruritic.  It is bett
er with cortisone 10 - gradually getting worse. 
 
 MEDICAL, SURGICAL, AND PERSONAL HISTORY
 Past Medical, Surgical, Family, and Social History are reviewed in EPIC.
 
 CURRENT PROBLEMS
 Patient Active Problem List 
 Diagnosis 
   Pure hypercholesterolemia 
   NSTEMI (non-ST elevated myocardial infarction)  
   Acquired hypothyroidism 
   Depression 
   Anomalous right coronary artery 
 
 CURRENT MEDICATIONS
 Current Outpatient Prescriptions 
 Medication Sig Dispense Refill 
   amlodipine (NORVASC) 5 mg tablet Take 5 mg by mouth Daily.   
   aspirin 81 mg EC tablet Take 81 mg by mouth Daily.   
       
   cholecalciferol (VITAMIN D-3) 1000 UNITS TABS Take 1,000 Units by mouth Daily.   
   FLUoxetine (PROZAC) 20 mg capsule Take 20 mg by mouth Daily.   
   levothyroxine (SYNTHROID, LEVOTHROID) 100 mcg tablet Take 100 mcg by mouth every mornin
g (before breakfast).   
   Multiple Vitamins-Minerals (ADULT MULTIVITAMIN WITH MINERALS/IRON) TABS Take 1 tablet b
y mouth Daily.   
   nitroglycerin (NITROSTAT) 0.4 mg SL tablet Place 1 tablet under the tongue every 5 chioma
bryson as needed for Chest pain. 25 tablet 1 
   simvastatin (ZOCOR) 40 mg tablet Take 40 mg by mouth nightly.   
   tolterodine (DETROL LA) 4 MG 24 hr capsule Take 4 mg by mouth Daily.   
   traMADol (ULTRAM) 50 mg tablet Take 50 mg by mouth every 6 hours as needed for Pain.   
   traZODone (DESYREL) 50 mg tablet Take 50 mg by mouth nightly.   
       
 
  
  
 
 ALLERGIES
 Allergies 
 Allergen Reactions 
   Codeine Itching 
 
 ROS
 Some palpitations - no sustained tachycardia
 
 OBJECTIVE:  
 
 PHYSICAL EXAM
 /74 mmHg | Pulse 62 | Resp 16 | Ht 1.6 m (5' 3") | Wt 77.565 kg (171 lb) | BMI 30.30 
kg/m2
 .  
 
 LAB RESULTS reviewed during visit today primarily from [facility]: 
 LIPID
 Lab Results 
 Component Value Date 
  CHOL 149* 2017 
  TRIG 122 2017 
  HDL 44 2017 
  LDL 81 2017 
  CHOLHDL 3.4 2017 
 
 CHEMISTRY
 Lab Results 
 Component Value Date 
  GLU 95 2017 
   2017 
  K 3.8 2017 
   2017 
  CO2 29 2017 
  CALCIUM 8.8 2017 
  ALKPHOS 41 2017 
  AST 30 2017 
  ALT 16 2017 
  BILITOT 0.6 2017 
  CREA 0.72 2017 
  BUN 10 2017 
 
 HEMATOLOGY
 Lab Results 
 Component Value Date 
  WBC 4.4 2017 
  HGB 12.5 2017 
  HCT 36.4 2017 
  * 2017 
  
 No new results
 
 I reviewed records from visit with Dr King
 
 ASSESSMENT:  
 Rash - etiology unclear.  Not generalized, it could still be a drug eruption.  
 Recent MI no obstructive coronary disease ([facility])
 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
blems this late in life
 This could be spasm, however, that is unusual later in life.
 PLAN:  
 Trial of medication for rash:
 cetirizine (ZYRTEC) 10 mg tablet Take 1 tablet by mouth Daily. As needed for rash 
 
 triamcinolone (KENALOG) 0.1% cream Small amount to rash twice a day 
 If rash is not improving in 48 hours make an apt to see Dr King next week.  This could be
 a reaction to the Amlodipine- if that is the case I would switch back to the Metoprolol.  O
ther options would be Verapamil I would try 120 mg of long acting a day and warn about const
ipation.
 
 
 Follow up with me in 6 months with an echo to check the aortic root.
 Let me know if she has to take NTG
 Call if any questions 
 
 Electronically signed by: Anastasia Cohn MD [facility] 2017 
 
 Portions of this chart may have been created with Dragon voice recognition software. Occasi
onal wrong-word or   sound-alike  substitutions may have occurred due to the inherent martell
itations of voice recognition software. Please read the chart carefully and recognize, using
 context, where these substitutions have occurred. Electronically signed by Anastasia Cohn MD at 2017  2:07 PM PSTdocumented in this encounter
 
 Plan of Treatment
 Not on filedocumented as of this encounter
 
 Results
 ECHO Complete (08/10/2017 10:42 AM PDT)
 
+-------------+-------+-----------+-------------+--------------+
| Component   | Value | Ref Range | Performed   | Pathologist  |
|             |       |           | At          | Signature    |
+-------------+-------+-----------+-------------+--------------+
| LVEF-TTE    | [address]    |           | [facility]  |              |
| [facility] |       |           | [facility]    |              |
| [facility] ECHO     |       |           | [facility]     |              |
|             |       |           | [facility] -    |              |
|             |       |           | IMAGING     |              |
+-------------+-------+-----------+-------------+--------------+
 
 
 
+----------+
| Specimen |
+----------+
|          |
+----------+
 
 
 
+----------------------------------------------------------------------------------------+--
---------------+
| Narrative                                                                              | P
erformed At    |
+----------------------------------------------------------------------------------------+--
---------------+
|   This result has an attachment that is not available.  Transthoracic                  |  
 [facility]    |
| Echocardiography Report (TTE)  Demographics  Patient Name    YOU MCDANIELS        |
| SERA      Room Number                        GISSEL  Patient Number                     | M
[facility]  |
| [id number]       Date of Study               08/10/2017  Visit Number                 | -
 IMAGING       |
|     [id number]  Accession         [id number]         Referring                        |  
               |
| Physician      ANASTASIA COHN MD Number  Date of Birth   1945                    |  
               |
|       Sonographer                  [place],                                  |  
               |
 
|                                                                   [place]                   |  
               |
| Age                  71 year(s)         Interpreting                                   |  
               |
| ILANA LOPEZ MD                                                                          |  
               |
|   Cardiologist  Gender             Female               Nurse                          |  
               |
|                                          Stress Technician Procedure                   |  
               |
| Type of Study  TTE procedure: ECHO Complete. Procedure dateDate:                       |  
               |
| 08/10/2017Start: 10:10 AM Technical Quality: Adequate                                  |  
               |
| visualizationStudy Location: Echo Lab Patient Status: RoutineHeight:                   |  
               |
| 63 inchesWeight: 171 poundsBSA: 1.81 m^2BMI: 30.29 kg/m^2Rhythm:                       |  
               |
| Normal Sinus Rhythm ConclusionsSummaryNormal left ventricular cavity                   |  
               |
| with overall normal systolic function.Mild left atrial                                 |  
               |
| enlargement.LVEF is estimated at 60 %.Mild aortic valve                                |  
               |
| regurgitation.Since prior echo of 2017, no significant                             |  
               |
| changesRecommendationNo further cardiac work-up or                                     |  
               |
| therapy.Signature-----------------------------------------------------                 |  
               |
| ----------------------- Electronically signed by ILANA LOPEZ                            |  
               |
| MD(Interpreting physician) on 08/10/2017 05:41                                         |  
               |
| PM--------------------------------------------------------------------                 |  
               |
| -------- FindingsMitral ValveMild thickening of the mitral valve                       |  
               |
| leaflets without significantregurgitation or stenosis.Aortic                           |  
               |
| ValveAortic valve appears trileaflet.Mild aortic regurgitation is                      |  
               |
| noted.Diffuse thickening (sclerosis) of the aortic valve cusps without                 |  
               |
|  reducedexcursion.Tricuspid ValveStructurally normal tricuspid valve                   |  
               |
| without significant stenosis orregurgitation.Pulmonic                                  |  
               |
| ValveStructurally normal pulmonic valve without significant stenosis                   |  
               |
| orregurgitation.Left AtriumThe left atrium is mildly dilated.Left                      |  
               |
| VentricleLeft Ventricular size appears to be normal with an ejection                   |  
               |
| fractionestimated at 60 %.Right AtriumNormal right atrial size.Right                   |  
               |
| VentricleNormal right ventricular size and function.Pericardial                        |  
               |
| EffusionNo evidence of any pericardial effusion.Pleural EffusionNo                     |  
               |
 
| evident pleural effusion identified.MiscellaneousAortic root dimension                 |  
               |
|  within normal limits.No significant change in ascending aortic                        |  
               |
| diameter since prior echo of  Valves  Mitral Valve  Peak                        |  
               |
| E-Wave: 0.83 m/s Peak A-Wave: 0.58 m/s  Tissue Doppler  Septal e'                      |  
               |
| Velocity: 0.09 m/s  Aortic Valve        Area (continuity): 1.21 cm^2                   |  
               |
|       Mean Gradient: 3.32 mmHg  LVOT  Peak Velocity: 1.07 m/s LVOT                     |  
               |
| Diameter: 1.35 cm Structures  Left Atrium  LA A/P Dimension: 4.24 cm                   |  
               |
|                                 LA Area: 19.89 cm^2 LA Vol/BSA Index:                  |  
               |
| 26 mL/m^2                              LA Volume: 47.06 ml                             |  
               |
|                                                             EF                         |  
               |
| Irophygfd93%  Left Ventricle  Diastolic Dimension: 5.37 cm                             |  
               |
|  Systolic Dimension: 3.67 cm Septum Diastolic: 0.9 cm PW Diastolic:                    |  
               |
| 0.9 cm EF Calculated: 55%  Miscellaneous  Aorta  Aortic Root: 3.41 cm                  |  
               |
| Ascending Aorta: 3.58 cm                                                               |  
               |
| 08/10/2017 05:41 PM                                                                    |  
               |
|----------------------------------------------------------------------------             | 
                |
|                                                                                        |  
               |
|Findings                                                                               |   
              |
|Mitral Valve                                                                            |  
               |
|Mild thickening of the mitral valve leaflets without significant                        |  
               |
|regurgitation or stenosis.                                                              |  
               |
|Aortic Valve                                                                            |  
               |
|Aortic valve appears trileaflet.                                                        |  
               |
|Mild aortic regurgitation is noted.                                                     |  
               |
|Diffuse thickening (sclerosis) of the aortic valve cusps without reduced                |  
               |
|excursion.                                                                             |   
              |
|Tricuspid Valve                                                                         |  
               |
|Structurally normal tricuspid valve without significant stenosis or                     |  
               |
|regurgitation.                                                                         |   
              |
|Pulmonic Valve                                                                          |  
               |
 
|Structurally normal pulmonic valve without significant stenosis or                      |  
               |
|regurgitation.                                                                         |   
              |
|Left Atrium                                                                             |  
               |
|The left atrium is mildly dilated.                                                      |  
               |
|Left Ventricle                                                                          |  
               |
|Left Ventricular size appears to be normal with an ejection fraction                    |  
               |
|estimated at 60 %.                                                                      |  
               |
|Right Atrium                                                                            |  
               |
|Normal right atrial size.                                                               |  
               |
|Right Ventricle                                                                         |  
               |
|Normal right ventricular size and function.                                             |  
               |
|Pericardial Effusion                                                                    |  
               |
|No evidence of any pericardial effusion.                                                |  
               |
|Pleural Effusion                                                                        |  
               |
|No evident pleural effusion identified.                                                 |  
               |
|Miscellaneous                                                                          |   
              |
|Aortic root dimension within normal limits.                                             |  
               |
|No significant change in ascending aortic diameter since prior echo of               |  
               |
|                                                                                    |  
               |
|                                                                                        |  
               |
|Valves                                                                                 |   
              |
|                                                                                        |  
               |
| Mitral Valve                                                                           |  
               |
|                                                                                        |  
               |
| Peak E-Wave: 0.83 m/s                                                                  |  
               |
| Peak A-Wave: 0.58 m/s                                                                  |  
               |
|                                                                                        |  
               |
| Tissue Doppler                                                                         |  
               |
|                                                                                        |  
               |
| Septal e' Velocity: 0.09 m/s                                                           |  
               |
 
|                                                                                        |  
               |
| Aortic Valve                                                                           |  
               |
|                                                                                        |  
               |
|       Area (continuity): 1.21 cm^2                                                     |  
               |
|       Mean Gradient: 3.32 mmHg                                                         |  
               |
|                                                                                        |  
               |
| LVOT                                                                                   |  
               |
|                                                                                        |  
               |
| Peak Velocity: 1.07 m/s                                                                |  
               |
| LVOT Diameter: 1.35 cm                                                                 |  
               |
|                                                                                        |  
               |
|Structures                                                                             |   
              |
|                                                                                        |  
               |
| Left Atrium                                                                            |  
               |
|                                                                                        |  
               |
| LA A/P Dimension: 4.24 cm                                 LA Area: 19.89 cm^2          |  
               |
| LA Vol/BSA Index: 26 mL/m^2                              LA Volume: 47.06 ml           |  
               |
|                                                                        EF Minadmlpp39% |  
               |
|                                                                                        |  
               |
| Left Ventricle                                                                         |  
               |
|                                                                                        |  
               |
| Diastolic Dimension: 5.37 cm             Systolic Dimension: 3.67 cm                   |  
               |
| Septum Diastolic: 0.9 cm                                                               |  
               |
| PW Diastolic: 0.9 cm                                                                   |  
               |
| EF Calculated: 55%                                                                     |  
               |
|                                                                                        |  
               |
| Miscellaneous                                                                          |  
               |
|                                                                                        |  
               |
| Aorta                                                                                  |  
               |
|                                                                                        |  
               |
 
| Aortic Root: 3.41 cm                                                                   |  
               |
| Ascending Aorta: 3.58 cm                                                               |  
               |
|                                                                                        |  
               |
+----------------------------------------------------------------------------------------+--
---------------+
 
 
 
+------------------------------------------------------------------------------------------+
| Procedure Note                                                                           |
+------------------------------------------------------------------------------------------+
|   Ace, Rad Results In - 2017  5:08 PM PDT  Transthoracic Echocardiography Report   |
| (TTE) Demographics Patient Name   YOU LOERA    Room Number                [facility] Patient |
|  Number [id number]     Date of Study          08/10/2017 Visit Number   [id number]     |
| Accession      [id number]      Referring Physician    ANASTASIA COHN MD Number Date of   |
| Birth  1945      Sonographer            CAROL ANGELO,                         |
|                                US Age            71 year(s)      Interpreting            |
| ILANA LOPEZ MD                                Cardiologist Gender         Female          |
|  Nurse                                Stress TechnicianProcedureType of Study TTE        |
| procedure: ECHO Complete.Procedure dateDate: 08/10/2017Start: 10:10 AMTechnical Quality: |
|  Adequate visualizationStudy Location: Echo LabPatient Status: RoutineHeight: 63         |
| inchesWeight: 171 poundsBSA: 1.81 m^2BMI: 30.29 kg/m^2Rhythm: Normal Sinus               |
| RhythmConclusionsSummaryNormal left ventricular cavity with overall normal systolic      |
| function.Mild left atrial enlargement.LVEF is estimated at 60 %.Mild aortic valve        |
| regurgitation.Since prior echo of 2017, no significant changesRecommendationNo       |
| further cardiac work-up or                                                               |
| therapy.Signature----------------------------------------------------------------------- |
| ----- Electronically signed by ILANA LOPEZ MD(Interpreting physician) on 08/10/2017 05:41 |
|                                                                                          |
| PM----------------------------------------------------------------------------FindingsMi |
| tral ValveMild thickening of the mitral valve leaflets without significantregurgitation  |
| or stenosis.Aortic ValveAortic valve appears trileaflet.Mild aortic regurgitation is     |
| noted.Diffuse thickening (sclerosis) of the aortic valve cusps without                   |
| reducedexcursion.Tricuspid ValveStructurally normal tricuspid valve without significant  |
| stenosis orregurgitation.Pulmonic ValveStructurally normal pulmonic valve without        |
| significant stenosis orregurgitation.Left AtriumThe left atrium is mildly dilated.Left   |
| VentricleLeft Ventricular size appears to be normal with an ejection fractionestimated   |
| at 60 %.Right AtriumNormal right atrial size.Right VentricleNormal right ventricular     |
| size and function.Pericardial EffusionNo evidence of any pericardial effusion.Pleural    |
| EffusionNo evident pleural effusion identified.MiscellaneousAortic root dimension within |
|  normal limits.No significant change in ascending aortic diameter since prior echo of    |
| [id number] Mitral Valve Peak E-Wave: 0.83 m/s Peak A-Wave: 0.58 m/s Tissue Doppler    |
| Septal e' Velocity: 0.09 m/s Aortic Valve     Area (continuity): 1.21 cm^2     Mean      |
| Gradient: 3.32 mmHg LVOT Peak Velocity: 1.07 m/s LVOT Diameter: 1.35 cmStructures Left   |
| Atrium LA A/P Dimension: 4.24 cm                      LA Area: 19.89 cm^2 LA Vol/BSA     |
| Index: 26 mL/m^2                    LA Volume: 47.06 ml                                  |
|                EF Aqqmeemyr12% Left Ventricle Diastolic Dimension: 5.37 cm               |
| Systolic Dimension: 3.67 cm Septum Diastolic: 0.9 cm PW Diastolic: 0.9 cm EF Calculated: |
|  55% Miscellaneous Aorta Aortic Root: 3.41 cm Ascending Aorta: 3.58 cm                   |
|                                                                                          |
|Conclusions                                                                              |
|Summary                                                                                  |
|Normal left ventricular cavity with overall normal systolic function.                     |
|Mild left atrial enlargement.                                                             |
|LVEF is estimated at 60 %.                                                                |
|Mild aortic valve regurgitation.                                                          |
|Since prior echo of 2017, no significant changes                                      |
 
|Recommendation                                                                           |
|No further cardiac work-up or therapy.                                                    |
|Signature                                                                                |
|----------------------------------------------------------------------------               
|
| Electronically signed by ILANA LOPEZ MD(Interpreting physician) on                        |
| 08/10/2017 05:41 PM                                                                      |
|----------------------------------------------------------------------------               
|
|                                                                                          |
|Findings                                                                                 |
|Mitral Valve                                                                              |
|Mild thickening of the mitral valve leaflets without significant                          |
|regurgitation or stenosis.                                                                |
|Aortic Valve                                                                              |
|Aortic valve appears trileaflet.                                                          |
|Mild aortic regurgitation is noted.                                                       |
|Diffuse thickening (sclerosis) of the aortic valve cusps without reduced                  |
|excursion.                                                                               |
|Tricuspid Valve                                                                           |
|Structurally normal tricuspid valve without significant stenosis or                       |
|regurgitation.                                                                           |
|Pulmonic Valve                                                                            |
|Structurally normal pulmonic valve without significant stenosis or                        |
|regurgitation.                                                                           |
|Left Atrium                                                                               |
|The left atrium is mildly dilated.                                                        |
|Left Ventricle                                                                            |
|Left Ventricular size appears to be normal with an ejection fraction                      |
|estimated at 60 %.                                                                        |
|Right Atrium                                                                              |
|Normal right atrial size.                                                                 |
|Right Ventricle                                                                           |
|Normal right ventricular size and function.                                               |
|Pericardial Effusion                                                                      |
|No evidence of any pericardial effusion.                                                  |
|Pleural Effusion                                                                          |
|No evident pleural effusion identified.                                                   |
|Miscellaneous                                                                            |
|Aortic root dimension within normal limits.                                               |
|No significant change in ascending aortic diameter since prior echo of                 |
|                                                                                      |
|                                                                                          |
|Valves                                                                                   |
|                                                                                          |
| Mitral Valve                                                                             |
|                                                                                          |
| Peak E-Wave: 0.83 m/s                                                                    |
| Peak A-Wave: 0.58 m/s                                                                    |
|                                                                                          |
| Tissue Doppler                                                                           |
|                                                                                          |
| Septal e' Velocity: 0.09 m/s                                                             |
|                                                                                          |
| Aortic Valve                                                                             |
|                                                                                          |
|     Area (continuity): 1.21 cm^2                                                         |
|     Mean Gradient: 3.32 mmHg                                                             |
|                                                                                          |
| LVOT                                                                                     |
 
|                                                                                          |
| Peak Velocity: 1.07 m/s                                                                  |
| LVOT Diameter: 1.35 cm                                                                   |
|                                                                                          |
|Structures                                                                               |
|                                                                                          |
| Left Atrium                                                                              |
|                                                                                          |
| LA A/P Dimension: 4.24 cm                      LA Area: 19.89 cm^2                       |
| LA Vol/BSA Index: 26 mL/m^2                    LA Volume: 47.06 ml                       |
|                                                EF Bxcskwnox39%                           |
|                                                                                          |
| Left Ventricle                                                                           |
|                                                                                          |
| Diastolic Dimension: 5.37 cm         Systolic Dimension: 3.67 cm                         |
| Septum Diastolic: 0.9 cm                                                                 |
| PW Diastolic: 0.9 cm                                                                     |
| EF Calculated: 55%                                                                       |
|                                                                                          |
| Miscellaneous                                                                            |
|                                                                                          |
| Aorta                                                                                    |
|                                                                                          |
| Aortic Root: 3.41 cm                                                                     |
| Ascending Aorta: 3.58 cm                                                                 |
+------------------------------------------------------------------------------------------+
 
 
 
+----------------------+---------------------+--------------------+----------------+
| Performing           | Address             | City/State/Zipcode | Phone Number   |
| Organization         |                     |                    |                |
+----------------------+---------------------+--------------------+----------------+
|   [facility]     |   [address] |   [place]  |   [phone] |
| [facility]  |                     | [id number]              |                |
| - IMAGING            |                     |                    |                |
+----------------------+---------------------+--------------------+----------------+
 documented in this encounter
 
 Visit Diagnoses
 
 
+---------------------------------------------------------------------------------------+
| Diagnosis                                                                             |
+---------------------------------------------------------------------------------------+
|   Dilated aortic root (HCC) - Primary  Aortic ectasia, unspecified site               |
+---------------------------------------------------------------------------------------+
|   Heart murmur  Undiagnosed cardiac murmurs                                           |
+---------------------------------------------------------------------------------------+
|   Pure hypercholesterolemia                                                           |
+---------------------------------------------------------------------------------------+
|   NSTEMI (non-ST elevated myocardial infarction) (HCC)  Acute myocardial infarction,  |
| subendocardial infarction, episode of care unspecified                                |
+---------------------------------------------------------------------------------------+
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
+---------------------------------------------------------------------------------------+
|   Rash  Rash and other nonspecific skin eruption                                      |
+---------------------------------------------------------------------------------------+
 documented in this encounter

## 2020-07-23 NOTE — XMS
Encounter Summary
  Created on: 2020
 
 Parrish Weeksgabe Montero
 External Reference #: [id number]
 : [birth year]
 Sex: Female
 
 Demographics
 
 
+-----------------------+----------------------+
| Address               | [address]         |
|                       | [place]  [address] |
+-----------------------+----------------------+
| Home Phone            | [phone]      |
+-----------------------+----------------------+
| Preferred Language    | Unknown              |
+-----------------------+----------------------+
| Marital Status        |               |
+-----------------------+----------------------+
| Shinto Affiliation | Unknown              |
+-----------------------+----------------------+
| Race                  | Unknown              |
+-----------------------+----------------------+
| Ethnic Group          | Unknown              |
+-----------------------+----------------------+
 
 
 Author
 
 
+--------------+--------------------------------------------+
| Author       | [facility]  |
|              | and [facility]                                |
+--------------+--------------------------------------------+
| Organization | [facility]  |
|              | and [facility]                                |
+--------------+--------------------------------------------+
| Address      | Unknown                                    |
+--------------+--------------------------------------------+
| Phone        | Unavailable                                |
+--------------+--------------------------------------------+
 
 
 
 Support
 
 
+-------------------+--------------+---------+-----------------+
| Name              | Relationship | Address | Phone           |
+-------------------+--------------+---------+-----------------+
| Melissa Lau | ECON         | Unknown | [phone] |
+-------------------+--------------+---------+-----------------+
| Mike Lau   | ECON         | Unknown | [phone] |
+-------------------+--------------+---------+-----------------+
| Damaris Coyle     | ECON         | Unknown | [phone] |
+-------------------+--------------+---------+-----------------+
 
 
 
 
 Care Team Providers
 
 
+-----------------------+------+-----------------+
| Care Team Member Name | Role | Phone           |
+-----------------------+------+-----------------+
| Chandra King MD | PCP  | [phone] |
+-----------------------+------+-----------------+
 
 
 
 Reason for Visit
 Auth/Cert
 
+--------+--------+-----------+--------------+--------------+--------------+
| Status | Reason | Specialty | Diagnoses /  | Referred By  | Referred To  |
|        |        |           | Procedures   | Contact      | Contact      |
+--------+--------+-----------+--------------+--------------+--------------+
|        |        |           |   Diagnoses  |              |              |
|        |        |           |  Chest pain  |              |              |
|        |        |           |  chest pain  |              |              |
|        |        |           |  NSTEMI      |              |              |
|        |        |           | Procedures   |              |              |
|        |        |           | CV COR ANGIO |              |              |
+--------+--------+-----------+--------------+--------------+--------------+
 
 
 
 
 Encounter Details
 
 
+--------+---------+----------------------+----------------------+--------------+
| Date   | Type    | Department           | Care Team            | Description  |
+--------+---------+----------------------+----------------------+--------------+
| / | Surgery |   [facility] |   Anastasia Brasher,  | CV [facility] Angio |
| 2017   |         |  [facility] | MD  [address]  |              |
|        |         |   [address]       |  [place]     |              |
|        |         | [place]      | [id number] [phone]  |              |
|        |         | [address]           |  [phone] (Fax)  |              |
|        |         | [phone]         |                      |              |
+--------+---------+----------------------+----------------------+--------------+
 
 
 
 Social History
 
 
+---------------+-------+-----------+--------+---------------------+
| Tobacco Use   | Types | Packs/Day | Years  | Date                |
|               |       |           | Used   |                     |
+---------------+-------+-----------+--------+---------------------+
| Former Smoker |       |           |        | Started: 1992 |
+---------------+-------+-----------+--------+---------------------+
 
 
 
 
+-------------+-------------+---------+----------+
| Alcohol Use | Drinks/Week | oz/Week | Comments |
+-------------+-------------+---------+----------+
| No          |             |         |          |
+-------------+-------------+---------+----------+
 
 
 
+------------------+---------------+
| Sex Assigned at  | Date Recorded |
| Birth            |               |
+------------------+---------------+
| Not on file      |               |
+------------------+---------------+
 documented as of this encounter
 
 Last Filed Vital Signs
 
 
+-------------------+----------------------+----------------------+----------+
| Vital Sign        | Reading              | Time Taken           | Comments |
+-------------------+----------------------+----------------------+----------+
| Blood Pressure    | 115/71               | 2017  3:10 PM  |          |
|                   |                      | PST                  |          |
+-------------------+----------------------+----------------------+----------+
| Pulse             | 61                   | 2017  3:10 PM  |          |
|                   |                      | PST                  |          |
+-------------------+----------------------+----------------------+----------+
| Temperature       | 36.2   C (97.2   F)  | 2017 12:25 PM  |          |
|                   |                      | PST                  |          |
+-------------------+----------------------+----------------------+----------+
| Respiratory Rate  | 18                   | 2017  3:10 PM  |          |
|                   |                      | PST                  |          |
+-------------------+----------------------+----------------------+----------+
| Oxygen Saturation | 99%                  | 2017  3:10 PM  |          |
|                   |                      | PST                  |          |
+-------------------+----------------------+----------------------+----------+
| Inhaled Oxygen    | -                    | -                    |          |
| Concentration     |                      |                      |          |
+-------------------+----------------------+----------------------+----------+
| Weight            | 74.3 kg (163 lb 12.8 | 2017 12:25 PM  |          |
|                   |  oz)                 | PST                  |          |
+-------------------+----------------------+----------------------+----------+
| Height            | 160 cm (5' 3")       | 2017 12:25 PM  |          |
|                   |                      | PST                  |          |
+-------------------+----------------------+----------------------+----------+
| Body Mass Index   | 29.13                | 2017 12:25 PM  |          |
|                   |                      | PST                  |          |
+-------------------+----------------------+----------------------+----------+
 documented in this encounter
 
 Discharge Summaries
 Anastasia Brasher MD - 2017  8:54 AM PSTFormatting of this note might be different fr
om the original.
 
 
 DISCHARGE SUMMARY
 
 PATIENT NAME/:  Sera Weeks, (1945)
 MEDICAL RECORD NUMBER: [id number]
 
 DATE OF ADMISSION:  2017
 DATE OF DISCHARGE:  1/3/2017
 DISCHARGING PHYSICIAN: Anastasia Brasher MD
   
 
 ADMITTING DIAGNOSIS:  NSTEMI (non-ST elevated myocardial infarction) 
 PRIMARY CARE PROVIDER: Chandra King MD
 
 DISPOSITION:   Discharge to home
 
 BRIEF HOSPITAL COURSE:
    Please see the history and physical at the time of admission.  Briefly, Ms. Weeks is a [age] y.o. female who was admitted on 2017 with NSTEMI (non-ST elevated myocardial infarctio
n) .
 She was asymptomatic after admission.  
 She had recently experienced a headache behind her right eye that was unusual for her.
 We first obtained a Head CT.  That was unremarkable.
 
 She was then taken to cath:
 FINDINGS:
 Hemodynamics:
 Left ventricular end-diastolic pressure (LVEDP) was 14 mm Hg.  
 There was no gradient across the aortic valve.
 
 Left Ventriculogram: Not performed 
 Left main coronary artery: Normal 
 Left anterior descending coronary artery: Large vessel. No 
 significant disease.
 Circumflex coronary artery: Dominant. Gives off 2 marginal and 
 the PDA. No significant disease. 10% in the AV groove.
 Right coronary artery: Small non dominant vessel anomalous 
 origin from the left coronary cusp. Minimal luminal 
 irregularity.
 
 Aortic Root: 
 Enlarged 
 No AI 
 No obvious dissection 
 Possible small aortic ulceration 
 CONCLUSIONS:
 1. Dilated Aortic Root
 2. No AI
 3. Normal Left Main
 4. Normal LAD
 5. Dominant LCX. 10% luminal irregularity in AV groove. Gives 
 off the PDA
 6. Non dominant RCA. Mild plaque. Arises anomalously from the 
 left cusp
 
 A CT angio of the chest was done and there was no evidence of dissection.
 Her aorta was minimally enlarged.
 An echo was done and her LV function was normal.  Her aortic root was at the upper limit of
 normal.
 
 In the first 24 hours she had some PVC and run of VT 3 beats.  She then had no arrhythmia.
 
 She felt well and denied pain.
 
 She was ambulated without problem.  Her blood pressure was normal and she could not tolerat
e a lot of medication.  
 
 Condition at discharge:
 Ambulating normally
 Pain free
 No arrhythmia
 
 HOSPITAL PROBLEMS: 
 Principal Problem:
   NSTEMI (non-ST elevated myocardial infarction) 
 Active Problems:
   Pure hypercholesterolemia
   Acquired hypothyroidism
   Depression
   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
 
 MASTER PROBLEM LIST: 
 Patient Active Problem List 
  Diagnosis Date Noted POA 
   Pure hypercholesterolemia 2017 Unknown 
   NSTEMI (non-ST elevated myocardial infarction)  2017 Unknown 
   Acquired hypothyroidism 2017 Unknown 
   Depression 2017 Unknown 
 
 VITALS:
 Temp: 36.2 C (97.2 F)   BP: 104/58 mmHg       Pulse: 56      Resp: 16     SpO2: 95 %
 
 WEIGHT:
 Today's Weight: 74.6 kg (164 lb 7.4 oz)
   Admit  Weight: 74.3 kg (163 lb 12.8 oz)
 
 BRIEF EXAM TODAY:
  General Appearance - alert, in no distress
  Heart -  regular rate and rhythm, S1 and S2 normal, no murmur, rub, or gallop.
  Lungs - clear to auscultation bilaterally
  Musc/Skel -  moves all extremities
  Extremities - no cyanosis.  no significant edema
  Neurologic - no focal deficits
  no hematoma
 
 CONSULTATION:
 Dietitian
 
 DIAGNOSTIC STUDIES: 
 Imaging: 
 Cath:
 Informed consent was obtained from the patient, and a time-out was performed to verify the 
patient's identification and planned procedure. Please refer to the computer log entry for
m for precise details. The patient's right radial artery was sterilely prepped. Arterial
 access was achieved with micropuncture kit. Patient was then given intravenous heparin 
as well as intra-arterial nitroglycerin and Nicardipine through the sheath. A 5 French JR4
 and a 3 DRC did not fit the coronary. A pullback across the valve was used to assess if t
here was a gradient across the aortic valve A JL 3.5 did not fit the left system nor did a
 LBU 3 did not fit. A JL3 sat close to the anomalous RCA and also opacified the Left. A 
pig tail was used to perform an aortic root. . 
 The TR band occluder device was utilized to achieve successful hemostasis of the radial art
mik. There were no immediate complications. 
 
  Estimated blood loss was: 10 cc.
  Fluro Time: 8.9 Min 
  Total Reference Air Kerma: 443.55 mGy
  Contrast: 105 mls of Omnipaque 350
 
 
 FINDINGS:
 Hemodynamics:
 Left ventricular end-diastolic pressure (LVEDP) was 14 mm Hg. There was no gradient a
cross the aortic valve.
 
 Left Ventriculogram: Not performed 
 Left main coronary artery: Normal 
 Left anterior descending coronary artery: Large vessel. No significant disease.
 Circumflex coronary artery: Dominant. Gives off 2 marginal and the PDA. No significan
t disease. 10% in the AV groove.
 Right coronary artery: Small non dominant vessel anomalous origin from the left coronary 
cusp. Minimal luminal irregularity.
 
 Aortic Root: 
 Enlarged 
 No AI 
 No obvious dissection 
 Possible small aortic ulceration 
 CONCLUSIONS:
 1. Dilated Aortic Root
 2. No AI
 3. Normal Left Main
 4. Normal LAD
 5. Dominant LCX. 10% luminal irregularity in AV groove. Gives off the PDA
 6. Non dominant RCA. Mild plaque. Arises anomalously from the left cusp
 Echo 17
 Procedure date
 Date: 2017Start: 10:22 AM
 
 Technical Quality: Adequate visualizationStudy Location: ICU
 
 Patient Status: Routine
 Height: 62.99 inchesWeight: 163 poundsBSA: 1.77 m^2BMI: 28.88 kg/m^2
 HR: 54 bpm
 
 Conclusions
 Summary
 Normal Left Ventricular contractility was noted.
 Left ventricle is normal in size and function. Ejection fraction is
 estimated at 62%.
 Trace AI
 Dilated left atrium.
 aortic root at the upper limits of normal in size
 normal IVC
 
 ECG: 
 
 Head CT:
  UNENHANCED AND ENHANCED HEAD CT 2017 2:23 PM
 
 CLINICAL HISTORY: pain behind right eye  
 
 COMPARISON: None available
 
 TECHNIQUE: Axial images are performed through the head both before and after the
 uneventful intravenous administration of 80 cc Omnipaque 350 contrast. Coronal
 and sagittal reformations are also performed. 
 
 FINDINGS: There is mild cerebral atrophy and mild, patchy hypoattenuation
 
 involving the deep and periventricular cerebral white matter. Gray-white
 differentiation is maintained. The ventricles, brainstem and cerebellum are
 unremarkable. There is no mass effect, abnormal enhancement, evidence of
 intracranial hemorrhage or extra-axial fluid collection/mass. Bilateral
 prosthetic ocular lenses are present. The orbital contents are otherwise
 symmetric and unremarkable, without visible mass lesion or proptosis. There is
 mild mucous membrane thickening within bilateral ethmoid air cells. Imaged
 paranasal sinuses are otherwise well aerated, along with the middle ear cavities
 and imaged after air cells. Asymmetric degeneration of the left greater than
 right temporomandibular joints is noted.
 
 There is calcified plaque within the cavernous segments of the internal carotid
 arteries. Major intracranial vessels and dural sinuses otherwise appear patent
 and grossly unremarkable. No conclusive aneurysm is visible.
 
 IMPRESSION - 
 1. NO VISIBLE MASS, ANEURYSM OR OTHER POTENTIAL ETIOLOGY FOR RETRO-ORBITAL
 PAIN.
 
 2. MILD CEREBRAL ATROPHY AND PROBABLE CHRONIC, MICROVASCULAR ISCHEMIC CHANGE OF
 THE CEREBRAL WHITE MATTER.
 
 3. MILD ETHMOID SINUS DISEASE.
 
 4. ASYMMETRIC DEGENERATION OF THE TEMPOROMANDIBULAR JOINTS.
 
 Dictated and Signed by: Андрей Payton MD 
 Electronically signed: 2017 2:36 PM
  
  
 
 CT angio of chest and abdomen:
 HISTORY: Rule out dissection.
 
 COMPARISON: None.
 
 PROTOCOL: Axial images of the chest and abdomen were obtained before and after
 uneventful administration of 85 mL Omnipaque 350. Coronal and sagittal
 reformations were acquired.
 
 FINDINGS:
 There is moderate atherosclerosis of the aorta extending into the branches. The
 ascending thoracic aorta is mildly ectatic and measures 3.8 cm. There is no
 evidence for dissection. The celiac artery, SMA, and TWYLA are patent. The
 bilateral renal arteries are normal. There is mild to moderate atherosclerosis
 of the iliac arteries.
 
 Neck base is normal. The heart is of normal size. The pulmonary arteries are
 unremarkable. SVC is normal. No enlarged lymph nodes are seen in the
 mediastinum, kourtney, or axilla. Trachea and esophagus are normal. There is mild
 dependent atelectasis of the bilateral lungs.
 
 The liver demonstrates normal parenchyma. The gallbladder is normal. Biliary
 ducts are unremarkable. The spleen is unremarkable. The pancreas demonstrates
 normal parenchyma and a normal pancreatic duct. Adrenal glands are normal. The
 right kidney and visualized ureter are normal. The left kidney and visualized
 ureter are normal. Stomach is normal. Imaged small bowel and colon show no acute
 findings. There is no significant abnormality in the portal veins, mesenteric
 veins, or systemic veins. No enlarged lymph nodes are visualized within the
 omentum or retroperitoneum. There is no evidence for ascites or free air.
 
 
 Body wall soft tissue structures are normal. There is mild S-shaped curvature of
 the thoracolumbar spine. Mild to moderate spondylosis is present.
 
 IMPRESSION -
 No evidence for aortic dissection.
 
 A preliminary report was sent by [facility] with no significant
 discrepancy.
 
 Selected Labs: 
 
 Recent Labs
 Lab 17
 0349 17
 0447 17
 1244 
 WBC 4.4  --  4.2 
 HGB 12.5  --  13.9 
 HCT 36.4  --  41.2 
 *  --  129* 
  144 141 
 K 3.8 3.4* 3.7 
 BUN 10 8 13 
 CREA 0.72 0.62 0.54* 
 GLU 95 82 85 
 CALCIUM 8.8 8.4 9.1 
 
 Recent Labs
 Lab 17
 0447 
 HDL 44 
 LDL 81 
 TRIG 122 
 
 Recent Results (from the past 24 hour(s)) 
 ECHO Complete 
  Collection Time: 17 11:00 
 Result Value Ref Range 
  LVEF-TTE TRANSTHORACIC ECHO 62  
 D-Dimer 
  Collection Time: 17 12:33 
 Result Value Ref Range 
  D-DIMER, QUANTITATIVE 0.61 (H) <=0.50 ug/ml 
 CBC no Differential 
  Collection Time: 17  3:49 
 Result Value Ref Range 
  WBC 4.4 4.0-11.0 K/uL 
  RBC 3.85 3.70-5.20 M/uL 
  Hgb 12.5 11.5-16.0 g/dL 
  Hct 36.4 34.0-47.0 % 
  MCV 94.6 83.0-101.0 fL 
  MCH 32.5 28.0-35.0 pg 
  MCHC 34.4 32.0-36.0 g/dL 
  RDW-CV 13.1 <15.0 % 
  Platelet Count 125 (L) 140-440 K/uL 
  MPV 9.6 fL 
 Basic Metabolic Panel 
  Collection Time: 17  3:49 
 Result Value Ref Range 
 
   136-149 mmol/L 
  K 3.8 3.5-5.1 mmol/L 
    mmol/L 
  CO2 29 24-31 mmol/L 
  ANION GAP 7 3-16 mmol/L 
  GLUCOSE 95  mg/dL 
  BUN 10 7-18 mg/dL 
  Creatinine, Serum/Plasma 0.72 0.60-1.30 mg/dL 
  eGFR if not AFRICAN AMERICAN >60 >=60 mL/min/1.73m2 
  CALCIUM 8.8 8.3-10.5 mg/dL 
  BUN/CREA 13.9  
 D-Dimer 
  Collection Time: 17  3:49 
 Result Value Ref Range 
  D-DIMER, QUANTITATIVE 0.49 <=0.50 ug/ml 
 Troponin I 
  Collection Time: 17  3:50 
 Result Value Ref Range 
  Troponin I 1.28 (HH) <0.06 ng/mL 
 
 MEDS:
  
 Discharge Medications 
  
 New Medications  
    Details 
  metoprolol tartrate 25 mg tablet 
  Take 1 tablet by mouth 2 times daily. 
 aka:  LOPRESSOR 
  
  nitroglycerin 0.4 mg SL tablet 
  Place 1 tablet under the tongue every 5 minutes as needed for Chest pain. 
 aka:  NITROSTAT 
  
  
 Unchanged Medications  
    Details 
  adult multivitamin with minerals/iron Tabs 
  Take 1 tablet by mouth Daily. 
  
  aspirin 81 mg EC tablet 
  Take 81 mg by mouth Daily. 
  
  cholecalciferol 1000 UNITS Tabs 
  Take 1,000 Units by mouth Daily. 
 aka:  VITAMIN D-3 
  
  FLUoxetine 20 mg capsule 
  Take 20 mg by mouth Daily. 
 aka:  PROzac 
  
  levothyroxine 100 mcg tablet 
  Take 100 mcg by mouth every morning (before breakfast). 
 aka:  SYNTHROID, LEVOTHROID 
  
  simvastatin 40 mg tablet 
  Take 40 mg by mouth nightly. 
 aka:  ZOCOR 
  
  tolterodine 4 MG 24 hr capsule 
 
  Take 4 mg by mouth Daily. 
 aka:  DETROL LA 
  
  
 Discontinued Medications  
    
  tocopherol 400 units capsule 
 aka:  VITAMIN E 
  
  
 
 PATIENT INSTRUCTIONS:
 
 ACTIVITY:
 For 24 Hours:  Do NOT drive
 
 For 2 days:  Do NOT lift more than 1 pound with the affected arm.
           Keep the wrist dry, clean.  No dishwashing, hot tub.
                      Avoid wrist movement such as bike riding, golf.
 
 For 5 days:  Avoid vigorous exercise that uses the affected arm.
 Do not lift greater than 10 pounds until cleared 
 
 DIET:
 cardiac diet
 
 FOLLOW-UP:
 Follow up with Dr Brasher week of  follow up with Dr King in a week.
 
 Time spent on discharge planning: less than 30 minutes
 
 Electronically signed by: 
 Anastasia Brasher MD on 1/3/2017 at 8:54Electronically signed by Anastasia Brasher MD at 01/0
3/2017  9:49 AM PSTdocumented in this encounter
 
 Discharge Instructions
 Instructions Anastasia Brasher MD - 2017For 24 Hours:  Do NOT drive
 
 For 2 days:  Do NOT lift more than 1 pound with the affected arm.
           Keep the wrist dry, clean.  No dishwashing, hot tub.
                      Avoid wrist movement such as bike riding, golf.
 
 For 5 days:  Avoid vigorous exercise that uses the affected arm.
 Do not lift greater than 10 pounds until cleared 
 
 Pain
  It is common to be sore for 1 to 2 days at the catheter insertion site.  You may take ac
etaminophen (Tylenol) for pain relief.  Follow the dosing instructions on the package.
 
  Take your regular aspirin as prescribed.
 
  Do NOT take other products that contain aspirin.  Do NOT take other anti-inflammatory pr
oducts such as ibuprofen (Advil, Motrin) or naproxen (Aleve, Naprosyn).  They may cause incr
eased bleeding.
 
  If you have severe pain at the catheter site, call your provider.
 
 Site Care
  You may remove the dressing or bandage the day after your procedure.
 
 
  Keep site clean and dry.  Clean the site gently with mild soap and water.  Re-apply a cl
[place] Band-Aid, if needed.
 
  It is normal to have a small bruise or lump at the insertion site.
 
  You may shower the day after your procedure, but avoid tub baths, hot tubs or swimming f
or the next 2 days.
 
  Inspect the site everyday for infection.  (see " When to Call for Help" on the next page
)
 
 Fluids
  Drink an extra 2 quarts of water, in addition to your normal fluid intake in the next 24
 hours.
  This helps the body eliminate the contrast dye given to you during your procedure.
  If you are on a fluid restriction follow orders from your physician.
 
 When to Call for Help
 
 If you have bleeding at the site, apply pressure to the site with clean fingers for 10 chioma
bryson.  
 Phone [phone]
 
  If the bleeding does not stop in 10 minutes, or there is a large amount of bleeding, heri
l 9-1-1.  Continue to apply pressure over the site until help arrives.
 
  If the bleeding stops, sit quietly for 2 hours while you keep the affected wrist straigh
t.  Call the cardiologist who did your procedure as soon as possible.
 
 Other Concerns
 Call the cardiologist who did your procedure if you have:
 
 Any of these Signs of Infection:
  Redness
  Fever higher than 101.5 degrees F or 38.6 degrees C
 
  Change in the bruise or lump.
  Numbness in your arm or wrist.
  Severe pain that is not relieved by Tylenol.
 
 Follow-up Care
  Continue your prescribed medications unless instructed otherwise.
 
  Follow-up with your primary health care provider and cardiologist after your procedure, 
as instructed.
 
  If you have questions or concerns about your cardiac catheterization procedure, call the
 number below.  
 
 
 documented in this encounter
 
 Medications at Time of Discharge
 
 
+----------------------+----------------------+-----------+---------+----------+-----------+
| Medication           | Sig                  | Dispensed | Refills | Start    | End Date  |
|                      |                      |           |         | Date     |           |
+----------------------+----------------------+-----------+---------+----------+-----------+
|   aspirin 81 mg EC   | Take 81 mg by mouth  |           | 0       |          |           |
 
| tablet               | Daily.               |           |         |          |           |
+----------------------+----------------------+-----------+---------+----------+-----------+
|   cholecalciferol    | Take 1,000 Units by  |           | 0       |          |           |
| (VITAMIN D-3) 1000   | mouth Daily.         |           |         |          |           |
| UNITS TABS           |                      |           |         |          |           |
+----------------------+----------------------+-----------+---------+----------+-----------+
|   FLUoxetine         | Take 20 mg by mouth  |           | 0       |          |           |
| (PROZAC) 20 mg       | Daily.               |           |         |          |           |
| capsule              |                      |           |         |          |           |
+----------------------+----------------------+-----------+---------+----------+-----------+
|   levothyroxine      | Take 100 mcg by      |           | 0       |          |           |
| (SYNTHROID,          | mouth every morning  |           |         |          |           |
| LEVOTHROID) 100 mcg  | (before breakfast).  |           |         |          |           |
| tablet               |                      |           |         |          |           |
+----------------------+----------------------+-----------+---------+----------+-----------+
|   Multiple           | Take 1 tablet by     |           | 0       |          |           |
| Vitamins-Minerals    | mouth Daily.         |           |         |          |           |
| (ADULT MULTIVITAMIN  |                      |           |         |          |           |
| WITH MINERALS/IRON)  |                      |           |         |          |           |
| TABS                 |                      |           |         |          |           |
+----------------------+----------------------+-----------+---------+----------+-----------+
|   nitroglycerin      | Place 1 tablet under |   25      | 1       | 20 |           |
| (NITROSTAT) 0.4 mg   |  the tongue every 5  | tablet    |         | 17       |           |
| SL tablet            | minutes as needed    |           |         |          |           |
|                      | for Chest pain.      |           |         |          |           |
+----------------------+----------------------+-----------+---------+----------+-----------+
|   simvastatin        | Take 40 mg by mouth  |           | 0       |          |           |
| (ZOCOR) 40 mg tablet | nightly.             |           |         |          |           |
+----------------------+----------------------+-----------+---------+----------+-----------+
|   tolterodine        | Take 4 mg by mouth   |           | 0       |          |           |
| (DETROL LA) 4 MG 24  | Daily.               |           |         |          |           |
| hr capsule           |                      |           |         |          |           |
+----------------------+----------------------+-----------+---------+----------+-----------+
|   metoprolol         | Take 1 tablet by     |   180     | 3       | 20 |  |
| tartrate (LOPRESSOR) | mouth 2 times daily. | tablet    |         | 17       | 7         |
|  25 mg tablet        |                      |           |         |          |           |
+----------------------+----------------------+-----------+---------+----------+-----------+
 documented as of this encounter
 
 Progress Notes
 Karla Hinojosa PharmD - 2017 10:31 AM PSTSera Weeks was admitted for NSTEMI a
nd discharged home today (1/3/2017)
 
 Taught AVS education to patient. Education was focused on new medications and/or changed me
dications. I explained indication, how to take, possible side effects, when to contact physi
yehuda, and monitor parameters.
 
 The patient was encouraged to make a follow-up appointment with PCP and cardiologist. 
 
 The patient verbalized understanding of the above and all questions were answered. Pharmaci
st will follow-up with patient in one to two business days. Patient was provided with a izzy
nciled discharge medication list as part of their AVS instructions. Encouraged patient to sh
are medication list with healthcare providers and keep list current.
 
 Karla Hinojosa PHARMD 1/3/2017 10:31
 Electronically signed by Karla Hinojosa PharmD at 2017 10:31 AM Sharifa Martell P harmD - 2017  5:25 PM PSTFormatting of this note might be different from the original.
 PHARMACY SERVICES: ADMISSION MEDICATION REVIEW
 
 Sera Weeks is a 71 y.o. female admitted on 17.
 
 
 Patient is a reliable historian. 
 
 Location of Patient when reviewed:
 [] ED   [x] Medical Floor
 
 Patient  s prior to admit medication and over the counter (OTC) medications/herbal supplem
ents list obtained from:
 
 [x] Verbal interview (patient is able to recall drug name, strength, frequency, etc.) 
 [] Patient/family member provided a complete current medication list or bottles
 [] MAR from SNF facility: 
 [] Doctor's office:
 [] Pharmacy    list names: 
 [x] [employer] insurance reported information
 [] Care Everywhere 
 [] Other sources: 
 
 Vaccines up to date?  
  Yes No Unsure 
 Influenza [x] [] [] 
 Pneumococcal [x] [] [] 
 Tdap [x] [] [] 
 Shingles [x] [] [] 
 
 Noted medications discrepancies or medication-related issues: 
 
  Medication added: 
 Medication: Prior to Admission Sig: 
 Cholecalciferol 1000 u 1 tab daily 
 Tocopherol 400 u 1 cap daily 
 
 Medication review performed and electronically signed by Joe Gong, Pharm Tech 
017 17:15
 Sharifa Gibbons 2017 17:24
 Electronically signed by Sharifa Posada PharmD at 2017  5:25 PM Anastasia Jameson MD - 2017  9:40 AM PSTFormatting of this note might be different from the original.
   
 
 PATIENT NAME:  Sera Weeks  
 : 1945:         AGE: 71 y.o.
  PRIMARY CARE:
 Chandra King MD 
 
 INPATIENT FOLLOW UP VISIT
 
 Date of Service: 17
 
 HISTORY OF PRESENT ILLNESS:
 Sera Weeks is a 71 y.o. female with a history of NSTEMI yesterday.  She is being seen tod
 for follow up.
 
 She feels well.  No chest pain or shortness of breath.  She had some asymptomatic NSVT over
 night.
 
 MEDICAL, SURGICAL, AND PERSONAL HISTORY
 Past Medical, Surgical, Family, and Social History are reviewed in EPIC.
 
 CURRENT PROBLEMS
 Patient Active Problem List 
 
 Diagnosis 
   Pure hypercholesterolemia 
   NSTEMI (non-ST elevated myocardial infarction)  
   Acquired hypothyroidism 
   Depression 
 
 CURRENT MEDICATIONS
 Current Facility-Administered Medications 
 Medication Dose Route Frequency Provider Last Rate Last Dose 
   acetaminophen (TYLENOL) tablet 650 mg  650 mg Oral Q6H PRN Anastasia Brasher MD   650 mg
 at 17 5487 
   aluminum & magnesium hydroxide-simethicone (MAALOX PLUS REGULAR STRENGTH) 200-200-20 mg
/5 mL suspension 30 mL  30 mL Oral Q4H PRN Anastasia Brasher MD     
   aspirin EC tablet 81 mg  81 mg Oral Daily Anastasia Brasher MD   81 mg at 17 0842 
   atorvaSTATin (LIPITOR) tablet 40 mg  40 mg Oral Nightly Anastasia Brasher MD   40 mg at 
17 
   FLUoxetine (PROzac) capsule 20 mg  20 mg Oral Daily Anastasia Brasher MD   20 mg at 01/0
 1727 
   levothyroxine (SYNTHROID, LEVOTHROID) tablet 100 mcg  100 mcg Oral QAM AC Anastasia Anguiano ms, MD   100 mcg at 17 0632 
   lidocaine 1%-EPINEPHrine 1:100,000 injection 5 mL  5 mL Infiltration Once PRN Anastasia clay MD     
   magnesium sulfate 2 g/50 mL IVPB 2 g  2 g Intravenous Once Anastasia Brasher MD 25 mL/hr
 at 17 0842 2 g at 17 0842 
   metoprolol tartrate (LOPRESSOR) tablet 50 mg  50 mg Oral BID Anastasia Brasher MD   50 m
g at 17 0842 
   nitroglycerin (NITROSTAT) SL tablet 0.4 mg  0.4 mg Sublingual Q5 Min PRN Anastasia santos MD     
   ondansetron (ZOFRAN) injection 4-8 mg  4-8 mg Intravenous Q6H PRN Anastasia Brasher MD  
   
  
 
 ALLERGIES
 Allergies 
 Allergen Reactions 
   Codeine Itching 
 
 ROS
 Otherwise negative
 OBJECTIVE:  
 
 PHYSICAL EXAM
 BP 87/54 mmHg | Pulse 60 | Temp(Src) 36.4 C (97.5 F) (Oral) | Resp 18 | Ht 1.6 m (5' 3"
) | Wt 75.3 kg (166 lb 0.1 oz) | BMI 29.41 kg/m2 | SpO2 93%
   General appearance: no acute distress.
   Eyes: no icterus. Lids normal
   Mouth: no cyanosis.
   Neck: No lymphadenopathy in the neck or supraclavicular area. Good carotid upstroke.   No
 bruits. No JVD
   Lungs: CTA    
   Heart: normal sounds  no murmur
   Abdomen: soft.
   Ext: No CCE in upper or lower extremities
   Neuro: Awake and oriented x3
 
 ECG:  
 
 LAB RESULTS: 
 LIPID
 Will add on 
 
 
 CHEMISTRY
 Lab Results 
 Component Value Date 
  GLU 82 2017 
   2017 
  K 3.4* 2017 
  * 2017 
  CO2 25 2017 
  CALCIUM 8.4 2017 
  ALKPHOS 41 2017 
  AST 30 2017 
  ALT 16 2017 
  BILITOT 0.6 2017 
  CREA 0.62 2017 
  BUN 8 2017 
 
 HEMATOLOGY
 Lab Results 
 Component Value Date 
  WBC 4.2 2017 
  HGB 13.9 2017 
  HCT 41.2 2017 
  * 2017 
 
 ASSESSMENT:  
 NSTEMI
 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
 Small ascending aortic aneurysm without dissection
 Non sustained VT in the first 24 hours of MI
 
 PLAN:  
 Echo for LV function and to assess infarct location
 Replace K
 Add magnesium
 Beta blocker
 
 Electronically signed by: Anastasia Brasher MD [facility] 2017 
 
 Portions of this chart may have been created with Dragon voice recognition software. Occasi
onal wrong-word or   sound-alike  substitutions may have occurred due to the inherent martell
itations of voice recognition software. Please read the chart carefully and recognize, using
 context, where these substitutions have occurred.
 Electronically signed by Anastasia Brasher MD at 2017  9:49 AM PSTAnastasia Brasher MD
 - 2017  2:47 PM PSTHead CT is negative.
 The risk and benefits of appropriate heart  catheterization with possible balloons, stents,
 or other appropriate techniques as indicated were discussed.  It was explained that the pos
sible complications include but are not limited to death, stroke, heart arrest,heart attack,
 emergency surgery, blood vessel injury possibly requiring surgical repair, site infection, 
acute kidney failure, and/or reactions to iodine contrast agent or sedatives. Long term risk
s include re-blockage (restenosis) and stent thrombosis (clotting).    The patient's questio
ns were answered, and the patient wishes to proceed. Patient is appropriate for conscious se
dation.
 Types of stents were discussed and DAPHNEY would be most appropriate for this patient.Davidi
shelly signed by Anastasia Brasher MD at 2017  2:48 PM PSTdocumented in this encounter
 
 H&P Notes
 Anastasia Brasher MD - 2017 12:59 PM PSTFormatting of this note might be different fr
om the original.
                           Admission H&P
 
 
 Referring Provider: ER [facility] 
 Primary Care: Dr King
 
 Reason for Admission: Unstable Angina 
 
 2017
 Sera Weeks is a 71 y.o. female
 
 History of Present Illness:
 Very pleasant 72 y/o female who awoke with jaw pain at 3:30 in the morning.  She has never 
had anything like this before.  It started on the right and then spread to both sides of her
 jaw and her tongue.
 She was seen in the ER at [facility].  She was given ASA and NTG  First set of enzymes were
 negative and second set Trop was 0.11.  She was going to get Heparin.  This was not started
.
 She is asymptomatic.  It was present for about 10 hours.
 She was a little dizzy this am.  She did not have dyspnea or diaphoresis.
 
 Past Medical History:
 Dyslipidemia
 Hypothyroidism
 Depression
 Urinary incontinence
 
 Current Medications:
 Home Medications 
 Medication Sig 
   aspirin 81 mg EC tablet Take 81 mg by mouth Daily. 
   FLUoxetine (PROZAC) 20 mg capsule Take 20 mg by mouth Daily. 
   levothyroxine (SYNTHROID, LEVOTHROID) 100 mcg tablet Take 100 mcg by mouth every mornin
g (before breakfast). 
   Multiple Vitamins-Minerals (ADULT MULTIVITAMIN WITH MINERALS/IRON) TABS Take 1 tablet b
y mouth Daily. 
   simvastatin (ZOCOR) 40 mg tablet Take 40 mg by mouth nightly. 
   tolterodine (DETROL LA) 4 MG 24 hr capsule Take 4 mg by mouth Daily. 
 
 Allergies:
 
 Allergies 
 Allergen Reactions 
   Codeine Itching 
 
 Family History:
 
 No family history on file.
 
 Social History:
 
 Social History 
 
 Social History 
   Marital Status: N/A 
   Spouse Name: N/A 
   Number of Children: N/A 
   Years of Education: N/A 
 
 Occupational History 
   Not on file. 
 
 
 Social History Main Topics 
   Smoking status: Former Smoker 
   Start date: 1992 
   Smokeless tobacco: Not on file 
   Alcohol Use: No 
   Drug Use: Not on file 
   Sexual Activity: Not on file 
 
 Other Topics Concern 
   Not on file 
 
 Social History Narrative 
   No narrative on file 
 
 Review of Systems:
 For the last weeks she has had pain behind her right eye and in her right eye brow.
 Last episode was today
 Some tingling in right hand
 Hands are cool - no changes of red, white or blue
 Review of Systems 
 Constitutional: Negative for fever, chills, weight loss, malaise/fatigue and diaphoresis. 
 HENT: Negative for congestion, ear discharge, ear pain, hearing loss, nosebleeds and tinnit
us.  
 Eyes: Positive for pain. Negative for blurred vision, double vision, photophobia, discharge
 and redness. 
 Respiratory: Negative for cough, hemoptysis, sputum production, shortness of breath and whe
ezing.  
 Cardiovascular: Positive for palpitations and leg swelling. Negative for orthopnea, claudic
ation and PND. 
 Gastrointestinal: Negative for heartburn, nausea, vomiting, blood in stool and melena. 
 Genitourinary: Negative for dysuria, urgency, frequency, hematuria and flank pain. 
 Musculoskeletal: Negative for myalgias, back pain and neck pain. 
 Skin: Negative for itching and rash. 
 Neurological: Positive for dizziness and headaches. Negative for tingling, tremors, sensory
 change, speech change, focal weakness, seizures, loss of consciousness and weakness. 
 Endo/Heme/Allergies: Negative for environmental allergies and polydipsia. Does not bruise/b
leed easily. 
 Psychiatric/Behavioral: Positive for depression. Negative for suicidal ideas, hallucination
s, memory loss and substance abuse. The patient is not nervous/anxious and does not have ins
omnia.  
 
 Reviewed 10 systems, all were negative except as listed in HPI
 
 Physical Exam:
 /64 mmHg | Pulse 67 | Temp(Src) 36.2 C (97.2 F) | Resp 15 | Ht 1.6 m (5' 3") | Wt
 74.3 kg (163 lb 12.8 oz) | BMI 29.02 kg/m2 | SpO2 96%
 Physical Exam 
 Constitutional: She is oriented to person, place, and time. She appears well-developed and 
well-nourished. No distress. 
 HENT: 
 Head: Normocephalic and atraumatic. 
 Eyes: Conjunctivae and EOM are normal. Pupils are equal, round, and reactive to light. No s
cleral icterus. 
 Neck: Normal range of motion. No JVD present. No tracheal deviation present. No thyromegaly
 present. 
 Cardiovascular: Normal rate, regular rhythm, normal heart sounds and intact distal pulses. 
 Exam reveals no gallop and no friction rub.  
 No murmur heard.
 Pulmonary/Chest: Effort normal and breath sounds normal. No stridor. No respiratory distres
s. She has no wheezes. She has no rales. She exhibits no tenderness. 
 
 Abdominal: Soft. Bowel sounds are normal. She exhibits no distension and no mass. There is 
no tenderness. There is no rebound and no guarding. 
 Musculoskeletal: She exhibits no edema or tenderness. 
 Lymphadenopathy: 
   She has no cervical adenopathy. 
 Neurological: She is alert and oriented to person, place, and time. No cranial nerve defici
t. 
 Skin: Skin is warm and dry. She is not diaphoretic. 
 Psychiatric: She has a normal mood and affect. Her behavior is normal. 
 
 Test Results:
 ECG reviewed by me from ER and here Incomplete RBBB otherwise normal
 
 Recent Results (from the past 24 hour(s)) 
 Troponin I 
 Result Value Ref Range 
  Troponin I 2.01 (HH) <0.06 ng/mL 
 CBC no Differential 
 Result Value Ref Range 
  WBC 4.2 4.0-11.0 K/uL 
  RBC 4.33 3.70-5.20 M/uL 
  Hgb 13.9 11.5-16.0 g/dL 
  Hct 41.2 34.0-47.0 % 
  MCV 95.0 83.0-101.0 fL 
  MCH 32.2 28.0-35.0 pg 
  MCHC 33.9 32.0-36.0 g/dL 
  RDW-CV 12.8 <15.0 % 
  Platelet Count 129 (L) 140-440 K/uL 
  MPV 10.1 fL 
 Basic Metabolic Panel 
 Result Value Ref Range 
   136-149 mmol/L 
  K 3.7 3.5-5.1 mmol/L 
    mmol/L 
  CO2 30 24-31 mmol/L 
  ANION GAP 6 3-16 mmol/L 
  GLUCOSE 85  mg/dL 
  BUN 13 7-18 mg/dL 
  Creatinine, Serum/Plasma 0.54 (L) 0.60-1.30 mg/dL 
  eGFR if not AFR[place] AMERICAN >60 >=60 mL/min/1.73m2 
  CALCIUM 9.1 8.3-10.5 mg/dL 
  BUN/CREA 24.1  
 PTT 
 Result Value Ref Range 
  PTT 32 22-36 seconds 
 ECG 12 lead 
 Result Value Ref Range 
  VENTRICULAR RATE EKG 57 BPM 
  ATRIAL RATE 57 BPM 
  P-R INTERVAL 146 ms 
  QRS DURATION 78 ms 
  Q-T INTERVAL 450 ms 
  Q-T INTERVAL (CORRECTED) 438 ms 
  P WAVE AXIS 34 degrees 
  QRS AXIS -2 degrees 
  T AXIS 56 degrees 
  INTERPRETATION TEXT   
   Sinus bradycardia
 Low voltage QRS
 Nonspecific ST abnormality
 
 Borderline ECG
 No previous ECGs available
 Confirmed by ANASTASIA BRASHER MD ([id number]) on 2017 1:32:01 PM
  
  
 Impression/Plan:
 
 1. NSTEMI
 2. Pain behind right eye - etiology unknown
 3. Dyslipidemia
 4. Strong + family history of CAD
 Plan:
 CT of head with contrast r/o intracranial pathology
 Then proceed with cath
 As heparin was never started at [facility] I will hold off pending the head CT
 
 Electronically signed by Anastasia Brasher MD at 2017  1:40 PM PSTdocumented in this e
ncounter
 
 Procedure Notes
 Anastasia Brasher MD - 2017  5:10 PM PSTAssociated Order(s): CV CARDIAC PROCEDUREForm
atting of this note might be different from the original.
 Sera Weeks is a 71 y.o. female patient.
 1. Acquired hypothyroidism  
 2. NSTEMI (non-ST elevated myocardial infarction) (HCC)  
 3. Pure hypercholesterolemia  
 
 Past Medical History 
 Diagnosis Date 
   Hyperlipidemia  
   Hypothyroidism  
   Incontinence  
   Depression  
 
 Blood pressure 124/78, pulse 63, temperature 36.2 C (97.2 F), resp. rate 19, height 1.6
 m (5' 3"), weight 74.3 kg (163 lb 12.8 oz), SpO2 90 %.
 
 CV Cardiac Procedure
 Date/Time: 2017 17:10
 Performed by: ANASTASIA BRASHER
 Authorized by: ANASTASIA BRASHER
 Consent: Verbal consent obtained. Written consent obtained.
 Risks and benefits: risks, benefits and alternatives were discussed
 Consent given by: patient
 Patient understanding: patient states understanding of the procedure being performed
 Patient consent: the patient's understanding of the procedure matches consent given
 Procedure consent: procedure consent matches procedure scheduled
 Relevant documents: relevant documents present and verified
 Test results: test results available and properly labeled
 Site marked: the operative site was marked
 Imaging studies: imaging studies available
 Required items: required blood products, implants, devices, and special equipment available
 Patient identity confirmed: verbally with patient and arm band
 Time out: Immediately prior to procedure a "time out" was called to verify the correct maki
ent, procedure, equipment, support staff and site/side marked as required.
 Preparation: Patient was prepped and draped in the usual sterile fashion.
 Local anesthesia used: yes
 Local anesthetic: lidocaine 1% without epinephrine
 Patient sedated: yes
 Sedation type: moderate (conscious) sedation
 
 Sedatives: fentanyl and midazolam
 Vitals: Vital signs were monitored during sedation.
 Patient tolerance: Patient tolerated the procedure well with no immediate complications
 
 [facility]
 
 LEFT CARDIAC CATHETERIZATION REPORT
 
 PATIENT NAME:  Sera Weeks
 YOB: 1945
 MEDICAL RECORD NUMBER:  [id number]
 DATE OF PROCEDURE:   2017
                                                                             
 PRIMARY CARE PROVIDER:  Chandra King MD
 :   Anastasia Brasher MD, [facility], [facility]
 
 PRE-PROCEDURE DIAGNOSIS:   NSTEMI
 POST-PROCEDURE DIAGNOSIS:  same
 
 PROCEDURES PERFORMED:      
 1.  Left Heart Catheterization for pressures
 2.  Coronary Angiography
 3.  Aortic Root 
 
 DESCRIPTION OF PROCEDURE:
 
 Informed consent was obtained from the patient, and a time-out was performed to verify the 
patient's identification and planned procedure.  Please refer to the computer log entry form
 for precise details.  The patient's right radial artery was sterilely prepped.  Arterial ac
cess was achieved with  micropuncture kit.  Patient was then given intravenous heparin as we
ll as intra-arterial nitroglycerin and Nicardipine through the sheath.  A 5 French JR4 and a
 3 DRC did not fit the coronary.  A pullback across the valve was used to assess if there wa
s a gradient across the aortic valve A  JL 3.5 did not fit the left system nor did a LBU 3 d
id not fit.  A JL3 sat close to the anomalous RCA and also opacified the Left.  A pig tail w
as used to perform an aortic root. .  
 The TR band occluder device was utilized to achieve successful hemostasis of the radial art
mik.  There were no immediate complications.  
 
   Estimated blood loss was: 10 cc.
   Fluro Time: 8.9 Min 
   Total Reference Air Kerma: 443.55 mGy
   Contrast: 105 mls of Omnipaque 350
 
 FINDINGS:
 Hemodynamics:
 Left ventricular end-diastolic pressure (LVEDP) was 14 mm Hg.    There was no gradient acro
ss the aortic valve.
 
 Left Ventriculogram:  Not performed 
 Left main coronary artery:  Normal 
 Left anterior descending coronary artery:  Large vessel.  No significant disease.
 Circumflex coronary artery:  Dominant.  Gives off 2 marginal and the PDA.  No significant d
isease.  10% in the AV groove.
 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
usp.  Minimal luminal irregularity.
 
 Aortic Root:  
 Enlarged 
 No AI 
 No obvious dissection 
 
 Possible small aortic ulceration 
 CONCLUSIONS:
 1. Dilated Aortic Root
 2. No AI
 3. Normal Left Main
 4. Normal LAD
 5. Dominant LCX.  10% luminal irregularity in AV groove.  Gives off the PDA
 6. Non dominant RCA.  Mild plaque.  Arises anomalously from the left cusp
 
 CLINICAL IMPRESSION AND RECOMMENDATIONS 
 Will need CT of aorta to rule out dissection
 Echo for LV function
 
 Anastasia Brasher MD, FAC, [facility]
 DATE/TIME:  2017 17:11
 2017 17:11
 
 Portions of this chart were created with Dragon voice recognition software. Occasional wron
g-word or   sound-alike  substitutions may have occurred due to the inherent limitations 
of voice recognition software. Please read the chart carefully and recognize, using context,
 where these substitutions have occurred
 
 Anastasia Brasher
 2017
 Electronically signed by Anastasia Brasher MD at 2017  7:10 PM PSTdocumented in this e
ncounter
 
 Miscellaneous Notes
 Plan of Care - Dayan Daniel RN - 2017  1:59 PM PSTProblem: Patient Care Ove
rview (Adult)
 Goal: Care Team Goals & Evaluation
 PROBLEM-RELATED GOALS:
 **
 
 STRATEGY TO ACHIEVE GOALS:
 **
 
 RESTRAINT-RELATED GOALS:
 
 STRATEGIES TO ACHIEVE RESTRAINT GOALS: 
 Outcome: Improving
 Goal Evaluation:
 Pt denied any chest pain. Ambulated in the hallway independently x 2. No complaint while wa
lking. 
 
 All questions answered. No further questions or concerns raised. Pt ambulated out of the ro
om in a stable condition. Electronically signed by Dayan Daniel RN at 2017  2
:11 PM PSTPlan of Care - Sivan Shin RN - 2017  6:12 AM PSTProblem: Patient 
Care Overview (Adult)
 Goal: Personalization
 Needs & Preferences 
 Outcome: Improving
 Slept well.  Denies chest pain or sob.  VSS.  Independent in room.
 
   Electronically signed by Sivan Shin RN at 2017  6:12 AM PSTPlan of Care -
 Carmelita Cox RN - 2017  4:10 PM PSTProblem: Discharge Planning
 Goal: Patient will be discharged in a safe manner
 Discharge planning:
 This CM met with patient, Sera Weeks. 
 
 She lives alone in her home in [place]. 
 She is very independent. She does have a cane, but rately needs to use it. She does not use
 any hoe oxygen, nor CPAP. 
 She does still drives, does her own cooking, Cleaning and is able to care for herself. 
 Her son lives across the street from her and would help with any of her needs if she had an
y. 
 Her PCP is Dr King and she use [facility] in [place]. 
 Her son will be her transportation home when she is medically stable for discharge. 
 Electronically signed by: Carmelita Cox RN 2017 16:10
   
 
   Electronically signed by Carmelita Cox RN at 2017  4:10 PM PSTPlan of [facility]
Milton Chiu Chaplain - 2017  8:23 AM PSTProblem: Patient Care Overview (Adult)
 Goal: Care Team Goals & Evaluation
 PROBLEM-RELATED GOALS:
 **
 
 STRATEGY TO ACHIEVE GOALS:
 **
 
 RESTRAINT-RELATED GOALS:
 
 STRATEGIES TO ACHIEVE RESTRAINT GOALS: 
 Spiritual Care
 
 Sera Weeks is a 71 y.o. female who is admitted for Chest pain [R07.9] 
 
 This is my first visit with the patient.   Sera Weeks  was asleep at the time of the visi
t. 
 Her granddaughter was present supporting at her bedside and had been there all night.  
 
 Spiritual Evaluation:
 Patient was asleep so no assessment made.   
 Granddaughter is from [place], showed very positive compassion toward her grandmother.
 
 Spiritual Interventions:
 Engaged in social conversation; active listening and pastoral support was provided.  
 Granddaughter stated no needs, offered Chaplaincy support if any needs arose. 
 
 Spiritual Outcomes:
 Expressed appreciation for the visit. 
 
 Spiritual Goals/Followup:
 Will see the patient as requested. If there are any other spiritual care issues that arise,
 please contact .
 
   
 
   Electronically signed by Chaplain Sharan at 2017  8:23 AM PSTPlan of DAVID borrero - Sivan Shin RN - 2017  7:33 AM PSTProblem: Patient Care Overview (Adul
t)
 Goal: Personalization
 Needs & Preferences 
 Outcome: Improving
 Patient slept well overnight.  No c/o chest pain or SOB reported.  R. Radial dressing c/d/i
.  Good pulses.
 
   Electronically signed by Sivan Shin RN at 2017  7:33 AM PSTdocumented in 
this encounter
 
 
 Plan of Treatment
 Not on filedocumented as of this encounter
 
 Procedures
 
 
+---------------------+--------+-------------+----------------------+----------------------+
| Procedure Name      | Priori | Date/Time   | Associated Diagnosis | Comments             |
|                     | ty     |             |                      |                      |
+---------------------+--------+-------------+----------------------+----------------------+
| TROPONIN I          | Routin | 2017  |                      |   Results for this   |
|                     | e      |  3:50 AM    |                      | procedure are in the |
|                     |        | PST         |                      |  results section.    |
+---------------------+--------+-------------+----------------------+----------------------+
| D-DIMER             | Routin | 2017  |                      |   Results for this   |
|                     | e      |  3:49 AM    |                      | procedure are in the |
|                     |        | PST         |                      |  results section.    |
+---------------------+--------+-------------+----------------------+----------------------+
| CBC NO DIFFERENTIAL | Routin | 2017  |                      |   Results for this   |
|                     | e      |  3:49 AM    |                      | procedure are in the |
|                     |        | PST         |                      |  results section.    |
+---------------------+--------+-------------+----------------------+----------------------+
| BASIC METABOLIC     | Routin | 2017  |                      |   Results for this   |
| PANEL               | e      |  3:49 AM    |                      | procedure are in the |
|                     |        | PST         |                      |  results section.    |
+---------------------+--------+-------------+----------------------+----------------------+
| D-DIMER             | Routin | 2017  |                      |   Results for this   |
|                     | e      | 12:33 PM    |                      | procedure are in the |
|                     |        | PST         |                      |  results section.    |
+---------------------+--------+-------------+----------------------+----------------------+
| ECHO COMPLETE       | Routin | 2017  |                      |   Results for this   |
|                     | e      | 11:00 AM    |                      | procedure are in the |
|                     |        | PST         |                      |  results section.    |
+---------------------+--------+-------------+----------------------+----------------------+
| ECG 12 LEAD         | Routin | 2017  |                      |   Results for this   |
|                     | e      |  6:03 AM    |                      | procedure are in the |
|                     |        | PST         |                      |  results section.    |
+---------------------+--------+-------------+----------------------+----------------------+
| LIPID PANEL         | Add-On | 2017  |                      |   Results for this   |
|                     |        |  4:47 AM    |                      | procedure are in the |
|                     |        | PST         |                      |  results section.    |
+---------------------+--------+-------------+----------------------+----------------------+
| EXTRA LAVENDER TOP  | Routin | 2017  |                      |   Results for this   |
| TUBE                | e      |  4:47 AM    |                      | procedure are in the |
|                     |        | PST         |                      |  results section.    |
+---------------------+--------+-------------+----------------------+----------------------+
| COMPREHENSIVE       | Routin | 2017  |                      |   Results for this   |
| METABOLIC PANEL     | e      |  4:47 AM    |                      | procedure are in the |
|                     |        | PST         |                      |  results section.    |
+---------------------+--------+-------------+----------------------+----------------------+
| TROPONIN I          | Routin | 2017  |                      |   Results for this   |
|                     | e      |  8:33 PM    |                      | procedure are in the |
|                     |        | PST         |                      |  results section.    |
+---------------------+--------+-------------+----------------------+----------------------+
| CT ANGIOGRAM CHEST  | Routin | 2017  |                      |   Results for this   |
| ABDOMEN W CONTRAST  | e      |  5:05 PM    |                      | procedure are in the |
|                     |        | PST         |                      |  results section.    |
+---------------------+--------+-------------+----------------------+----------------------+
| CV COR ANGIO        | Routin | 2017  |                      |   Results for this   |
|                     | e      |  4:26 PM    |                      | procedure are in the |
 
|                     |        | PST         |                      |  results section.    |
+---------------------+--------+-------------+----------------------+----------------------+
| CT HEAD W WO        | STAT   | 2017  |                      |   Results for this   |
| CONTRAST            |        |  2:23 PM    |                      | procedure are in the |
|                     |        | PST         |                      |  results section.    |
+---------------------+--------+-------------+----------------------+----------------------+
| ECG 12 LEAD         | STAT   | 2017  |                      |   Results for this   |
|                     |        | 12:50 PM    |                      | procedure are in the |
|                     |        | PST         |                      |  results section.    |
+---------------------+--------+-------------+----------------------+----------------------+
| TROPONIN I          | Routin | 2017  |                      |   Results for this   |
|                     | e      | 12:44 PM    |                      | procedure are in the |
|                     |        | PST         |                      |  results section.    |
+---------------------+--------+-------------+----------------------+----------------------+
| PTT                 | Routin | 2017  |                      |   Results for this   |
|                     | e      | 12:44 PM    |                      | procedure are in the |
|                     |        | PST         |                      |  results section.    |
+---------------------+--------+-------------+----------------------+----------------------+
| CBC NO DIFFERENTIAL | Routin | 2017  |                      |   Results for this   |
|                     | e      | 12:44 PM    |                      | procedure are in the |
|                     |        | PST         |                      |  results section.    |
+---------------------+--------+-------------+----------------------+----------------------+
| BASIC METABOLIC     | STAT   | 2017  |                      |   Results for this   |
| PANEL               |        | 12:44 PM    |                      | procedure are in the |
|                     |        | PST         |                      |  results section.    |
+---------------------+--------+-------------+----------------------+----------------------+
| XR CHEST PA OR AP   | Routin | 2017  |                      |   Results for this   |
|                     | e      |  4:55 AM    |                      | procedure are in the |
|                     |        | PST         |                      |  results section.    |
+---------------------+--------+-------------+----------------------+----------------------+
| ECG - EXTERNAL SCAN |        | 2017  |                      |   Results for this   |
|                     |        | 12:00 AM    |                      | procedure are in the |
|                     |        | PST         |                      |  results section.    |
+---------------------+--------+-------------+----------------------+----------------------+
 documented in this encounter
 
 Results
 Troponin I (2017  3:50 AM PST)
 
+------------+--------------------------+-------------+-------------+--------------+
| Component  | Value                    | Ref Range   | Performed   | Pathologist  |
|            |                          |             | At          | Signature    |
+------------+--------------------------+-------------+-------------+--------------+
| Troponin I | 1.28 ()Comment:        | <0.06 ng/mL | [facility]  |              |
|            | Consistent with previous |             | [facility]    |              |
|            |  results. Reference      |             | MEDICAL     |              |
|            | Ranges:0.00-0.06 =       |             | CENTER -    |              |
|            | NORMAL>0.06       =      |             | LABORATORY  |              |
|            | SUSPICIOUS FOR           |             |             |              |
|            | MYOCARDIAL DAMAGE NOTE:  |             |             |              |
|            | Values greater than 0.50 |             |             |              |
|            |  ng/mL have been shown   |             |             |              |
|            | to be strongly           |             |             |              |
|            | associated with acute    |             |             |              |
|            | myocardial infarction.   |             |             |              |
|            | The [employer] College of  |             |             |              |
|            | Cardiology (ACC)         |             |             |              |
|            | recommends a decision    |             |             |              |
|            | limit of 0.06 ng/mL for  |             |             |              |
|            | this assay.   Results    |             |             |              |
 
|            | greater than 0.06 can    |             |             |              |
|            | reflect a pre-infarct    |             |             |              |
|            | acute coronary syndrome, |             |             |              |
|            |  but can also reflect    |             |             |              |
|            | myocardial necrosis or   |             |             |              |
|            | injury that is not due   |             |             |              |
|            | to coronary artery       |             |             |              |
|            | disease.   Some of these |             |             |              |
|            |  causes are sepsis,      |             |             |              |
|            | hypocolemia, atrial      |             |             |              |
|            | fibrillation, heart      |             |             |              |
|            | failure, pulmonary       |             |             |              |
|            | embolism, myocarditis,   |             |             |              |
|            | myocardial contusion,    |             |             |              |
|            | and renal failure.   The |             |             |              |
|            |  diagnosis of myocardial |             |             |              |
|            |  infarction should be    |             |             |              |
|            | based on a combination   |             |             |              |
|            | of the patient's         |             |             |              |
|            | clinical presentation    |             |             |              |
|            | and the clinical         |             |             |              |
|            | laboratory test results  |             |             |              |
|            | (especially serial       |             |             |              |
|            | troponin levels).        |             |             |              |
+------------+--------------------------+-------------+-------------+--------------+
 
 
 
+----------+
| Specimen |
+----------+
| Blood    |
+----------+
 
 
 
+----------------------+--------------------+--------------------+----------------+
| Performing           | Address            | City/State/Zipcode | Phone Number   |
| Organization         |                    |                    |                |
+----------------------+--------------------+--------------------+----------------+
|   [facility]     |   [address] |   [place]  |   [phone] |
| [facility]  |                    | [address]              |                |
| - LABORATORY         |                    |                    |                |
+----------------------+--------------------+--------------------+----------------+
 CBC no Differential (2017  3:49 AM PST)
 
+-------------+---------+-----------------+-------------+--------------+
| Component   | Value   | Ref Range       | Performed   | Pathologist  |
|             |         |                 | At          | Signature    |
+-------------+---------+-----------------+-------------+--------------+
| White Blood | 4.4     | 4.0 - 11.0 K/uL | [facility]  |              |
|  Cells      |         |                 |  [facility]    |              |
|             |         |                 | MEDICAL     |              |
|             |         |                 | CENTER -    |              |
|             |         |                 | LABORATORY  |              |
+-------------+---------+-----------------+-------------+--------------+
| Red Blood   | 3.85    | 3.70 - 5.20     | [facility]  |              |
| Cells       |         | M/uL            | [facility]    |              |
|             |         |                 | MEDICAL     |              |
|             |         |                 | CENTER -    |              |
 
|             |         |                 | LABORATORY  |              |
+-------------+---------+-----------------+-------------+--------------+
| Hemoglobin  | 12.5    | 11.5 - 16.0     | [facility]  |              |
|             |         | g/dL            | [facility]    |              |
|             |         |                 | MEDICAL     |              |
|             |         |                 | CENTER -    |              |
|             |         |                 | LABORATORY  |              |
+-------------+---------+-----------------+-------------+--------------+
| Hematocrit  | 36.4    | 34.0 - 47.0 %   | [facility]  |              |
|             |         |                 | [facility]    |              |
|             |         |                 | MEDICAL     |              |
|             |         |                 | CENTER -    |              |
|             |         |                 | LABORATORY  |              |
+-------------+---------+-----------------+-------------+--------------+
| MCV         | 94.6    | 83.0 - 101.0 fL | [facility]  |              |
|             |         |                 | [facility]    |              |
|             |         |                 | MEDICAL     |              |
|             |         |                 | CENTER -    |              |
|             |         |                 | LABORATORY  |              |
+-------------+---------+-----------------+-------------+--------------+
| MCH         | 32.5    | 28.0 - 35.0 pg  | [facility]  |              |
|             |         |                 | [facility]    |              |
|             |         |                 | MEDICAL     |              |
|             |         |                 | CENTER -    |              |
|             |         |                 | LABORATORY  |              |
+-------------+---------+-----------------+-------------+--------------+
| MCHC        | 34.4    | 32.0 - 36.0     | [facility]  |              |
|             |         | g/dL            | [facility]    |              |
|             |         |                 | MEDICAL     |              |
|             |         |                 | CENTER -    |              |
|             |         |                 | LABORATORY  |              |
+-------------+---------+-----------------+-------------+--------------+
| RDW-CV      | 13.1    | <15.0 %         | [facility]  |              |
|             |         |                 | [facility]    |              |
|             |         |                 | MEDICAL     |              |
|             |         |                 | CENTER -    |              |
|             |         |                 | LABORATORY  |              |
+-------------+---------+-----------------+-------------+--------------+
| Platelet    | 125 (L) | 140 - 440 K/uL  | [facility]  |              |
| Count       |         |                 | [facility]    |              |
|             |         |                 | MEDICAL     |              |
|             |         |                 | CENTER -    |              |
|             |         |                 | LABORATORY  |              |
+-------------+---------+-----------------+-------------+--------------+
| MPV         | 9.6     | fL              | [facility]  |              |
|             |         |                 | [facility]    |              |
|             |         |                 | MEDICAL     |              |
|             |         |                 | CENTER -    |              |
|             |         |                 | LABORATORY  |              |
+-------------+---------+-----------------+-------------+--------------+
 
 
 
+----------+
| Specimen |
+----------+
| Blood    |
+----------+
 
 
 
 
+----------------------+--------------------+--------------------+----------------+
| Performing           | Address            | City/State/Zipcode | Phone Number   |
| Organization         |                    |                    |                |
+----------------------+--------------------+--------------------+----------------+
|   [facility]     |   [address] |   [place]  |   [phone] |
| [facility]  |                    | [address]              |                |
| - LABORATORY         |                    |                    |                |
+----------------------+--------------------+--------------------+----------------+
 D-Dimer (2017  3:49 AM PST)
 
+-------------+--------------------------+--------------+-------------+--------------+
| Component   | Value                    | Ref Range    | Performed   | Pathologist  |
|             |                          |              | At          | Signature    |
+-------------+--------------------------+--------------+-------------+--------------+
| D-Dimer     | 0.49Comment: This        | <=0.50 ug/ml | [facility]  |              |
| Quantitativ | quantitative D-Dimer     |              | [facility]    |              |
| e           | assay has been evaluated |              | MEDICAL     |              |
|             |  for screening for       |              | CENTER -    |              |
|             | venous thrombotic        |              | LABORATORY  |              |
|             | disease, and may be      |              |             |              |
|             | useful in ruling out,    |              |             |              |
|             | but not ruling in        |              |             |              |
|             | disease. Values less     |              |             |              |
|             | than 0.50 ug/mL FEU      |              |             |              |
|             | (Fibrinogen Equivalent   |              |             |              |
|             | Units) have a negative   |              |             |              |
|             | predictive value of      |              |             |              |
|             | approximately 95% for    |              |             |              |
|             | ruling out large         |              |             |              |
|             | pulmonary emboli or      |              |             |              |
|             | proximal deep vein       |              |             |              |
|             | thrombosis. Distal DVT   |              |             |              |
|             | are not excluded. An     |              |             |              |
|             | elevated D-dimer can be  |              |             |              |
|             | present in patients with |              |             |              |
|             |  liver disease,          |              |             |              |
|             | pregnancy, eclampsia,    |              |             |              |
|             | heart disease and some   |              |             |              |
|             | cancers among other      |              |             |              |
|             | conditions. The presence |              |             |              |
|             |  of rheumatoid factor at |              |             |              |
|             |  a level >50 IU/mL may   |              |             |              |
|             | falsely elevate the      |              |             |              |
|             | determined D-dimer       |              |             |              |
|             | levels.                  |              |             |              |
+-------------+--------------------------+--------------+-------------+--------------+
 
 
 
+----------+
| Specimen |
+----------+
| Blood    |
+----------+
 
 
 
+----------------------+--------------------+--------------------+----------------+
| Performing           | Address            | City/State/Zipcode | Phone Number   |
 
| Organization         |                    |                    |                |
+----------------------+--------------------+--------------------+----------------+
|   [facility]     |   [address] |   [place]  |   [phone] |
| [facility]  |                    | [address]              |                |
| - LABORATORY         |                    |                    |                |
+----------------------+--------------------+--------------------+----------------+
 Basic Metabolic Panel (2017  3:49 AM PST)
 
+-------------+--------------------------+-----------------+-------------+--------------+
| Component   | Value                    | Ref Range       | Performed   | Pathologist  |
|             |                          |                 | At          | Signature    |
+-------------+--------------------------+-----------------+-------------+--------------+
| Na          | 141                      | 136 - 149       | [facility]  |              |
|             |                          | mmol/L          | [facility]    |              |
|             |                          |                 | [facility]     |              |
|             |                          |                 | CENTER -    |              |
|             |                          |                 | LABORATORY  |              |
+-------------+--------------------------+-----------------+-------------+--------------+
| K           | 3.8                      | 3.5 - 5.1       | [facility]  |              |
|             |                          | mmol/L          | [facility]    |              |
|             |                          |                 | [facility]     |              |
|             |                          |                 | CENTER -    |              |
|             |                          |                 | LABORATORY  |              |
+-------------+--------------------------+-----------------+-------------+--------------+
| Cl          | 105                      | 98 - 109 mmol/L | [facility]  |              |
|             |                          |                 | [facility]    |              |
|             |                          |                 | [facility]ICAL     |              |
|             |                          |                 | CENTER -    |              |
|             |                          |                 | LABORATORY  |              |
+-------------+--------------------------+-----------------+-------------+--------------+
| CO2         | 29                       | 24 - 31 mmol/L  | [facility]  |              |
|             |                          |                 | [facility]    |              |
|             |                          |                 | MEDICAL     |              |
|             |                          |                 | CENTER -    |              |
|             |                          |                 | LABORATORY  |              |
+-------------+--------------------------+-----------------+-------------+--------------+
| Anion Gap   | 7                        | 3 - 16 mmol/L   | [facility]  |              |
|             |                          |                 | [facility]    |              |
|             |                          |                 | MEDICAL     |              |
|             |                          |                 | CENTER -    |              |
|             |                          |                 | LABORATORY  |              |
+-------------+--------------------------+-----------------+-------------+--------------+
| Glucose     | 95                       | 70 - 109 mg/dL  | [facility]  |              |
|             |                          |                 | [facility]    |              |
|             |                          |                 | MEDICAL     |              |
|             |                          |                 | CENTER -    |              |
|             |                          |                 | LABORATORY  |              |
+-------------+--------------------------+-----------------+-------------+--------------+
| BUN         | 10                       | 7 - 18 mg/dL    | [facility]  |              |
|             |                          |                 | [facility]    |              |
|             |                          |                 | MEDICAL     |              |
|             |                          |                 | CENTER -    |              |
|             |                          |                 | LABORATORY  |              |
+-------------+--------------------------+-----------------+-------------+--------------+
| Creatinine  | 0.72                     | 0.60 - 1.30     | [facility]  |              |
|             |                          | mg/dL           | [facility]    |              |
|             |                          |                 | MEDICAL     |              |
|             |                          |                 | CENTER -    |              |
|             |                          |                 | LABORATORY  |              |
+-------------+--------------------------+-----------------+-------------+--------------+
 
| eGFR if not | >60Comment: GLOMERULAR   | >=60            | [facility]  |              |
|      | FILTRATION               | mL/min/1.73m2   | [facility]    |              |
| [employer]    | RATE,ESTIMATED           |                 | MEDICAL     |              |
|             |   mL/min/1.62n8Ymkm than |                 | CENTER -    |              |
|             |  60    Chronic kidney    |                 | LABORATORY  |              |
|             | disease,if found over a  |                 |             |              |
|             | 3-month period.Less than |                 |             |              |
|             |  15    Kidney failureFor |                 |             |              |
|             |                   |                 |             |              |
|             | Americans,multiply the   |                 |             |              |
|             | calculated GFR by 1.21.  |                 |             |              |
|             |                          |                 |             |              |
+-------------+--------------------------+-----------------+-------------+--------------+
| Calcium     | 8.8                      | 8.3 - 10.5      | [facility]  |              |
|             |                          | mg/dL           | [facility]    |              |
|             |                          |                 | [facility]     |              |
|             |                          |                 | CENTER -    |              |
|             |                          |                 | LABORATORY  |              |
+-------------+--------------------------+-----------------+-------------+--------------+
| BUN/Creatin | 13.9                     |                 | [facility]  |              |
| ine Ratio   |                          |                 | [facility]    |              |
|             |                          |                 | MEDICAL     |              |
|             |                          |                 | CENTER -    |              |
|             |                          |                 | LABORATORY  |              |
+-------------+--------------------------+-----------------+-------------+--------------+
 
 
 
+----------+
| Specimen |
+----------+
| Blood    |
+----------+
 
 
 
+----------------------+--------------------+--------------------+----------------+
| Performing           | Address            | City/State/Zipcode | Phone Number   |
| Organization         |                    |                    |                |
+----------------------+--------------------+--------------------+----------------+
|   [facility]     |   [address] |   [place]  |   [phone] |
| [facility]  |                    | [address]              |                |
| - LABORATORY         |                    |                    |                |
+----------------------+--------------------+--------------------+----------------+
 D-Dimer (2017 12:33 PM PST)
 
+-------------+--------------------------+--------------+-------------+--------------+
| Component   | Value                    | Ref Range    | Performed   | Pathologist  |
|             |                          |              | At          | Signature    |
+-------------+--------------------------+--------------+-------------+--------------+
| D-Dimer     | 0.61 (H)Comment: This    | <=0.50 ug/ml | [facility]  |              |
| Quantitativ | quantitative D-Dimer     |              | [facility]    |              |
| e           | assay has been evaluated |              | MEDICAL     |              |
|             |  for screening for       |              | CENTER -    |              |
|             | venous thrombotic        |              | LABORATORY  |              |
|             | disease, and may be      |              |             |              |
|             | useful in ruling out,    |              |             |              |
|             | but not ruling in        |              |             |              |
|             | disease. Values less     |              |             |              |
|             | than 0.50 ug/mL FEU      |              |             |              |
 
|             | (Fibrinogen Equivalent   |              |             |              |
|             | Units) have a negative   |              |             |              |
|             | predictive value of      |              |             |              |
|             | approximately 95% for    |              |             |              |
|             | ruling out large         |              |             |              |
|             | pulmonary emboli or      |              |             |              |
|             | proximal deep vein       |              |             |              |
|             | thrombosis. Distal DVT   |              |             |              |
|             | are not excluded. An     |              |             |              |
|             | elevated D-dimer can be  |              |             |              |
|             | present in patients with |              |             |              |
|             |  liver disease,          |              |             |              |
|             | pregnancy, eclampsia,    |              |             |              |
|             | heart disease and some   |              |             |              |
|             | cancers among other      |              |             |              |
|             | conditions. The presence |              |             |              |
|             |  of rheumatoid factor at |              |             |              |
|             |  a level >50 IU/mL may   |              |             |              |
|             | falsely elevate the      |              |             |              |
|             | determined D-dimer       |              |             |              |
|             | levels.                  |              |             |              |
+-------------+--------------------------+--------------+-------------+--------------+
 
 
 
+----------+
| Specimen |
+----------+
| Blood    |
+----------+
 
 
 
+----------------------+--------------------+--------------------+----------------+
| Performing           | Address            | City/State/Zipcode | Phone Number   |
| Organization         |                    |                    |                |
+----------------------+--------------------+--------------------+----------------+
|   [facility]     |   [address] |   [place]  |   [phone] |
| [facility]  |                    | [address]              |                |
| - LABORATORY         |                    |                    |                |
+----------------------+--------------------+--------------------+----------------+
 ECHO Complete (2017 11:00 AM PST)
 
+-------------+-------+-----------+------------+--------------+
| Component   | Value | Ref Range | Performed  | Pathologist  |
|             |       |           | At         | Signature    |
+-------------+-------+-----------+------------+--------------+
| LVEF-TTE    | 62    |           |            |              |
| TRANSTHORAC |       |           |            |              |
| IC ECHO     |       |           |            |              |
+-------------+-------+-----------+------------+--------------+
 
 
 
+----------+
| Specimen |
+----------+
|          |
+----------+
 
 
 
 
+----------------------------------------------------------------------------------------+--
------------+
| Narrative                                                                              | P
erformed At |
+----------------------------------------------------------------------------------------+--
------------+
|   This result has an attachment that is not available.  Transthoracic                  |  
            |
| Echocardiography Report (TTE)  Demographics  Patient Name      YOU                   |  
            |
| SERA Room Number                   453  Patient Number   [id number]                 |  
            |
|    Date of Study                2017  Visit Number                               |  
            |
|   [id number]  Accession          [id number]    Referring Physician                    |  
            |
|     ANASTASIA BRASHER MD Number  Date of Birth    1945                              |  
            |
| Sonographer                   [facility]   Age                               |  
            |
|       71 year(s)    Interpreting                  ANASTASIA BRASHER MD                    |  
            |
|                                           Cardiologist  Gender                         |  
            |
|         Female          Nurse Procedure Type of Study  TTE procedure:                  |  
            |
| ECHO Complete. Procedure dateDate: 2017Start: 10:22 AM Technical                 |  
            |
|  Quality: Adequate visualizationStudy Location: ICU Patient Status:                    |  
            |
| RoutineHeight: 62.99 inchesWeight: 163 poundsBSA: 1.77 m^2BMI: 28.88                   |  
            |
| kg/m^2HR: 54 bpm ConclusionsSummaryNormal Left Ventricular                             |  
            |
| contractility was noted.Left ventricle is normal in size and function.                 |  
            |
|  Ejection fraction isestimated at 62%.Trace AIDilated left                             |  
            |
| atrium.aortic root at the upper limits of normal in sizenormal IVC                     |  
            |
| Signature-------------------------------------------------------------                 |  
            |
| --------------- Electronically signed by ANASTASIA BRASHER,                                |  
            |
| MD(Interpreting physician) on 2017 01:08                                         |  
            |
| PM--------------------------------------------------------------------                 |  
            |
| -------- FindingsMitral ValveStructurally normal mitral valve without                  |  
            |
| significant stenosis orregurgitation.Aortic ValveTrace AITricuspid                     |  
            |
| ValveTrace TRnot adequate to calculate PA pressurePulmonic ValveThe                    |  
            |
| pulmonic valve was not well visualized.Left AtriumDilated left                         |  
            |
| atrium.Left VentricleLeft ventricle is normal in size and function.                    |  
            |
 
| Ejection fraction isestimated at 62%.Right AtriumNormal right                          |  
            |
| atrium.Right VentricleNormal right ventricular structure and                           |  
            |
| function.Pericardial EffusionNo evidence of pericardial effusion.                      |  
            |
| Miscellaneousaortic root at the upper limits of normal in sizenormal                   |  
            |
| IVC Valves  Mitral Valve  Peak E-Wave: 0.84 m/s Peak A-Wave: 0.45 m/s                  |  
            |
|  Tissue Doppler  Septal e' Velocity: 0.08 m/s Septal E/e' Ratio:10.49                  |  
            |
|  Aortic Valve        Area (continuity): 0.98 cm^2       Mean Gradient:                 |  
            |
|  3.31 mmHg  LVOT  Peak Velocity: 0.81 m/s LVOT Diameter: 1.25 cm                       |  
            |
| Structures  Left Atrium  LA A/P Dimension: 4.87 cm                                     |  
            |
|               LA Area: 19.01 cm^2 LA Vol/BSA Index: 30 mL/m^2                          |  
            |
|                      LA Volume: 53.51 ml                                               |  
            |
|                                           EF Tyoutzeef80%  Left                        |  
            |
| Ventricle  Diastolic Dimension: 4.81 cm             Systolic                           |  
            |
| Dimension: 2.84 cm Septum Diastolic: 0.91 cm PW Diastolic: 0.91 cm EF                  |  
            |
| Calculated: 62%  Miscellaneous  Aorta  Aortic Root: 3.56 cm Ascending                  |  
            |
| Aorta: 2.3 cm                                                                          |  
            |
|aortic root at the upper limits of normal in size                                       |  
            |
|normal IVC                                                                              |  
            |
|                                                                                        |  
            |
|Signature                                                                              |   
           |
|----------------------------------------------------------------------------             | 
             |
| Electronically signed by ANASTASIA BRASHER MD(Interpreting physician) on                  |  
            |
| 2017 01:08 PM                                                                    |  
            |
|----------------------------------------------------------------------------             | 
             |
|                                                                                        |  
            |
|Findings                                                                               |   
           |
|Mitral Valve                                                                            |  
            |
|Structurally normal mitral valve without significant stenosis or                        |  
            |
|regurgitation.                                                                         |   
           |
|Aortic Valve                                                                            |  
            |
 
|Trace AI                                                                                |  
            |
|Tricuspid Valve                                                                         |  
            |
|Trace TR                                                                                |  
            |
|not adequate to calculate PA pressure                                                   |  
            |
|Pulmonic Valve                                                                          |  
            |
|The pulmonic valve was not well visualized.                                             |  
            |
|Left Atrium                                                                             |  
            |
|Dilated left atrium.                                                                    |  
            |
|Left Ventricle                                                                          |  
            |
|Left ventricle is normal in size and function. Ejection fraction is                     |  
            |
|estimated at 62%.                                                                       |  
            |
|Right Atrium                                                                            |  
            |
|Normal right atrium.                                                                    |  
            |
|Right Ventricle                                                                         |  
            |
|Normal right ventricular structure and function.                                        |  
            |
|Pericardial Effusion                                                                    |  
            |
|No evidence of pericardial effusion.                                                    |  
            |
|                                                                                        |  
            |
|Miscellaneous                                                                          |   
           |
|aortic root at the upper limits of normal in size                                       |  
            |
|normal IVC                                                                              |  
            |
|                                                                                        |  
            |
|Valves                                                                                 |   
           |
|                                                                                        |  
            |
| Mitral Valve                                                                           |  
            |
|                                                                                        |  
            |
| Peak E-Wave: 0.84 m/s                                                                  |  
            |
| Peak A-Wave: 0.45 m/s                                                                  |  
            |
|                                                                                        |  
            |
| Tissue Doppler                                                                         |  
            |
 
|                                                                                        |  
            |
| Septal e' Velocity: 0.08 m/s                                                           |  
            |
| Septal E/e' Ratio:10.49                                                                |  
            |
|                                                                                        |  
            |
| Aortic Valve                                                                           |  
            |
|                                                                                        |  
            |
|       Area (continuity): 0.98 cm^2                                                     |  
            |
|       Mean Gradient: 3.31 mmHg                                                         |  
            |
|                                                                                        |  
            |
| LVOT                                                                                   |  
            |
|                                                                                        |  
            |
| Peak Velocity: 0.81 m/s                                                                |  
            |
| LVOT Diameter: 1.25 cm                                                                 |  
            |
|                                                                                        |  
            |
|Structures                                                                             |   
           |
|                                                                                        |  
            |
| Left Atrium                                                                            |  
            |
|                                                                                        |  
            |
| LA A/P Dimension: 4.87 cm                                 LA Area: 19.01 cm^2          |  
            |
| LA Vol/BSA Index: 30 mL/m^2                              LA Volume: 53.51 ml           |  
            |
|                                                                        EF [id number]% |  
            |
|                                                                                        |  
            |
| Left Ventricle                                                                         |  
            |
|                                                                                        |  
            |
| Diastolic Dimension: 4.81 cm             Systolic Dimension: 2.84 cm                   |  
            |
| Septum Diastolic: 0.91 cm                                                              |  
            |
| PW Diastolic: 0.91 cm                                                                  |  
            |
| EF Calculated: 62%                                                                     |  
            |
|                                                                                        |  
            |
| Miscellaneous                                                                          |  
            |
 
|                                                                                        |  
            |
| Aorta                                                                                  |  
            |
|                                                                                        |  
            |
| Aortic Root: 3.56 cm                                                                   |  
            |
| Ascending Aorta: 2.3 cm                                                                |  
            |
|                                                                                        |  
            |
+----------------------------------------------------------------------------------------+--
------------+
 
 
 
+------------------------------------------------------------------------------------------+
| Procedure Note                                                                           |
+------------------------------------------------------------------------------------------+
|   Ace, Rad Results In - 2017  1:08 PM [facility]  Transthoracic Echocardiography Report   |
| (TTE) Demographics Patient Name    YOU LOERA Room Number             453 Patient      |
| Number  [id number]  Date of Study           2017 Visit Number    [id number]      |
| Accession       [id number]   Referring Physician     ANASTASIA BRASHER MD Number Date of    |
| Birth   1945   Sonographer             CAROL ANGELO,  Age             71    |
| year(s)   Interpreting            ANASTASIA BRASHER MD                                      |
| Cardiologist Gender          Female       NurseProcedureType of Study TTE procedure:     |
| ECHO Complete.Procedure dateDate: 2017Start: 10:22 AMTechnical Quality: Adequate   |
| visualizationStudy Location: ICUPatient Status: RoutineHeight: 62.99 inchesWeight: 163   |
| poundsBSA: 1.77 m^2BMI: 28.88 kg/m^2HR: 54 bpmConclusionsSummaryNormal Left Ventricular  |
| contractility was noted.Left ventricle is normal in size and function. Ejection fraction |
|  isestimated at 62%.Trace AIDilated left atrium.aortic root at the upper limits of       |
| normal in sizenormal                                                                     |
| IVCSignature---------------------------------------------------------------------------- |
|  Electronically signed by ANASTASIA BRASHER MD(Interpreting physician) on 2017 01:08  |
| PM----------------------------------------------------------------------------FindingsMi |
| tral ValveStructurally normal mitral valve without significant stenosis                  |
| orregurgitation.Aortic ValveTrace AITricuspid ValveTrace TRnot adequate to calculate PA  |
| pressurePulmonic ValveThe pulmonic valve was not well visualized.Left AtriumDilated left |
|  atrium.Left VentricleLeft ventricle is normal in size and function. Ejection fraction   |
| isestimated at 62%.Right AtriumNormal right atrium.Right VentricleNormal right           |
| ventricular structure and function.Pericardial EffusionNo evidence of pericardial        |
| effusion.Miscellaneousaortic root at the upper limits of normal in sizenormal IVCValves  |
| Mitral Valve Peak E-Wave: 0.84 m/s Peak A-Wave: 0.45 m/s Tissue Doppler Septal e'        |
| Velocity: 0.08 m/s Septal E/e' Ratio:10.49 Aortic Valve     Area (continuity): 0.98 cm^2 |
|      Mean Gradient: 3.31 mmHg LVOT Peak Velocity: 0.81 m/s LVOT Diameter: 1.25           |
| cmStructures Left Atrium LA A/P Dimension: 4.87 cm                      LA Area: 19.01   |
| cm^2 LA Vol/BSA Index: 30 mL/m^2                    LA Volume: 53.51 ml                  |
|                                EF Heuwvyhez14% Left Ventricle Diastolic Dimension: 4.81  |
| cm         Systolic Dimension: 2.84 cm Septum Diastolic: 0.91 cm PW Diastolic: 0.91 cm   |
| EF Calculated: 62% Miscellaneous Aorta Aortic Root: 3.56 cm Ascending Aorta: 2.3 cm      |
|Patient Status: Routine                                                                   |
|Height: 62.99 inchesWeight: 163 poundsBSA: 1.77 m^2BMI: 28.88 kg/m^2                      |
|HR: 54 bpm                                                                                |
|                                                                                          |
|Conclusions                                                                              |
|Summary                                                                                  |
|Normal Left Ventricular contractility was noted.                                          |
|Left ventricle is normal in size and function. Ejection fraction is                       |
|estimated at 62%.                                                                         |
 
|Trace AI                                                                                  |
|Dilated left atrium.                                                                      |
|aortic root at the upper limits of normal in size                                         |
|normal IVC                                                                                |
|                                                                                          |
|Signature                                                                                |
|----------------------------------------------------------------------------               
|
| Electronically signed by ANASTASIA BRASHER MD(Interpreting physician) on                    |
| 2017 01:08 PM                                                                      |
|----------------------------------------------------------------------------               
|
|                                                                                          |
|Findings                                                                                 |
|Mitral Valve                                                                              |
|Structurally normal mitral valve without significant stenosis or                          |
|regurgitation.                                                                           |
|Aortic Valve                                                                              |
|Trace AI                                                                                  |
|Tricuspid Valve                                                                           |
|Trace TR                                                                                  |
|not adequate to calculate PA pressure                                                     |
|Pulmonic Valve                                                                            |
|The pulmonic valve was not well visualized.                                               |
|Left Atrium                                                                               |
|Dilated left atrium.                                                                      |
|Left Ventricle                                                                            |
|Left ventricle is normal in size and function. Ejection fraction is                       |
|estimated at 62%.                                                                         |
|Right Atrium                                                                              |
|Normal right atrium.                                                                      |
|Right Ventricle                                                                           |
|Normal right ventricular structure and function.                                          |
|Pericardial Effusion                                                                      |
|No evidence of pericardial effusion.                                                      |
|                                                                                          |
|Miscellaneous                                                                            |
|aortic root at the upper limits of normal in size                                         |
|normal IVC                                                                                |
|                                                                                          |
|Valves                                                                                   |
|                                                                                          |
| Mitral Valve                                                                             |
|                                                                                          |
| Peak E-Wave: 0.84 m/s                                                                    |
| Peak A-Wave: 0.45 m/s                                                                    |
|                                                                                          |
| Tissue Doppler                                                                           |
|                                                                                          |
| Septal e' Velocity: 0.08 m/s                                                             |
| Septal E/e' Ratio:10.49                                                                  |
|                                                                                          |
| Aortic Valve                                                                             |
|                                                                                          |
|     Area (continuity): 0.98 cm^2                                                         |
|     Mean Gradient: 3.31 mmHg                                                             |
|                                                                                          |
| LVOT                                                                                     |
|                                                                                          |
| Peak Velocity: 0.81 m/s                                                                  |
 
| LVOT Diameter: 1.25 cm                                                                   |
|                                                                                          |
|Structures                                                                               |
|                                                                                          |
| Left Atrium                                                                              |
|                                                                                          |
| LA A/P Dimension: 4.87 cm                      LA Area: 19.01 cm^2                       |
| LA Vol/BSA Index: 30 mL/m^2                    LA Volume: 53.51 ml                       |
|                                                EF Zacnndbar36%                           |
|                                                                                          |
| Left Ventricle                                                                           |
|                                                                                          |
| Diastolic Dimension: 4.81 cm         Systolic Dimension: 2.84 cm                         |
| Septum Diastolic: 0.91 cm                                                                |
| PW Diastolic: 0.91 cm                                                                    |
| EF Calculated: 62%                                                                       |
|                                                                                          |
| Miscellaneous                                                                            |
|                                                                                          |
| Aorta                                                                                    |
|                                                                                          |
| Aortic Root: 3.56 cm                                                                     |
| Ascending Aorta: 2.3 cm                                                                  |
+------------------------------------------------------------------------------------------+
 ECG 12 lead (2017  6:03 AM PST)
 
+-------------+--------------------------+-----------+------------+--------------+
| Component   | Value                    | Ref Range | Performed  | Pathologist  |
|             |                          |           | At         | Signature    |
+-------------+--------------------------+-----------+------------+--------------+
| VENTRICULAR | 56                       | BPM       | [facility]  |              |
|  RATE EKG   |                          |           |            |              |
+-------------+--------------------------+-----------+------------+--------------+
| ATRIAL RATE | 56                       | BPM       | [facility]  |              |
+-------------+--------------------------+-----------+------------+--------------+
| P-R         | 148                      | ms        | [facility]  |              |
| INTERVAL    |                          |           |            |              |
+-------------+--------------------------+-----------+------------+--------------+
| QRS         | 80                       | ms        | [facility]  |              |
| DURATION    |                          |           |            |              |
+-------------+--------------------------+-----------+------------+--------------+
| Q-T         | 470                      | ms        | [facility]  |              |
| INTERVAL    |                          |           |            |              |
+-------------+--------------------------+-----------+------------+--------------+
| Q-T         | 453                      | ms        | [facility]  |              |
| INTERVAL    |                          |           |            |              |
| (CORRECTED) |                          |           |            |              |
+-------------+--------------------------+-----------+------------+--------------+
| P WAVE AXIS | 19                       | degrees   | [facility]  |              |
+-------------+--------------------------+-----------+------------+--------------+
| QRS AXIS    | 4                        | degrees   | [facility]  |              |
+-------------+--------------------------+-----------+------------+--------------+
| T AXIS      | 49                       | degrees   | [facility]  |              |
+-------------+--------------------------+-----------+------------+--------------+
| INTERPRETAT | Sinus bradycardiaLow     |           | [facility]  |              |
| ION TEXT    | voltage QRSBorderline    |           |            |              |
|             | ECGWhen compared with    |           |            |              |
|             | ECG of 2017       |           |            |              |
|             | 12:50,No significant     |           |            |              |
|             | change was               |           |            |              |
 
|             | foundConfirmed by        |           |            |              |
|             | ANASTASIA BRASHER MD        |           |            |              |
|             | ([id number]) on 2017      |           |            |              |
|             | 9:45:24 AMAlso confirmed |           |            |              |
|             |  by MANJINDER FINE MD      |           |            |              |
|             | ([id number])   on 2017    |           |            |              |
|             | 9:52:00 AM               |           |            |              |
+-------------+--------------------------+-----------+------------+--------------+
 
 
 
+----------+
| Specimen |
+----------+
|          |
+----------+
 
 
 
+----------------------------------------------------------+--------------+
| Narrative                                                | Performed At |
+----------------------------------------------------------+--------------+
|   This result has an attachment that is not available.   |              |
+----------------------------------------------------------+--------------+
 
 
 
+--------------+---------+--------------------+--------------+
| Performing   | Address | City/State/Zipcode | Phone Number |
| Organization |         |                    |              |
+--------------+---------+--------------------+--------------+
|   [facility]  |         |                    |              |
+--------------+---------+--------------------+--------------+
 Lipid Panel (2017  4:47 AM PST)
 
+-------------+-------------------------+-----------------+-------------+--------------+
| Component   | Value                   | Ref Range       | Performed   | Pathologist  |
|             |                         |                 | At          | Signature    |
+-------------+-------------------------+-----------------+-------------+--------------+
| Triglycerid | 122                     | 35 - 160 mg/dL  | [facility]  |              |
| es          |                         |                 | [facility]    |              |
|             |                         |                 | [facility]     |              |
|             |                         |                 | [facility] -    |              |
|             |                         |                 | LABORATORY  |              |
+-------------+-------------------------+-----------------+-------------+--------------+
| Cholesterol | 149 (L)                 | 150 - 200 mg/dL | [facility]  |              |
|             |                         |                 | [facility]    |              |
|             |                         |                 | [facility]     |              |
|             |                         |                 | [facility] -    |              |
|             |                         |                 | LABORATORY  |              |
+-------------+-------------------------+-----------------+-------------+--------------+
| HDL         | 44Comment:     New HDL  | 28 - 83 mg/dL   | [facility]  |              |
|             | Reference Range as of   |                 | [facility]    |              |
|             | September 10, 2015      |                 | MEDICAL     |              |
|             | Values may be 10-20%    |                 | CENTER -    |              |
|             | lower with new,         |                 | LABORATORY  |              |
|             | standardized method.    |                 |             |              |
+-------------+-------------------------+-----------------+-------------+--------------+
| Chol/HDL    | 3.4                     |                 | [facility]  |              |
| Ratio       |                         |                 | [facility]    |              |
 
|             |                         |                 | [facility]     |              |
|             |                         |                 | CENTER -    |              |
|             |                         |                 | LABORATORY  |              |
+-------------+-------------------------+-----------------+-------------+--------------+
| LDL,        | 81                      | <=130 mg/dL     | [facility]  |              |
| Calculated  |                         |                 | [facility]    |              |
|             |                         |                 | [facility]     |              |
|             |                         |                 | C[facility] -    |              |
|             |                         |                 | LABORATORY  |              |
+-------------+-------------------------+-----------------+-------------+--------------+
 
 
 
+----------+
| Specimen |
+----------+
| Blood    |
+----------+
 
 
 
+----------------------+--------------------+--------------------+----------------+
| Performing           | Address            | City/State/Zipcode | Phone Number   |
| Organization         |                    |                    |                |
+----------------------+--------------------+--------------------+----------------+
|   [facility]     |   [address] |   [place]  |   [phone] |
| [facility]  |                    | [address]              |                |
| - LABORATORY         |                    |                    |                |
+----------------------+--------------------+--------------------+----------------+
 Extra Lavender Top Tube (2017  4:47 AM PST)
 
+-----------+-------+-----------+-------------+--------------+
| Component | Value | Ref Range | Performed   | Pathologist  |
|           |       |           | At          | Signature    |
+-----------+-------+-----------+-------------+--------------+
| Extra     | Done  |           | [facility]  |              |
| Lavender  |       |           | [facility]    |              |
| Top Tube  |       |           | MEDICAL     |              |
|           |       |           | CENTER -    |              |
|           |       |           | LABORATORY  |              |
+-----------+-------+-----------+-------------+--------------+
 
 
 
+----------+
| Specimen |
+----------+
| Blood    |
+----------+
 
 
 
+----------------------+--------------------+--------------------+----------------+
| Performing           | Address            | City/State/Zipcode | Phone Number   |
| Organization         |                    |                    |                |
+----------------------+--------------------+--------------------+----------------+
|   [facility]     |   [address] |   [place]  |   [phone] |
| [facility]  |                    | [id number]              |                |
| - LABORATORY         |                    |                    |                |
+----------------------+--------------------+--------------------+----------------+
 
 Comprehensive Metabolic Panel (2017  4:47 AM PST)
 
+-------------+--------------------------+-----------------+-------------+--------------+
| Component   | Value                    | Ref Range       | Performed   | Pathologist  |
|             |                          |                 | At          | Signature    |
+-------------+--------------------------+-----------------+-------------+--------------+
| Na          | 144                      | 136 - 149       | [facility]  |              |
|             |                          | mmol/L          | [facility]    |              |
|             |                          |                 | [facility]     |              |
|             |                          |                 | CENTER -    |              |
|             |                          |                 | LABORATORY  |              |
+-------------+--------------------------+-----------------+-------------+--------------+
| K           | 3.4 (L)                  | 3.5 - 5.1       | [facility]  |              |
|             |                          | mmol/L          | [facility]    |              |
|             |                          |                 | [facility]     |              |
|             |                          |                 | CENTER -    |              |
|             |                          |                 | LABORATORY  |              |
+-------------+--------------------------+-----------------+-------------+--------------+
| Cl          | 111 (H)                  | 98 - 109 mmol/L | [facility]  |              |
|             |                          |                 | [facility]    |              |
|             |                          |                 | MED[facility]     |              |
|             |                          |                 | CENTER -    |              |
|             |                          |                 | LABORATORY  |              |
+-------------+--------------------------+-----------------+-------------+--------------+
| CO2         | 25                       | 24 - 31 mmol/L  | [facility]  |              |
|             |                          |                 | [facility]    |              |
|             |                          |                 | MEDICAL     |              |
|             |                          |                 | CENTER -    |              |
|             |                          |                 | LABORATORY  |              |
+-------------+--------------------------+-----------------+-------------+--------------+
| Anion Gap   | 8                        | 3 - 16 mmol/L   | [facility]  |              |
|             |                          |                 | [facility]    |              |
|             |                          |                 | MEDICAL     |              |
|             |                          |                 | CENTER -    |              |
|             |                          |                 | LABORATORY  |              |
+-------------+--------------------------+-----------------+-------------+--------------+
| Glucose     | 82                       | 70 - 109 mg/dL  | [facility]  |              |
|             |                          |                 | [facility]    |              |
|             |                          |                 | MEDICAL     |              |
|             |                          |                 | CENTER -    |              |
|             |                          |                 | LABORATORY  |              |
+-------------+--------------------------+-----------------+-------------+--------------+
| BUN         | 8                        | 7 - 18 mg/dL    | [facility]  |              |
|             |                          |                 | [facility]    |              |
|             |                          |                 | MEDICAL     |              |
|             |                          |                 | CENTER -    |              |
|             |                          |                 | LABORATORY  |              |
+-------------+--------------------------+-----------------+-------------+--------------+
| Creatinine  | 0.62                     | 0.60 - 1.30     | [facility]  |              |
|             |                          | mg/dL           | [facility]    |              |
|             |                          |                 | MEDICAL     |              |
|             |                          |                 | CENTER -    |              |
|             |                          |                 | LABORATORY  |              |
+-------------+--------------------------+-----------------+-------------+--------------+
| eGFR if not | >60Comment: GLOMERULAR   | >=60            | [facility]  |              |
|      | FILTRATION               | mL/min/1.73m2   | [facility]    |              |
| [employer]    | RATE,ESTIMATED           |                 | MEDICAL     |              |
|             |   mL/min/1.42o2Pkzd than |                 | CENTER -    |              |
|             |  60    Chronic kidney    |                 | LABORATORY  |              |
|             | disease,if found over a  |                 |             |              |
 
|             | 3-month period.Less than |                 |             |              |
|             |  15    Kidney failureFor |                 |             |              |
|             |                   |                 |             |              |
|             | Americans,multiply the   |                 |             |              |
|             | calculated GFR by 1.21.  |                 |             |              |
|             |                          |                 |             |              |
+-------------+--------------------------+-----------------+-------------+--------------+
| Calcium     | 8.4                      | 8.3 - 10.5      | [facility]  |              |
|             |                          | mg/dL           | [facility]    |              |
|             |                          |                 | MEDICAL     |              |
|             |                          |                 | CENTER -    |              |
|             |                          |                 | LABORATORY  |              |
+-------------+--------------------------+-----------------+-------------+--------------+
| Albumin     | 3.4                      | 3.2 - 5.0 g/dL  | [facility]  |              |
|             |                          |                 | [facility]    |              |
|             |                          |                 | MEDICAL     |              |
|             |                          |                 | CENTER -    |              |
|             |                          |                 | LABORATORY  |              |
+-------------+--------------------------+-----------------+-------------+--------------+
| Bilirubin   | 0.6                      | 0.1 - 1.5 mg/dL | [facility]  |              |
| Total       |                          |                 | [facility]    |              |
|             |                          |                 | MEDICAL     |              |
|             |                          |                 | CENTER -    |              |
|             |                          |                 | LABORATORY  |              |
+-------------+--------------------------+-----------------+-------------+--------------+
| Total       | 5.6 (L)                  | 6.0 - 7.8 g/dL  | [facility]  |              |
| Protein     |                          |                 | [facility]    |              |
|             |                          |                 | MEDICAL     |              |
|             |                          |                 | CENTER -    |              |
|             |                          |                 | LABORATORY  |              |
+-------------+--------------------------+-----------------+-------------+--------------+
| AST         | 30                       | 10 - 42 U/L     | [facility]  |              |
|             |                          |                 | [facility]    |              |
|             |                          |                 | MEDICAL     |              |
|             |                          |                 | CENTER -    |              |
|             |                          |                 | LABORATORY  |              |
+-------------+--------------------------+-----------------+-------------+--------------+
| ALT         | 16                       | 6 - 45 U/L      | [facility]  |              |
|             |                          |                 | [facility]    |              |
|             |                          |                 | MEDICAL     |              |
|             |                          |                 | CENTER -    |              |
|             |                          |                 | LABORATORY  |              |
+-------------+--------------------------+-----------------+-------------+--------------+
| Alkaline    | 41                       | 40 - 110 U/L    | [facility]  |              |
| Phosphatase |                          |                 | [facility]    |              |
|             |                          |                 | MEDICAL     |              |
|             |                          |                 | CENTER -    |              |
|             |                          |                 | LABORATORY  |              |
+-------------+--------------------------+-----------------+-------------+--------------+
| Globulin    | 2.2                      | 2.1 - 3.8 g/dL  | [facility]  |              |
|             |                          |                 | [facility]    |              |
|             |                          |                 | MEDICAL     |              |
|             |                          |                 | CENTER -    |              |
|             |                          |                 | LABORATORY  |              |
+-------------+--------------------------+-----------------+-------------+--------------+
| Albumin/Carmela | 1.5                      | 0.8 - 2.0       | [facility]  |              |
| bulin Ratio |                          |                 | [facility]    |              |
|             |                          |                 | MEDICAL     |              |
|             |                          |                 | CENTER -    |              |
|             |                          |                 | LABORATORY  |              |
 
+-------------+--------------------------+-----------------+-------------+--------------+
| BUN/Creatin | 12.9                     |                 | [facility]  |              |
| ine Ratio   |                          |                 | [facility]    |              |
|             |                          |                 | MEDICAL     |              |
|             |                          |                 | CENTER -    |              |
|             |                          |                 | LABORATORY  |              |
+-------------+--------------------------+-----------------+-------------+--------------+
 
 
 
+----------+
| Specimen |
+----------+
| Blood    |
+----------+
 
 
 
+----------------------+--------------------+--------------------+----------------+
| Performing           | Address            | City/State/Zipcode | Phone Number   |
| Organization         |                    |                    |                |
+----------------------+--------------------+--------------------+----------------+
|   [facility]     |   [address] |   [place]  |   [phone] |
| [facility]  |                    | [address]              |                |
| - LABORATORY         |                    |                    |                |
+----------------------+--------------------+--------------------+----------------+
 Troponin I (2017  8:33 PM PST)
 
+------------+--------------------------+-------------+-------------+--------------+
| Component  | Value                    | Ref Range   | Performed   | Pathologist  |
|            |                          |             | At          | Signature    |
+------------+--------------------------+-------------+-------------+--------------+
| Troponin I | 2.14 ()Comment:        | <0.06 ng/mL | [facility]  |              |
|            | Critical Result called   |             | [facility]    |              |
|            | to and read back by      |             | MEDICAL     |              |
|            | Sivan Shin RN on  |             | CENTER -    |              |
|            | 2017 at 21:15 by     |             | LABORATORY  |              |
|            | Britney Coburn.          |             |             |              |
|            | Reference                |             |             |              |
|            | Ranges:0.00-0.06 =       |             |             |              |
|            | NORMAL>0.06       =      |             |             |              |
|            | SUSPICIOUS FOR           |             |             |              |
|            | MYOCARDIAL DAMAGE NOTE:  |             |             |              |
|            | Values greater than 0.50 |             |             |              |
|            |  ng/mL have been shown   |             |             |              |
|            | to be strongly           |             |             |              |
|            | associated with acute    |             |             |              |
|            | myocardial infarction.   |             |             |              |
|            | The [employer] College of  |             |             |              |
|            | Cardiology (ACC)         |             |             |              |
|            | recommends a decision    |             |             |              |
|            | limit of 0.06 ng/mL for  |             |             |              |
|            | this assay.   Results    |             |             |              |
|            | greater than 0.06 can    |             |             |              |
|            | reflect a pre-infarct    |             |             |              |
|            | acute coronary syndrome, |             |             |              |
|            |  but can also reflect    |             |             |              |
|            | myocardial necrosis or   |             |             |              |
|            | injury that is not due   |             |             |              |
|            | to coronary artery       |             |             |              |
 
|            | disease.   Some of these |             |             |              |
|            |  causes are sepsis,      |             |             |              |
|            | hypocolemia, atrial      |             |             |              |
|            | fibrillation, heart      |             |             |              |
|            | failure, pulmonary       |             |             |              |
|            | embolism, myocarditis,   |             |             |              |
|            | myocardial contusion,    |             |             |              |
|            | and renal failure.   The |             |             |              |
|            |  diagnosis of myocardial |             |             |              |
|            |  infarction should be    |             |             |              |
|            | based on a combination   |             |             |              |
|            | of the patient's         |             |             |              |
|            | clinical presentation    |             |             |              |
|            | and the clinical         |             |             |              |
|            | laboratory test results  |             |             |              |
|            | (especially serial       |             |             |              |
|            | troponin levels).        |             |             |              |
+------------+--------------------------+-------------+-------------+--------------+
 
 
 
+----------+
| Specimen |
+----------+
| Blood    |
+----------+
 
 
 
+----------------------+--------------------+--------------------+----------------+
| Performing           | Address            | City/State/Zipcode | Phone Number   |
| Organization         |                    |                    |                |
+----------------------+--------------------+--------------------+----------------+
|   [facility]     |   [address] |   [place], [place]  |   [phone] |
| [facility]  |                    | [id number]              |                |
| - LABORATORY         |                    |                    |                |
+----------------------+--------------------+--------------------+----------------+
 CT Angiogram Chest Abdomen (2017  5:05 PM PST)
 
+----------+
| Specimen |
+----------+
|          |
+----------+
 
 
 
+------------------------------------------------------------------------+---------------+
| Narrative                                                              | Performed At  |
+------------------------------------------------------------------------+---------------+
|   CT ANGIOGRAM CHEST ABDOMEN W CONTRAST 2017 5:04 PM     HISTORY:  |   [facility] |
| Rule out dissection.     COMPARISON: None.     PROTOCOL: Axial images  |               |
| of the chest and abdomen were obtained before and after  uneventful    |               |
| administration of 85 mL Omnipaque 350. Coronal and sagittal            |               |
| reformations were acquired.     FINDINGS:  There is moderate           |               |
| atherosclerosis of the aorta extending into the branches. The          |               |
| ascending thoracic aorta is mildly ectatic and measures 3.8 cm. There  |               |
| is no  evidence for dissection. The celiac artery, SMA, and TWYLA are    |               |
| patent. The  bilateral renal arteries are normal. There is mild to     |               |
| moderate atherosclerosis  of the iliac arteries.     Neck base is      |               |
 
| normal. The heart is of normal size. The pulmonary arteries are        |               |
| unremarkable. SVC is normal. No enlarged lymph nodes are seen in the   |               |
| mediastinum, kourtney, or axilla. Trachea and esophagus are normal. There  |               |
| is mild  dependent atelectasis of the bilateral lungs.     The liver   |               |
| demonstrates normal parenchyma. The gallbladder is normal. Biliary     |               |
| ducts are unremarkable. The spleen is unremarkable. The pancreas       |               |
| demonstrates  normal parenchyma and a normal pancreatic duct. Adrenal  |               |
| glands are normal. The  right kidney and visualized ureter are normal. |               |
|  The left kidney and visualized  ureter are normal. Stomach is normal. |               |
|  Imaged small bowel and colon show no acute  findings. There is no     |               |
| significant abnormality in the portal veins, mesenteric  veins, or     |               |
| systemic veins. No enlarged lymph nodes are visualized within the      |               |
| omentum or retroperitoneum. There is no evidence for ascites or free   |               |
| air.     Body wall soft tissue structures are normal. There is mild    |               |
| S-shaped curvature of  the thoracolumbar spine. Mild to moderate       |               |
| spondylosis is present.     IMPRESSION -  No evidence for aortic       |               |
| dissection.     A preliminary report was sent by [facility] with  |               |
| no significant  discrepancy.     Dictated and Signed by: Tony Jacobs |               |
|  MD    Electronically signed: 2017 8:43 AM                         |               |
+------------------------------------------------------------------------+---------------+
 
 
 
+------------------------------------------------------------------------------------------+
| Procedure Note                                                                           |
+------------------------------------------------------------------------------------------+
|   Ace, Rad Results In - 2017  8:46 AM PST  CT ANGIOGRAM CHEST ABDOMEN W CONTRAST   |
| 2017 5:04 PMHISTORY: Rule out dissection.COMPARISON: None.PROTOCOL: Axial images of  |
| the chest and abdomen were obtained before and afteruneventful administration of 85 mL   |
| Omnipaque 350. Coronal and sagittalreformations were acquired.FINDINGS:There is moderate |
|  atherosclerosis of the aorta extending into the branches. Theascending thoracic aorta   |
| is mildly ectatic and measures 3.8 cm. There is noevidence for dissection. The celiac    |
| artery, SMA, and TWYLA are patent. Thebilateral renal arteries are normal. There is mild   |
| to moderate atherosclerosisof the iliac arteries.Neck base is normal. The heart is of    |
| normal size. The pulmonary arteries areunremarkable. SVC is normal. No enlarged lymph    |
| nodes are seen in themediastinum, kourtney, or axilla. Trachea and esophagus are normal.     |
| There is milddependent atelectasis of the bilateral lungs.The liver demonstrates normal  |
| parenchyma. The gallbladder is normal. Biliaryducts are unremarkable. The spleen is      |
| unremarkable. The pancreas demonstratesnormal parenchyma and a normal pancreatic duct.   |
| Adrenal glands are normal. Theright kidney and visualized ureter are normal. The left    |
| kidney and visualizedureter are normal. Stomach is normal. Imaged small bowel and colon  |
| show no acutefindings. There is no significant abnormality in the portal veins,          |
| mesentericveins, or systemic veins. No enlarged lymph nodes are visualized within        |
| theomentum or retroperitoneum. There is no evidence for ascites or free air.Body wall    |
| soft tissue structures are normal. There is mild S-shaped curvature ofthe thoracolumbar  |
| spine. Mild to moderate spondylosis is present.IMPRESSION -No evidence for aortic        |
| dissection.A preliminary report was sent by [facility] with no                      |
| significantdiscrepancy.Dictated and Signed by: Tony Jacobs MD  Electronically signed:   |
| 2017 8:43 AM                                                                         |
|ducts are unremarkable. The spleen is unremarkable. The pancreas demonstrates             |
|normal parenchyma and a normal pancreatic duct. Adrenal glands are normal. The            |
|right kidney and visualized ureter are normal. The left kidney and visualized             |
|ureter are normal. Stomach is normal. Imaged small bowel and colon show no acute          |
|findings. There is no significant abnormality in the portal veins, mesenteric            |
|veins, or systemic veins. No enlarged lymph nodes are visualized within the               |
|omentum or retroperitoneum. There is no evidence for ascites or free air.                 |
|                                                                                          |
|Body wall soft tissue structures are normal. There is mild S-shaped curvature of          |
|the thoracolumbar spine. Mild to moderate spondylosis is present.                         |
|                                                                                          |
 
|IMPRESSION -                                                                              |
|No evidence for aortic dissection.                                                        |
|                                                                                          |
|A preliminary report was sent by [facility] with no significant                      |
|discrepancy.                                                                             |
|                                                                                          |
|Dictated and Signed by: Tony Jacobs MD                                                   |
| Electronically signed: 2017 8:43 AM                                                  |
+------------------------------------------------------------------------------------------+
 
 
 
+---------------+---------+--------------------+--------------+
| Performing    | Address | City/State/Zipcode | Phone Number |
| Organization  |         |                    |              |
+---------------+---------+--------------------+--------------+
|   [facility] |         |                    |              |
+---------------+---------+--------------------+--------------+
 CV CARDIAC PROCEDURE (2017  4:26 PM PST)
 
+----------+
| Specimen |
+----------+
|          |
+----------+
 
 
 
+------------------------------------------------------------------------+--------------+
| Narrative                                                              | Performed At |
+------------------------------------------------------------------------+--------------+
|   This result has an attachment that is not available.  Anastasia CASTILLO         |              |
| MD Suki       2017 19:10Sera Weeks is a 71 y.o. female      |              |
| patient.1. Acquired hypothyroidism   2. NSTEMI (non-ST elevated        |              |
| myocardial infarction) (HCC)   3. Pure hypercholesterolemia    Past    |              |
| Medical History Diagnosis Date                                         |              |
|   Hyperlipidemia                                                       |              |
|   Hypothyroidism                                                       |              |
|   Incontinence                                                         |              |
|   Depression    Blood pressure 124/78, pulse 63, temperature 36.2   C  |              |
| (97.2   F), resp. rate 19, height 1.6 m (5' 3"), weight 74.3 kg (163   |              |
| lb 12.8 oz), SpO2 90 %. CV Cardiac ProcedureDate/Time: 2017        |              |
| 17:10Performed by: ANASTASIA BRASHER AAuthorized by: ANASTASIA BRASHER       |              |
| AConsent: Verbal consent obtained. Written consent obtained.Risks and  |              |
| benefits: risks, benefits and alternatives were discussedConsent given |              |
|  by: patientPatient understanding: patient states understanding of the |              |
|  procedure being performedPatient consent: the patient's understanding |              |
|  of the procedure matches consent givenProcedure consent: procedure    |              |
| consent matches procedure scheduledRelevant documents: relevant        |              |
| documents present and verifiedTest results: test results available and |              |
|  properly labeledSite marked: the operative site was markedImaging     |              |
| studies: imaging studies availableRequired items: required blood       |              |
| products, implants, devices, and special equipment availablePatient    |              |
| identity confirmed: verbally with patient and arm bandTime out:        |              |
| Immediately prior to procedure a "time out" was called to verify the   |              |
| correct patient, procedure, equipment, support staff and site/side     |              |
| marked as required.Preparation: Patient was prepped and draped in the  |              |
| usual sterile fashion.Local anesthesia used: yesLocal anesthetic:      |              |
| lidocaine 1% without epinephrinePatient sedated: yesSedation type:     |              |
| moderate (conscious) sedationSedatives: fentanyl and midazolamVitals:  |              |
 
| Vital signs were monitored during sedation.Patient tolerance: Patient  |              |
| tolerated the procedure well with no immediate complications [place]  |              |
| [facility] LEFT CARDIAC CATHETERIZATION REPORT  PATIENT NAME:      |              |
|   Sera Clifford OF BIRTH:    1945MEDICAL RECORD NUMBER:       |              |
|   [id number] OF PROCEDURE:    2017                            |              |
|                                                                        |              |
|                  PRIMARY CARE PROVIDER:   MAGGIE Anderson   |              |
| :    Anastasia Brasher MD, [facility], [facility] PRE-PROCEDURE DIAGNOSIS:   |              |
|    NSTEMIPOST-PROCEDURE DIAGNOSIS:   same PROCEDURES PERFORMED:        |              |
|   1.   Left Heart Catheterization for pressures2.   Coronary           |              |
| Angiography3.   Aortic Root  DESCRIPTION OF PROCEDURE: Informed        |              |
| consent was obtained from the patient, and a time-out was performed to |              |
|  verify the patient's identification and planned procedure.   Please   |              |
| refer to the computer log entry form for precise details.   The        |              |
| patient's right radial artery was sterilely prepped.   Arterial access |              |
|  was achieved with   micropuncture kit.   Patient was then given       |              |
| intravenous heparin as well as intra-arterial nitroglycerin and        |              |
| Nicardipine through the sheath.   A 5 French JR4 and a 3 DRC did not   |              |
| fit the coronary.   A pullback across the valve was used to assess if  |              |
| there was a gradient across the aortic valve A   JL 3.5 did not fit    |              |
| the left system nor did a LBU 3 did not fit.   A JL3 sat close to the  |              |
| anomalous RCA and also opacified the Left.   A pig tail was used to    |              |
| perform an aortic root. .   The TR band occluder device was utilized   |              |
| to achieve successful hemostasis of the radial artery.   There were no |              |
|  immediate complications.       Estimated blood loss was: 10 cc.       |              |
|   Fluro Time: 8.9 Min    Total Reference Air Kerma: 443.55 mGy         |              |
|   Contrast: 105 mls of Omnipaque 350   FINDINGS:Hemodynamics:Left      |              |
| ventricular end-diastolic pressure (LVEDP) was 14 mm Hg.      There    |              |
| was no gradient across the aortic valve. Left Ventriculogram:   Not    |              |
| performed Left main coronary artery:   Normal Left anterior descending |              |
|  coronary artery:   Large vessel.   No significant disease.Circumflex  |              |
| coronary artery:   Dominant.   Gives off 2 marginal and the PDA.   No  |              |
| significant disease.   10% in the AV groove.Right coronary artery:     |              |
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
| cusp.   Minimal luminal irregularity. Aortic Root:   Enlarged No AI No |              |
|  obvious dissection Possible small aortic ulceration CONCLUSIONS:1.    |              |
| Dilated Aortic Root2. No AI3. Normal Left Main4. Normal LAD5. Dominant |              |
|  LCX.   10% luminal irregularity in AV groove.   Gives off the PDA6.   |              |
| Non dominant RCA.   Mild plaque.   Arises anomalously from the left    |              |
| cusp CLINICAL IMPRESSION AND RECOMMENDATIONS Will need CT of aorta to  |              |
| rule out dissectionEcho for LV function  Anastasia Brasher MD, FACC,      |              |
| FSCAIProvidence Penn Presbyterian Medical CenterDATE/TIME:   2017           |              |
| 17:111 17:11 Portions of this chart were created with Dragon    |              |
| voice recognition software. Occasional wrong-word or                   |              |
|                                                                        |              |
| sound-alike                                                            |              |
|   substitutions may have occurred due to the inherent limitations of   |              |
| voice recognition software. Please read the chart carefully and        |              |
| recognize, using context, where these substitutions have occurred      |              |
| Anastasia Brasher2017                                                |              |
|prepped.   Arterial access was achieved with   micropuncture kit.      |              |
|Patient was then given intravenous heparin as well as                   |              |
|intra-arterial nitroglycerin and Nicardipine through the sheath.        |              |
|A 5 French JR4 and a 3 DRC did not fit the coronary.   A pullback       |              |
|across the valve was used to assess if there was a gradient             |              |
|across the aortic valve A   JL 3.5 did not fit the left system nor      |              |
|did a LBU 3 did not fit.   A JL3 sat close to the anomalous RCA         |              |
|and also opacified the Left.   A pig tail was used to perform an        |              |
|aortic root. .                                                          |              |
|The TR band occluder device was utilized to achieve successful          |              |
 
|hemostasis of the radial artery.   There were no immediate              |              |
|complications.                                                         |              |
|                                                                        |              |
|   Estimated blood loss was: 10 cc.                                     |              |
|   Fluro Time: 8.9 Min                                                  |              |
|   Total Reference Air Kerma: 443.55 mGy                                |              |
|   Contrast: 105 mls of Omnipaque 350                                   |              |
|                                                                        |              |
|FINDINGS:                                                              |              |
|Hemodynamics:                                                          |              |
|Left ventricular end-diastolic pressure (LVEDP) was 14 mm Hg.           |              |
|There was no gradient across the aortic valve.                          |              |
|                                                                        |              |
|Left Ventriculogram:   Not performed                                    |              |
|Left main coronary artery:   Normal                                     |              |
|Left anterior descending coronary artery:   Large vessel.   No          |              |
|significant disease.                                                    |              |
|Circumflex coronary artery:   Dominant.   Gives off 2 marginal and      |              |
|the PDA.   No significant disease.   10% in the AV groove.              |              |
|Right coronary artery:   Small non dominant vessel anomalous            |              |
|origin from the left coronary cusp.   Minimal luminal                   |              |
|irregularity.                                                          |              |
|                                                                        |              |
|Aortic Root:                                                            |              |
|Enlarged                                                                |              |
|No AI                                                                   |              |
|No obvious dissection                                                   |              |
|Possible small aortic ulceration                                        |              |
|CONCLUSIONS:                                                           |              |
|1. Dilated Aortic Root                                                  |              |
|2. No AI                                                                |              |
|3. Normal Left Main                                                     |              |
|4. Normal LAD                                                           |              |
|5. Dominant LCX.   10% luminal irregularity in AV groove.   Gives       |              |
|off the PDA                                                             |              |
|6. Non dominant RCA.   Mild plaque.   Arises anomalously from the       |              |
|left cusp                                                               |              |
|                                                                        |              |
|CLINICAL IMPRESSION AND RECOMMENDATIONS                                 |              |
|Will need CT of aorta to rule out dissection                            |              |
|Echo for LV function                                                    |              |
|                                                                        |              |
|                                                                        |              |
|Anastasia Brasher MD, [facility], FSCAI                                          |              |
|[facility]                                      |              |
|DATE/TIME:   2017 17:11                                             |              |
|2017 17:11                                                          |              |
|                                                                        |              |
|Portions of this chart were created with Dragon voice recognition       |              |
|software. Occasional wrong-word or    sound-alike    substitutions     |              |
|may have occurred due to the inherent limitations of voice              |              |
|recognition software. Please read the chart carefully and               |              |
|recognize, using context, where these substitutions have occurred       |              |
|                                                                        |              |
|                                                                        |              |
|Anastasia Brasher                                                         |              |
|2017                                                                |              |
|                                                                        |              |
 
+------------------------------------------------------------------------+--------------+
 CT Head w wo Contrast (2017  2:23 PM PST)
 
+----------+
| Specimen |
+----------+
|          |
+----------+
 
 
 
+------------------------------------------------------------------------+---------------+
| Narrative                                                              | Performed At  |
+------------------------------------------------------------------------+---------------+
|   UNENHANCED AND ENHANCED HEAD CT 2017 2:23 PM     CLINICAL        |   [facility] |
| HISTORY:   pain behind right eye           COMPARISON: None available  |               |
|     TECHNIQUE: Axial images are performed through the head both before |               |
|  and after the  uneventful intravenous administration of 80 cc         |               |
| Omnipaque 350 contrast.   Coronal  and sagittal reformations are also  |               |
| performed.      FINDINGS:   There is mild cerebral atrophy and mild,   |               |
| patchy hypoattenuation  involving the deep and periventricular         |               |
| cerebral white matter.   Gray-white  differentiation is maintained.    |               |
|   The ventricles, brainstem and cerebellum are  unremarkable.   There  |               |
| is no mass effect, abnormal enhancement, evidence of  intracranial     |               |
| hemorrhage or extra-axial fluid collection/mass.   Bilateral           |               |
| prosthetic ocular lenses are present.   The orbital contents are       |               |
| otherwise  symmetric and unremarkable, without visible mass lesion or  |               |
| proptosis.   There is  mild mucous membrane thickening within          |               |
| bilateral ethmoid air cells.   Imaged  paranasal sinuses are otherwise |               |
|  well aerated, along with the middle ear cavities  and imaged after    |               |
| air cells.   Asymmetric degeneration of the left greater than  right   |               |
| temporomandibular joints is noted.     There is calcified plaque       |               |
| within the cavernous segments of the internal carotid  arteries.       |               |
|   Major intracranial vessels and dural sinuses otherwise appear patent |               |
|   and grossly unremarkable.   No conclusive aneurysm is visible.       |               |
| IMPRESSION -     1.   NO VISIBLE MASS, ANEURYSM OR OTHER POTENTIAL     |               |
| ETIOLOGY FOR RETRO-ORBITAL  PAIN.     2.   MILD CEREBRAL ATROPHY AND   |               |
| PROBABLE CHRONIC, MICROVASCULAR ISCHEMIC CHANGE OF  THE CEREBRAL WHITE |               |
|  MATTER.     3.   MILD ETHMOID SINUS DISEASE.     4.   ASYMMETRIC      |               |
| DEGENERATION OF THE TEMPOROMANDIBULAR JOINTS.     Dictated and Signed  |               |
| by: Андрей Payton MD    Electronically signed: 2017 2:36 PM        |               |
+------------------------------------------------------------------------+---------------+
 
 
 
+------------------------------------------------------------------------------------------+
| Procedure Note                                                                           |
+------------------------------------------------------------------------------------------+
|   Ace, Rad Results In - 2017  2:39 PM PST  UNENHANCED AND ENHANCED HEAD CT         |
| 2017 2:23 PM CLINICAL HISTORY:  pain behind right eye     COMPARISON: None available |
|  TECHNIQUE: Axial images are performed through the head both before and after            |
| theuneventful intravenous administration of 80 cc Omnipaque 350 contrast.  Coronaland    |
| sagittal reformations are also performed.  FINDINGS:  There is mild cerebral atrophy and |
|  mild, patchy hypoattenuationinvolving the deep and periventricular cerebral white       |
| matter.  Gray-whitedifferentiation is maintained.  The ventricles, brainstem and         |
| cerebellum areunremarkable.  There is no mass effect, abnormal enhancement, evidence     |
| ofintracranial hemorrhage or extra-axial fluid collection/mass.  Bilateralprosthetic     |
| ocular lenses are present.  The orbital contents are otherwisesymmetric and              |
| unremarkable, without visible mass lesion or proptosis.  There ismild mucous membrane    |
| thickening within bilateral ethmoid air cells.  Imagedparanasal sinuses are otherwise    |
 
| well aerated, along with the middle ear cavitiesand imaged after air cells.  Asymmetric  |
| degeneration of the left greater thanright temporomandibular joints is noted.There is    |
| calcified plaque within the cavernous segments of the internal carotidarteries.  Major   |
| intracranial vessels and dural sinuses otherwise appear patentand grossly unremarkable.  |
|  No conclusive aneurysm is visible. IMPRESSION -  1.  NO VISIBLE MASS, ANEURYSM OR OTHER |
|  POTENTIAL ETIOLOGY FOR RETRO-ORBITALPAIN.2.  MILD CEREBRAL ATROPHY AND PROBABLE         |
| CHRONIC, MICROVASCULAR ISCHEMIC CHANGE OFTHE CEREBRAL WHITE MATTER.3.  MILD ETHMOID      |
| SINUS DISEASE.4.  ASYMMETRIC DEGENERATION OF THE TEMPOROMANDIBULAR JOINTS.Dictated and   |
| Signed by: Андрей Payton MD  Electronically signed: 2017 2:36 PM                      |
|                                                                                          |
|There is calcified plaque within the cavernous segments of the internal carotid           |
|arteries.  Major intracranial vessels and dural sinuses otherwise appear patent          |
|and grossly unremarkable.  No conclusive aneurysm is visible.                             |
|                                                                                          |
|IMPRESSION -                                                                              |
|1.  NO VISIBLE MASS, ANEURYSM OR OTHER POTENTIAL ETIOLOGY FOR RETRO-ORBITAL               |
|PAIN.                                                                                    |
|                                                                                          |
|2.  MILD CEREBRAL ATROPHY AND PROBABLE CHRONIC, MICROVASCULAR ISCHEMIC CHANGE OF          |
|THE CEREBRAL WHITE MATTER.                                                                |
|                                                                                          |
|3.  MILD ETHMOID SINUS DISEASE.                                                           |
|                                                                                          |
|4.  ASYMMETRIC DEGENERATION OF THE TEMPOROMANDIBULAR JOINTS.                              |
|                                                                                          |
|Dictated and Signed by: Андрей Payton MD                                                   |
| Electronically signed: 2017 2:36 PM                                                  |
+------------------------------------------------------------------------------------------+
 
 
 
+---------------+---------+--------------------+--------------+
| Performing    | Address | City/State/Zipcode | Phone Number |
| Organization  |         |                    |              |
+---------------+---------+--------------------+--------------+
|   [facility] |         |                    |              |
+---------------+---------+--------------------+--------------+
 ECG 12 lead (2017 12:50 PM PST)
 
+-------------+--------------------------+-----------+------------+--------------+
| Component   | Value                    | Ref Range | Performed  | Pathologist  |
|             |                          |           | At         | Signature    |
+-------------+--------------------------+-----------+------------+--------------+
| VENTRICULAR | 57                       | BPM       | [facility]  |              |
|  RATE EKG   |                          |           |            |              |
+-------------+--------------------------+-----------+------------+--------------+
| ATRIAL RATE | 57                       | BPM       | [facility]  |              |
+-------------+--------------------------+-----------+------------+--------------+
| P-R         | 146                      | ms        | [facility]  |              |
| INTERVAL    |                          |           |            |              |
+-------------+--------------------------+-----------+------------+--------------+
| QRS         | 78                       | ms        | [facility]  |              |
| DURATION    |                          |           |            |              |
+-------------+--------------------------+-----------+------------+--------------+
| Q-T         | 450                      | ms        | [facility]  |              |
| INTERVAL    |                          |           |            |              |
+-------------+--------------------------+-----------+------------+--------------+
| Q-T         | 438                      | ms        | [facility]  |              |
| INTERVAL    |                          |           |            |              |
| (CORRECTED) |                          |           |            |              |
 
+-------------+--------------------------+-----------+------------+--------------+
| P WAVE AXIS | 34                       | degrees   | [facility]  |              |
+-------------+--------------------------+-----------+------------+--------------+
| QRS AXIS    | -2                       | degrees   | [facility]  |              |
+-------------+--------------------------+-----------+------------+--------------+
| T AXIS      | 56                       | degrees   | [facility]  |              |
+-------------+--------------------------+-----------+------------+--------------+
| INTERPRETAT | Sinus bradycardiaLow     |           | [facility]  |              |
| ION TEXT    | voltage QRSNonspecific   |           |            |              |
|             | ST abnormalityBorderline |           |            |              |
|             |  ECGNo previous ECGs     |           |            |              |
|             | availableConfirmed by    |           |            |              |
|             | ANASTASIA BRASHER MD        |           |            |              |
|             | ([id number]) on 2017      |           |            |              |
|             | 1:32:01 PM               |           |            |              |
+-------------+--------------------------+-----------+------------+--------------+
 
 
 
+----------+
| Specimen |
+----------+
|          |
+----------+
 
 
 
+----------------------------------------------------------+--------------+
| Narrative                                                | Performed At |
+----------------------------------------------------------+--------------+
|   This result has an attachment that is not available.   |              |
+----------------------------------------------------------+--------------+
 
 
 
+--------------+---------+--------------------+--------------+
| Performing   | Address | City/State/Zipcode | Phone Number |
| Organization |         |                    |              |
+--------------+---------+--------------------+--------------+
|   [facility]  |         |                    |              |
+--------------+---------+--------------------+--------------+
 PTT (2017 12:44 PM PST)
 
+-----------+-------+-----------------+-------------+--------------+
| Component | Value | Ref Range       | Performed   | Pathologist  |
|           |       |                 | At          | Signature    |
+-----------+-------+-----------------+-------------+--------------+
| aPTT      | 32    | 22 - 36 seconds | [facility]  |              |
|           |       |                 | [facility]    |              |
|           |       |                 | [facility]     |              |
|           |       |                 | [facility] -    |              |
|           |       |                 | LABORATORY  |              |
+-----------+-------+-----------------+-------------+--------------+
 
 
 
+----------+
| Specimen |
+----------+
| Blood    |
 
+----------+
 
 
 
+----------------------+--------------------+--------------------+----------------+
| Performing           | Address            | City/State/Zipcode | Phone Number   |
| Organization         |                    |                    |                |
+----------------------+--------------------+--------------------+----------------+
|   [facility]     |   [address] |   [place]  |   [phone] |
| [facility]  |                    | [address]              |                |
| - LABORATORY         |                    |                    |                |
+----------------------+--------------------+--------------------+----------------+
 Basic Metabolic Panel (2017 12:44 PM PST)
 
+-------------+--------------------------+-----------------+-------------+--------------+
| Component   | Value                    | Ref Range       | Performed   | Pathologist  |
|             |                          |                 | At          | Signature    |
+-------------+--------------------------+-----------------+-------------+--------------+
| Na          | 141                      | 136 - 149       | [facility]  |              |
|             |                          | mmol/L          | [facility]    |              |
|             |                          |                 | MEDICAL     |              |
|             |                          |                 | CENTER -    |              |
|             |                          |                 | LABORATORY  |              |
+-------------+--------------------------+-----------------+-------------+--------------+
| K           | 3.7                      | 3.5 - 5.1       | [facility]  |              |
|             |                          | mmol/L          | [facility]    |              |
|             |                          |                 | MEDICAL     |              |
|             |                          |                 | CENTER -    |              |
|             |                          |                 | LABORATORY  |              |
+-------------+--------------------------+-----------------+-------------+--------------+
| Cl          | 105                      | 98 - 109 mmol/L | [facility]  |              |
|             |                          |                 | [facility]    |              |
|             |                          |                 | MEDICAL     |              |
|             |                          |                 | CENTER -    |              |
|             |                          |                 | LABORATORY  |              |
+-------------+--------------------------+-----------------+-------------+--------------+
| CO2         | 30                       | 24 - 31 mmol/L  | [facility]  |              |
|             |                          |                 | [facility]    |              |
|             |                          |                 | [facility]     |              |
|             |                          |                 | CENTER -    |              |
|             |                          |                 | LABORATORY  |              |
+-------------+--------------------------+-----------------+-------------+--------------+
| Anion Gap   | 6                        | 3 - 16 mmol/L   | [facility]  |              |
|             |                          |                 | [facility]    |              |
|             |                          |                 | [facility]     |              |
|             |                          |                 | CENTER -    |              |
|             |                          |                 | LABORATORY  |              |
+-------------+--------------------------+-----------------+-------------+--------------+
| Glucose     | 85                       | 70 - 109 mg/dL  | [facility]  |              |
|             |                          |                 | [facility]    |              |
|             |                          |                 | [facility]     |              |
|             |                          |                 | CENTER -    |              |
|             |                          |                 | LABORATORY  |              |
+-------------+--------------------------+-----------------+-------------+--------------+
| BUN         | 13                       | 7 - 18 mg/dL    | [facility]  |              |
|             |                          |                 | [facility]    |              |
|             |                          |                 | MEDICAL     |              |
|             |                          |                 | CENTER -    |              |
|             |                          |                 | LABORATORY  |              |
+-------------+--------------------------+-----------------+-------------+--------------+
 
| Creatinine  | 0.54 (L)                 | 0.60 - 1.30     | [facility]  |              |
|             |                          | mg/dL           |  [facility]    |              |
|             |                          |                 | MED[facility]     |              |
|             |                          |                 | CENTER -    |              |
|             |                          |                 | LABORATORY  |              |
+-------------+--------------------------+-----------------+-------------+--------------+
| eGFR if not | >60Comment: GLOMERULAR   | >=60            | [facility]  |              |
|      | FILTRATION               | mL/min/1.73m2   | [facility]    |              |
| [employer]    | RATE,ESTIMATED           |                 | MEDICAL     |              |
|             |   mL/min/1.13l8Kshf than |                 | CENTER -    |              |
|             |  60    Chronic kidney    |                 | LABORATORY  |              |
|             | disease,if found over a  |                 |             |              |
|             | 3-month period.Less than |                 |             |              |
|             |  15    Kidney failureFor |                 |             |              |
|             |                   |                 |             |              |
|             | Americans,multiply the   |                 |             |              |
|             | calculated GFR by 1.21.  |                 |             |              |
|             |                          |                 |             |              |
+-------------+--------------------------+-----------------+-------------+--------------+
| Calcium     | 9.1                      | 8.3 - 10.5      | [facility]  |              |
|             |                          | mg/dL           |  [facility]    |              |
|             |                          |                 | MEDICAL     |              |
|             |                          |                 | CENTER -    |              |
|             |                          |                 | LABORATORY  |              |
+-------------+--------------------------+-----------------+-------------+--------------+
| BUN/Creatin | 24.1                     |                 | [facility]  |              |
| ine Ratio   |                          |                 | [facility]    |              |
|             |                          |                 | MEDICAL     |              |
|             |                          |                 | CENTER -    |              |
|             |                          |                 | LABORATORY  |              |
+-------------+--------------------------+-----------------+-------------+--------------+
 
 
 
+----------+
| Specimen |
+----------+
| Blood    |
+----------+
 
 
 
+----------------------+--------------------+--------------------+----------------+
| Performing           | Address            | City/State/Zipcode | Phone Number   |
| Organization         |                    |                    |                |
+----------------------+--------------------+--------------------+----------------+
|   [facility]     |   [address] |   [place]  |   [phone] |
| [facility]  |                    | [address]              |                |
| - LABORATORY         |                    |                    |                |
+----------------------+--------------------+--------------------+----------------+
 CBC no Differential (2017 12:44 PM PST)
 
+-------------+---------+-----------------+-------------+--------------+
| Component   | Value   | Ref Range       | Performed   | Pathologist  |
|             |         |                 | At          | Signature    |
+-------------+---------+-----------------+-------------+--------------+
| White Blood | 4.2     | 4.0 - 11.0 K/uL | [facility]  |              |
|  Cells      |         |                 | [facility]    |              |
|             |         |                 | MEDICAL     |              |
|             |         |                 | CENTER -    |              |
 
|             |         |                 | LABORATORY  |              |
+-------------+---------+-----------------+-------------+--------------+
| Red Blood   | 4.33    | 3.70 - 5.20     | [facility]  |              |
| Cells       |         | M/uL            | [facility]    |              |
|             |         |                 | MEDICAL     |              |
|             |         |                 | CENTER -    |              |
|             |         |                 | LABORATORY  |              |
+-------------+---------+-----------------+-------------+--------------+
| Hemoglobin  | 13.9    | 11.5 - 16.0     | [facility]  |              |
|             |         | g/dL            | [facility]    |              |
|             |         |                 | MEDICAL     |              |
|             |         |                 | CENTER -    |              |
|             |         |                 | LABORATORY  |              |
+-------------+---------+-----------------+-------------+--------------+
| Hematocrit  | 41.2    | 34.0 - 47.0 %   | [facility]  |              |
|             |         |                 | [facility]    |              |
|             |         |                 | MEDICAL     |              |
|             |         |                 | CENTER -    |              |
|             |         |                 | LABORATORY  |              |
+-------------+---------+-----------------+-------------+--------------+
| MCV         | 95.0    | 83.0 - 101.0 fL | [facility]  |              |
|             |         |                 | [facility]    |              |
|             |         |                 | MEDICAL     |              |
|             |         |                 | CENTER -    |              |
|             |         |                 | LABORATORY  |              |
+-------------+---------+-----------------+-------------+--------------+
| MCH         | 32.2    | 28.0 - 35.0 pg  | [facility]  |              |
|             |         |                 | [facility]    |              |
|             |         |                 | MEDICAL     |              |
|             |         |                 | CENTER -    |              |
|             |         |                 | LABORATORY  |              |
+-------------+---------+-----------------+-------------+--------------+
| MCHC        | 33.9    | 32.0 - 36.0     | [facility]  |              |
|             |         | g/dL            | [facility]    |              |
|             |         |                 | MEDICAL     |              |
|             |         |                 | CENTER -    |              |
|             |         |                 | LABORATORY  |              |
+-------------+---------+-----------------+-------------+--------------+
| RDW-CV      | 12.8    | <15.0 %         | [facility]  |              |
|             |         |                 | [facility]    |              |
|             |         |                 | MEDICAL     |              |
|             |         |                 | CENTER -    |              |
|             |         |                 | LABORATORY  |              |
+-------------+---------+-----------------+-------------+--------------+
| Platelet    | 129 (L) | 140 - 440 K/uL  | [facility]  |              |
| Count       |         |                 | [facility]    |              |
|             |         |                 | MEDICAL     |              |
|             |         |                 | CENTER -    |              |
|             |         |                 | LABORATORY  |              |
+-------------+---------+-----------------+-------------+--------------+
| MPV         | 10.1    | fL              | [facility]  |              |
|             |         |                 | [facility]    |              |
|             |         |                 | MEDICAL     |              |
|             |         |                 | CENTER -    |              |
|             |         |                 | LABORATORY  |              |
+-------------+---------+-----------------+-------------+--------------+
 
 
 
+----------+
 
| Specimen |
+----------+
| Blood    |
+----------+
 
 
 
+----------------------+--------------------+--------------------+----------------+
| Performing           | Address            | City/State/Zipcode | Phone Number   |
| Organization         |                    |                    |                |
+----------------------+--------------------+--------------------+----------------+
|   [facility]     |   [address] |   [place]  |   [phone] |
| [facility]  |                    | [address]              |                |
| - LABORATORY         |                    |                    |                |
+----------------------+--------------------+--------------------+----------------+
 Troponin I (2017 12:44 PM PST)
 
+------------+--------------------------+-------------+-------------+--------------+
| Component  | Value                    | Ref Range   | Performed   | Pathologist  |
|            |                          |             | At          | Signature    |
+------------+--------------------------+-------------+-------------+--------------+
| Troponin I | 2.01 ()Comment:        | <0.06 ng/mL | [facility]  |              |
|            | Critical Result called   |             | [facility]    |              |
|            | to and read back by      |             | MEDICAL     |              |
|            | [facility]REST[facility] on   |             | CENTER -    |              |
|            | 2017 at 13:16 by     |             | LABORATORY  |              |
|            | Joe Tarango.          |             |             |              |
|            | Reference                |             |             |              |
|            | Ranges:0.00-0.06 =       |             |             |              |
|            | NORMAL>0.06       =      |             |             |              |
|            | SUSPICIOUS FOR           |             |             |              |
|            | MYOCARDIAL DAMAGE NOTE:  |             |             |              |
|            | Values greater than 0.50 |             |             |              |
|            |  ng/mL have been shown   |             |             |              |
|            | to be strongly           |             |             |              |
|            | associated with acute    |             |             |              |
|            | myocardial infarction.   |             |             |              |
|            | The [employer] College of  |             |             |              |
|            | Cardiology (ACC)         |             |             |              |
|            | recommends a decision    |             |             |              |
|            | limit of 0.06 ng/mL for  |             |             |              |
|            | this assay.   Results    |             |             |              |
|            | greater than 0.06 can    |             |             |              |
|            | reflect a pre-infarct    |             |             |              |
|            | acute coronary syndrome, |             |             |              |
|            |  but can also reflect    |             |             |              |
|            | myocardial necrosis or   |             |             |              |
|            | injury that is not due   |             |             |              |
|            | to coronary artery       |             |             |              |
|            | disease.   Some of these |             |             |              |
|            |  causes are sepsis,      |             |             |              |
|            | hypocolemia, atrial      |             |             |              |
|            | fibrillation, heart      |             |             |              |
|            | failure, pulmonary       |             |             |              |
|            | embolism, myocarditis,   |             |             |              |
|            | myocardial contusion,    |             |             |              |
|            | and renal failure.   The |             |             |              |
|            |  diagnosis of myocardial |             |             |              |
|            |  infarction should be    |             |             |              |
|            | based on a combination   |             |             |              |
 
|            | of the patient's         |             |             |              |
|            | clinical presentation    |             |             |              |
|            | and the clinical         |             |             |              |
|            | laboratory test results  |             |             |              |
|            | (especially serial       |             |             |              |
|            | troponin levels).        |             |             |              |
+------------+--------------------------+-------------+-------------+--------------+
 
 
 
+----------+
| Specimen |
+----------+
| Blood    |
+----------+
 
 
 
+----------------------+--------------------+--------------------+----------------+
| Performing           | Address            | City/State/Zipcode | Phone Number   |
| Organization         |                    |                    |                |
+----------------------+--------------------+--------------------+----------------+
|   [facility]     |   [address] |   [place]  |   [phone] |
| [facility]  |                    | [address]              |                |
| - LABORATORY         |                    |                    |                |
+----------------------+--------------------+--------------------+----------------+
 XR Chest PA or AP (2017  4:55 AM PST)
 
+----------+
| Specimen |
+----------+
|          |
+----------+
 
 
 
+--------------------------------------------------------------------+---------------+
| Narrative                                                          | Performed At  |
+--------------------------------------------------------------------+---------------+
|   External films for comparison only - no result from [facility].  |   [facility] |
+--------------------------------------------------------------------+---------------+
 
 
 
+---------------+---------+--------------------+--------------+
| Performing    | Address | City/State/Zipcode | Phone Number |
| Organization  |         |                    |              |
+---------------+---------+--------------------+--------------+
|   [facility] |         |                    |              |
+---------------+---------+--------------------+--------------+
 ECG - EXTERNAL SCAN (2017 12:00 AM PST)
 
+------------------------------------------------------------------------+--------------+
| Narrative                                                              | Performed At |
+------------------------------------------------------------------------+--------------+
|   This result has an attachment that is not available.  Ordered by an  |              |
| unspecified provider.                                                  |              |
+------------------------------------------------------------------------+--------------+
 documented in this encounter
 
 
 Visit Diagnoses
 Not on filedocumented in this encounter
 
 Administered Medications
 
 
+-----------------------------------+--------+----------+--------+------+------+
| Medication Order                  | MAR    | Action   | Dose   | Rate | Site |
|                                   | Action | Date     |        |      |      |
+-----------------------------------+--------+----------+--------+------+------+
|   fentaNYL (PF) injection  ONCE   | Given  | 20 | 25 mcg |      |      |
| PRN, Starting Sun 17 at 1536, |        | 17  3:41 |        |      |      |
|  Intra-op                         |        |  PM PST  |        |      |      |
+-----------------------------------+--------+----------+--------+------+------+
 
 
 
+-------+----------+--------+---+---+
| Given | 20 | 50 mcg |   |   |
|       | 17  3:36 |        |   |   |
|       |  PM PST  |        |   |   |
+-------+----------+--------+---+---+
 
 
 
+---+---+
|   |   |
+---+---+
 
 
 
+--------------------------------+-------+----------+---------+---+---+
|   iohexol (OMNIPAQUE 350) 350  | Given | 20 | 105 mLs |   |   |
| mg/mL injection  ONCE PRN,     |       | 17  4:11 |         |   |   |
| Starting Sun 17 at 1611,   |       |  PM PST  |         |   |   |
| Intra-op                       |       |          |         |   |   |
+--------------------------------+-------+----------+---------+---+---+
 
 
 
+---+---+
|   |   |
+---+---+
 
 
 
+-----------------------------------+-------+----------+-------+---+----------+
|   lidocaine buffered 1% injection | Given | 20 | 4 mLs |   | Surgical |
|   ONCE PRN, Starting Sun 17   |       | 17  3:39 |       |   |  Site    |
| at 1539, Intra-op                 |       |  PM PST  |       |   |          |
+-----------------------------------+-------+----------+-------+---+----------+
 
 
 
+---+---+
|   |   |
+---+---+
 
 
 
 
+-----------------------------------+-------+----------+--------+---+---+
|   midazolam (VERSED) 1 mg/mL      | Given | 20 | 0.5 mg |   |   |
| injection  ONCE PRN, Starting Sun |       | 17  3:41 |        |   |   |
|  17 at 1536, Intra-op         |       |  PM PST  |        |   |   |
+-----------------------------------+-------+----------+--------+---+---+
 
 
 
+-------+----------+--------+---+---+
| Given | 20 | 0.5 mg |   |   |
|       | 17  3:38 |        |   |   |
|       |  PM PST  |        |   |   |
+-------+----------+--------+---+---+
| Given | 20 | 0.5 mg |   |   |
|       | 17  3:36 |        |   |   |
|       |  PM PST  |        |   |   |
+-------+----------+--------+---+---+
 
 
 
+---+---+
|   |   |
+---+---+
 
 
 
+-----------------------------------+-------+----------+---------+---+---+
|   niCARdipine in dextrose         | Given | 20 | 200 mcg |   |   |
| (CARDENE) 0.2 mg/ml syringe  ONCE |       | 17  3:56 |         |   |   |
|  PRN, Starting Sun 17 at      |       |  PM PST  |         |   |   |
| 1545, Intra-op                    |       |          |         |   |   |
+-----------------------------------+-------+----------+---------+---+---+
 
 
 
+-------+----------+---------+---+---+
| Given | 20 | 200 mcg |   |   |
|       | 17  3:48 |         |   |   |
|       |  PM PST  |         |   |   |
+-------+----------+---------+---+---+
| Given | 20 | 200 mcg |   |   |
|       | 17  3:45 |         |   |   |
|       |  PM PST  |         |   |   |
+-------+----------+---------+---+---+
 
 
 
+---+---+
|   |   |
+---+---+
 documented in this encounter Subjective:      Caleb Morales is a 47 y.o. female is now 2 weeks status post laparoscopic gastric bypass surgery. Doing well overall. Currently on a stage 3 diet without difficulty. Taking in 64oz water,  50g protein. 30 min of activity daily. Bowel movements are regular. The patient is not having any pain. .  The patient is compliant with multivitamins, calcium and B12 supplements. Weight Loss Metrics 8/9/2017 8/9/2017 8/3/2017 7/25/2017 6/29/2017 6/29/2017 5/5/2017   Pre op / Initial Wt 330 - - - 332 - -   Today's Wt - 305 lb 308 lb 330 lb - 332 lb 333 lb   BMI - 45.04 kg/m2 45.48 kg/m2 48.73 kg/m2 - 49.03 kg/m2 49.18 kg/m2   Ideal Body Wt 146 - - - 146 - -   Excess Body Wt 184 - - - 186 - -   Goal Wt 183 - - - 183 - -   Wt loss to date 25 - - - 0 - -   % Wt Loss 0.17 - - - 0 - -   80% .2 - - - 148.8 - -       Body mass index is 45.04 kg/(m^2).     Comorbidities:    Hypertension: improved  Diabetes: not applicable  Obstructive Sleep Apnea: improved  Hyperlipidemia: improved  Stress Urinary Incontinence: not applicable  Gastroesophageal Reflux: not applicable  Weight related arthropathy:not applicable        Past Medical History:   Diagnosis Date    ADD (attention deficit disorder) 17 y/o     d/hanny meds 28 y/o    Cyst of left breast 3/15     6 month US for ff-up 9/15    Endometriosis     HTN (hypertension) 3/4/2010    Irregular menses     Kidney stone 2007    Migraine 3/4/2010    prn excedrine migraine OTC, trigger heat/dehydration    Morbid obesity ([facility] [address]     BRIAN on CPAP     NOT USING CPAP    Pelvic cramping     Prediabetes        Past Surgical History:   Procedure Laterality Date    HX DILATION AND CURETTAGE      HX GI      GBP    HX LAP GASTRIC BYPASS  07/26/2017    HX PELVIC LAPAROSCOPY  7/2002    endometriosis, lysis of adhesions d and c       Current Outpatient Prescriptions   Medication Sig Dispense Refill    cyanocobalamin (VITAMIN B-12) 1,000 mcg tablet Take 1,000 mcg by mouth daily.  calcium citrate-vitamin D3 (CITRACAL WITH VITAMIN D MAXIMUM) tablet Take 2 Tabs by mouth three (3) times daily.  labetalol (NORMODYNE) 200 mg tablet TAKE 1 TABLET BY MOUTH TWICE A  Tab 2    cholecalciferol, VITAMIN D3, (VITAMIN D3) 5,000 unit tab tablet Take 5,000 Units by mouth daily.  loratadine (CLARITIN) 10 mg tablet Take 1 Tab by mouth daily. 30 Tab 11    multivitamin with iron tablet Take 1 Tab by mouth two (2) times a day. No Known Allergies      Objective:     Visit Vitals    /80    Pulse 92    Temp 98.5 °F (36.9 °C)    Resp 18    Ht 5' 9\" (1.753 m)    Wt 138.3 kg (305 lb)    BMI 45.04 kg/m2       General:  alert, cooperative, no distress, appears stated age   Chest: no accessory muscle use   Cor:   Regular rate and rhythm   Abdomen: soft, bowel sounds active, non-tender   Incision:   no hernia, incision well approximated       Labs: none    Assessment:     Doing well postoperatively.   Cut down on refried beans    Plan:     Increase activity to the goal of 30 minutes daily, Follow up labs as ordered, Increase fluids, Follow up with Registered Dietician and Continue MVI/Ca/B12 supplementation  Follow up in 3 weeks

## 2020-07-24 NOTE — NUR
PT GIVEN PO PROTONIX AND LOVENOX SHOT, GRISELDA WELL. PT IS ALERT AND ORIENTED X4.
SHE DENIES PAIN, NAUSEA, AND SOB AT THIS TIME. PHELBOTOMY IN ROOM TO DRAW LAB
SAMPLE OF BLOOD. PT ABLE TO EAT 75% OF HER BREAKFAST. DENIES ANY NEEDS AT THIS
TIME.

## 2020-07-24 NOTE — NUR
PT GIVEN DC INSTRUCTIONS, ALL QUESTIONS ANSWERED, PERSCRIPTIONS GIVEN TO PT.
BOTH SALINE LOCKS REMOVED BY HEBER WELCH, TIPS OF CATHS INTACT, NO REDNESS OR
SWELLING NOTED, PT DENIES PAIN AT EITHER SITE. PT DRESSED IN HER OWN CLOTHES,
TRANSPORTED OUT VIA WHEELCHAIR BY LUBA BRANDT.

## 2020-07-24 NOTE — NUR
PATIENT HAD SOME NAUSEA. NO EMESIS. SAT AT EDGE OF BED FOR SEVERAL MINS. PRN
ZOFRAN PROVIDED. PATIENT OFFERED FOOD TO SETTLE STOMACH AFTER TAKING PAIN MEDS
BUT DENIED THIS. PATIENT BACK RESTING IN BED. CALL LIGHT IN REACH.

## 2020-07-24 NOTE — NUR
PT SLEEPING SOUNDLY AT THIS TIME, NO VISABLE SIGNS OF DISTRESS, RESP EVEN AND
UNLABORED. CALL LIGHT IS WITHIN REACH. IV FLUIDS INFUSING EASIY.

## 2020-07-24 NOTE — NUR
PATIENT RESTING WITH EYES CLOSED.  AWAKENS BY NAME.  PATIENT LIVES ALONE, IS
EMPLOYED PART-TIME AND DRIVES.  HAS FAMILY TO CALL FOR HELP.  SHE DOES NOT
FEEL SHE HAS FINANCIAL WORRIES TO AFFORD MEDS, FOOD OR UTILITIES.  SHE IS
WORRIED ABOUT COST OF NEW MEDICATION, EXPLAINED THAT PHARMACY IS CHECKING ON
COST FOR HER AND THEY WILL WORK WITH DOCTOR ON WHAT WILL WORK THAT SHE CAN
AFFORD.  SHE IS RELIEVED ABOUT THIS.  SHE DENIES ANY AMBULATION ISSUES.  SHE
DOES HAVE A CANE AND WALKER AT HOME FROM PAST KNEE SURGERY.  SHE FEELS SAFE TO
RETURN HOME AT DISCHARGE.  SHE HAS PCP TO FOLLOW UP WITH.

## 2020-07-24 NOTE — NUR
PT AWAKE AND ALERT X4, REQUESTS TO GO TO THE BATHROOM. PT ABLE TO AMBULATE
INDEPENDNETLY ONCE MONITOR LEADS ARE DISCONNECTED. PT GIVEN PERSONAL HYGIENE
SUPPLIES IN BATHROOM. BREAKFAST HAS BEEN DELIVERED. PT DENIES NEED FOR PAIN
MEDICATION AT THIS TIME.

## 2020-07-24 NOTE — NUR
PATIENT PROVIDED WITH PRN OXY PER ORDERS FOR 5/10 CHEST PAIN. PATIENT DENIES
ANY OTHER NEEDS AT THIS TIME. CALL LIGHT IN REACH.

## 2020-07-24 NOTE — NUR
PATIENT UP TO THE BATHROOM. STEADY ON HER FEET. REPORTS FEELING IMPROVEMENT IN
PAIN, 3/10 AND NO NAUSEA. PATIENT RETURNED TO BED. DENIED ANY FURTHER NEEDS.

## 2020-12-26 NOTE — XMS
PreManage Notification: EVARISTO ORTA MRN:[id number]
 
Security Information
 
Security Events
No recent Security Events currently on file
 
 
 
CRITERIA MET
------------
- PDMP
 
 
CARE PROVIDERS
-------------------------------------------------------------------------------------
GILA HAWKINS      Physician Assistant     07/27/2020-Current
 
PHONE: Unknown
-------------------------------------------------------------------------------------
 
Juan C has no Care Guidelines for this patient.
 
ETRISTIN VISIT COUNT (12 MO.)
-------------------------------------------------------------------------------------
2 [facility]
-------------------------------------------------------------------------------------
TOTAL 2
-------------------------------------------------------------------------------------
NOTE: Visits indicate total known visits.
 
ED/UCC VISIT TRACKING (12 MO.)
-------------------------------------------------------------------------------------
12/26/2020 10:48
[facility] [place]
 
TYPE: Emergency
 
COMPLAINT:
- FALL
-------------------------------------------------------------------------------------
07/23/2020 20:26
[facility] [place]
 
TYPE: Emergency
 
COMPLAINT:
- CHEST PAIN
-------------------------------------------------------------------------------------
 
 
INPATIENT VISIT TRACKING (12 MO.)
-------------------------------------------------------------------------------------
07/23/2020 20:27
[facility] [place]
 
TYPE: Observation
 
COMPLAINT:
 
- PE
 
DIAGNOSES:
- Hyperlipidemia, unspecified
- Precordial pain
- Other long term (current) drug therapy
- Allergy status to narcotic agent
- Atherosclerotic heart disease of native coronary artery without angina pectoris
- Hypothyroidism, unspecified
- Contact with and (suspected) exposure to other viral communicable diseases
- Personal history of nicotine dependence
- Other pulmonary embolism without acute cor pulmonale
-------------------------------------------------------------------------------------
 
[email or web address]

## 2021-01-05 NOTE — XMS
PreManage Notification: EVARISTO ORTA MRN:[id number]
 
Security Information
 
Security Events
No recent Security Events currently on file
 
 
 
CRITERIA MET
------------
- [facility] - 2 Visits in 30 Days
 
 
CARE PROVIDERS
-------------------------------------------------------------------------------------
GILA HAWKINS      Physician Assistant     07/27/2020-Current
 
PHONE: Unknown
-------------------------------------------------------------------------------------
 
Juan C has no Care Guidelines for this patient.
 
BREANA VISIT COUNT (12 MO.)
-------------------------------------------------------------------------------------
3 [facility]
-------------------------------------------------------------------------------------
TOTAL 3
-------------------------------------------------------------------------------------
NOTE: Visits indicate total known visits.
 
ED/UCC VISIT TRACKING (12 MO.)
-------------------------------------------------------------------------------------
01/05/2021 07:48
[facility] [place]
 
TYPE: Emergency
 
COMPLAINT:
- BACK PAIN
-------------------------------------------------------------------------------------
12/26/2020 10:48
[facility] [place]
 
TYPE: Emergency
 
COMPLAINT:
- FALL
 
DIAGNOSES:
- Unspecified injury of lower back, initial encounter
- Major depressive disorder, single episode, unspecified
- Pure hypercholesterolemia, unspecified
- Long term (current) use of anticoagulants
- Other long term (current) drug therapy
- Hypothyroidism, unspecified
- Low back pain
- Old myocardial infarction
 
- Other fall on same level, initial encounter
- Allergy status to narcotic agent
-------------------------------------------------------------------------------------
07/23/2020 20:26
[facility] [place]
 
TYPE: Emergency
 
COMPLAINT:
- CHEST PAIN
-------------------------------------------------------------------------------------
 
 
INPATIENT VISIT TRACKING (12 MO.)
-------------------------------------------------------------------------------------
07/23/2020 20:27
[facility] [place]
 
TYPE: Observation
 
COMPLAINT:
- PE
 
DIAGNOSES:
- Hyperlipidemia, unspecified
- Precordial pain
- Other long term (current) drug therapy
- Allergy status to narcotic agent
- Atherosclerotic heart disease of native coronary artery without angina pectoris
- Hypothyroidism, unspecified
- Contact with and (suspected) exposure to other viral communicable diseases
- Personal history of nicotine dependence
- Other pulmonary embolism without acute cor pulmonale
-------------------------------------------------------------------------------------
 
[email or web address]

## 2021-06-02 NOTE — XMS
PreManage Notification: EVARISTO ORTA MRN:[id number]
 
Security Information
 
Security Events
No recent Security Events currently on file
 
 
 
CRITERIA MET
------------
- [facility]
 
 
CARE PROVIDERS
-------------------------------------------------------------------------------------
GILA HAWKINS      Physician Assistant     07/27/2020-Current
 
PHONE: Unknown
-------------------------------------------------------------------------------------
 
Juan C has no Care Guidelines for this patient.
Care History
Medical/Surgical
01/06/2021    [facility]
 
      - PATIENT WAS SEEN ON 01/05/2021 BY PCP DR HAWKINS- DURING THE VISIT PATIENT 
      WAS PRESCRIBED PAIN MEDICATIONS AND STEROIDS.
      - PCP REQUESTED AN MRI REFERRAL MADE- NEUROSURGERY REFERRAL REQUESTED- 
      CURRENTLY WAITING ON INSURANCE TO AUTHORIZE.
      - PATIENT HAS AN APT FOR FOLLOW UP WITH DR HAWKINS ON 01/19/2021.
E.D. VISIT COUNT (12 MO.)
-------------------------------------------------------------------------------------
[address] [place] [address]
-------------------------------------------------------------------------------------
TOTAL 4
-------------------------------------------------------------------------------------
NOTE: Visits indicate total known visits.
 
ED/UCC VISIT TRACKING (12 MO.)
-------------------------------------------------------------------------------------
06/02/2021 23:43
[facility] [place]
 
TYPE: Emergency
 
COMPLAINT:
- LOWER BACK PAIN
-------------------------------------------------------------------------------------
01/05/2021 07:48
[facility] [place]
 
TYPE: Emergency
 
COMPLAINT:
- BACK PAIN
 
DIAGNOSES:
- Unspecified fracture of first lumbar vertebra, initial encounter for closed
 
fracture
- Unspecified fall, initial encounter
- Old myocardial infarction
- Personal history of nicotine dependence
- Low back pain
- Unspecified fracture of fifth lumbar vertebra, initial encounter for closed
fracture
- Pure hypercholesterolemia, unspecified
- Long term (current) use of anticoagulants
- Other long term (current) drug therapy
- Allergy status to narcotic agent
- Hypothyroidism, unspecified
-------------------------------------------------------------------------------------
12/26/2020 10:48
[facility] [place]
 
TYPE: Emergency
 
COMPLAINT:
- FALL
 
DIAGNOSES:
- Unspecified injury of lower back, initial encounter
- Major depressive disorder, single episode, unspecified
- Pure hypercholesterolemia, unspecified
- Allergy status to narcotic agent
- Long term (current) use of anticoagulants
- Other long term (current) drug therapy
- Hypothyroidism, unspecified
- Low back pain
- Old myocardial infarction
- Other fall on same level, initial encounter
- Allergy status to narcotic agent
-------------------------------------------------------------------------------------
07/23/2020 20:26
[facility] [place]
 
TYPE: Emergency
 
COMPLAINT:
- CHEST PAIN
-------------------------------------------------------------------------------------
 
 
INPATIENT VISIT TRACKING (12 MO.)
-------------------------------------------------------------------------------------
07/23/2020 20:27
[facility] [place]
 
TYPE: Observation
 
COMPLAINT:
- PE
 
DIAGNOSES:
- Hyperlipidemia, unspecified
- Precordial pain
- Other long term (current) drug therapy
- Allergy status to narcotic agent
- Atherosclerotic heart disease of native coronary artery without angina pectoris
 
- Hypothyroidism, unspecified
- Contact with and (suspected) exposure to other viral communicable diseases
- Personal history of nicotine dependence
- Other pulmonary embolism without acute cor pulmonale
-------------------------------------------------------------------------------------
 
[email or web address]